# Patient Record
Sex: FEMALE | Race: WHITE | NOT HISPANIC OR LATINO | Employment: UNEMPLOYED | URBAN - METROPOLITAN AREA
[De-identification: names, ages, dates, MRNs, and addresses within clinical notes are randomized per-mention and may not be internally consistent; named-entity substitution may affect disease eponyms.]

---

## 2017-01-13 ENCOUNTER — TELEPHONE (OUTPATIENT)
Dept: SLEEP MEDICINE | Facility: CLINIC | Age: 52
End: 2017-01-13

## 2017-01-13 NOTE — TELEPHONE ENCOUNTER
FRANKY for mani to call back and reschedule her 2/8/17 appointment as Dr. Ferrer will not be in the office that afternoon

## 2017-01-17 DIAGNOSIS — Z94.0 KIDNEY TRANSPLANTED: Primary | ICD-10-CM

## 2017-01-17 RX ORDER — AZATHIOPRINE 50 MG/1
TABLET ORAL
Qty: 90 TABLET | Refills: 0 | Status: CANCELLED | OUTPATIENT
Start: 2017-01-17

## 2017-01-17 NOTE — TELEPHONE ENCOUNTER
Drug Name: Azathioprine 50mg tabs  Last Fill Date: 12/13/2016  Quantity: 90    Brigida Hernandez Cpht  [facility]  [phone]

## 2017-01-18 DIAGNOSIS — I10 HYPERTENSION: Primary | ICD-10-CM

## 2017-01-18 DIAGNOSIS — Z94.0 KIDNEY TRANSPLANTED: Primary | ICD-10-CM

## 2017-01-18 RX ORDER — LOSARTAN POTASSIUM 100 MG/1
50 TABLET ORAL DAILY
Qty: 90 TABLET | Refills: 0 | Status: SHIPPED | OUTPATIENT
Start: 2017-01-18 | End: 2017-08-23

## 2017-01-18 NOTE — TELEPHONE ENCOUNTER
Last Office Visit with Nephrologist:  9/29/15. Follow up scheduled 6/19/17.  Medication refilled per Nephrology Clinic protocol.    Catrina Ruelas RN

## 2017-01-19 RX ORDER — AZATHIOPRINE 50 MG/1
50 TABLET ORAL DAILY
Qty: 90 TABLET | Refills: 0 | Status: SHIPPED | OUTPATIENT
Start: 2017-01-19 | End: 2017-02-24

## 2017-02-22 ENCOUNTER — TELEPHONE (OUTPATIENT)
Dept: TRANSPLANT | Facility: CLINIC | Age: 52
End: 2017-02-22

## 2017-02-24 DIAGNOSIS — Z94.83 PANCREAS REPLACED BY TRANSPLANT (H): ICD-10-CM

## 2017-02-24 DIAGNOSIS — Z94.0 KIDNEY TRANSPLANTED: Primary | ICD-10-CM

## 2017-02-24 RX ORDER — AZATHIOPRINE 50 MG/1
150 TABLET ORAL DAILY
Qty: 270 TABLET | Refills: 0 | Status: SHIPPED | OUTPATIENT
Start: 2017-02-24 | End: 2017-05-31

## 2017-02-24 NOTE — TELEPHONE ENCOUNTER
Left message back for patient to call and let transplant office know what the mistake was so we can correct it.

## 2017-05-31 DIAGNOSIS — Z94.83 PANCREAS REPLACED BY TRANSPLANT (H): ICD-10-CM

## 2017-05-31 DIAGNOSIS — Z94.0 KIDNEY TRANSPLANTED: ICD-10-CM

## 2017-06-01 RX ORDER — AZATHIOPRINE 50 MG/1
150 TABLET ORAL DAILY
Qty: 90 TABLET | Refills: 0 | Status: SHIPPED | OUTPATIENT
Start: 2017-06-01 | End: 2017-07-06

## 2017-06-03 ENCOUNTER — HEALTH MAINTENANCE LETTER (OUTPATIENT)
Age: 52
End: 2017-06-03

## 2017-06-06 DIAGNOSIS — Z94.0 KIDNEY REPLACED BY TRANSPLANT: ICD-10-CM

## 2017-06-06 NOTE — TELEPHONE ENCOUNTER
Patient has not been seen since Sept 2015, but has a follow up appointment in 2 weeks. Temporary supply sent to patient's pharmacy until she's seen for follow up.    Hunter Sepulveda RN

## 2017-06-15 ENCOUNTER — TELEPHONE (OUTPATIENT)
Dept: NEPHROLOGY | Facility: CLINIC | Age: 52
End: 2017-06-15

## 2017-06-19 ENCOUNTER — OFFICE VISIT (OUTPATIENT)
Dept: NEPHROLOGY | Facility: CLINIC | Age: 52
End: 2017-06-19
Attending: INTERNAL MEDICINE
Payer: COMMERCIAL

## 2017-06-19 VITALS
TEMPERATURE: 98.8 F | WEIGHT: 286.4 LBS | HEIGHT: 68 IN | SYSTOLIC BLOOD PRESSURE: 117 MMHG | DIASTOLIC BLOOD PRESSURE: 85 MMHG | OXYGEN SATURATION: 96 % | HEART RATE: 108 BPM | BODY MASS INDEX: 43.41 KG/M2

## 2017-06-19 DIAGNOSIS — R53.83 FATIGUE, UNSPECIFIED TYPE: Primary | ICD-10-CM

## 2017-06-19 PROCEDURE — 99212 OFFICE O/P EST SF 10 MIN: CPT | Mod: ZF

## 2017-06-19 RX ORDER — VENLAFAXINE 75 MG/1
150 TABLET ORAL EVERY MORNING
Qty: 1 TABLET | Refills: 0 | Status: ON HOLD | COMMUNITY
Start: 2017-06-19 | End: 2022-05-14

## 2017-06-19 ASSESSMENT — PAIN SCALES - GENERAL: PAINLEVEL: NO PAIN (0)

## 2017-06-19 NOTE — MR AVS SNAPSHOT
After Visit Summary   6/19/2017    Delilah Fountain    MRN: [id number]           Patient Information     Date Of Birth          1965        Visit Information        Provider Department      6/19/2017 4:05 PM Will Crespo MD [facility] Nephrology        Today's Diagnoses     Fatigue, unspecified type    -  1       Follow-ups after your visit        Your next 10 appointments already scheduled     Dec 12, 2017  4:05 PM CST   (Arrive by 3:35 PM)   Return Kidney Transplant with Will Crespo MD   [facility] Nephrology ([facility])    [address] [place] [id number] [phone]              Future tests that were ordered for you today     Open Future Orders        Priority Expected Expires Ordered    CMV DNA quantification Routine  7/19/2017 6/19/2017    EBV DNA PCR Quantitative Whole Blood Routine  7/19/2017 6/19/2017    Lactate Dehydrogenase Routine  7/19/2017 6/19/2017            Who to contact     If you have questions or need follow up information about today's clinic visit or your schedule please contact [facility] NEPHROLOGY directly at [phone].  Normal or non-critical lab and imaging results will be communicated to you by [employer], letter or phone within 4 business days after the clinic has received the results. If you do not hear from us within 7 days, please contact the clinic through [employer] or phone. If you have a critical or abnormal lab result, we will notify you by phone as soon as possible.  Submit refill requests through [employer] or call your pharmacy and they will forward the refill request to us. Please allow 3 business days for your refill to be completed.          Additional Information About Your Visit        MyChart Information     [employer] gives you secure access to your electronic health record. If you see a primary care provider, you can also send messages to your care team and make appointments. If you have  "questions, please call your primary care clinic.  If you do not have a primary care provider, please call [phone] and they will assist you.        Care EveryWhere ID     This is your Care EveryWhere ID. This could be used by other organizations to access your [place] medical records  [id number]        Your Vitals Were     Pulse Temperature Height Pulse Oximetry BMI (Body Mass Index)       108 98.8  F (37.1  C) (Oral) 1.727 m (5' 8\") 96% 43.55 kg/m2        Blood Pressure from Last 3 Encounters:   06/19/17 117/85   12/01/16 129/80   09/30/16 130/78    Weight from Last 3 Encounters:   06/19/17 129.9 kg (286 lb 6.4 oz)   12/01/16 134 kg (295 lb 8 oz)   09/30/16 134 kg (295 lb 6.4 oz)                 Today's Medication Changes          These changes are accurate as of: 6/19/17  5:38 PM.  If you have any questions, ask your nurse or doctor.               Stop taking these medicines if you haven't already. Please contact your care team if you have questions.     citalopram 10 MG tablet   Commonly known as:  celeXA   Stopped by:  Will Crespo MD                    Primary Care Provider Office Phone # Fax #    Alban Clark [phone]       [facility] [address]  [place] [address]        Thank you!     Thank you for choosing [facility] NEPHROLOGY  for your care. Our goal is always to provide you with excellent care. Hearing back from our patients is one way we can continue to improve our services. Please take a few minutes to complete the written survey that you may receive in the mail after your visit with us. Thank you!             Your Updated Medication List - Protect others around you: Learn how to safely use, store and throw away your medicines at www.disposemymeds.org.          This list is accurate as of: 6/19/17  5:38 PM.  Always use your most recent med list.                   Brand Name Dispense Instructions for use    ASPIRIN PO      Take 81 mg by mouth " daily.       azaTHIOprine 50 MG tablet    IMURAN    90 tablet    Take 3 tablets (150 mg) by mouth daily       calcium carbonate 1250 MG tablet    OS-RENATO 500 mg [place]. [place]     Take 500 mg by mouth 2 times daily       cholecalciferol 1000 UNITS capsule    vitamin  -D    30 capsule    Take 1 capsule by mouth daily.       losartan 100 MG tablet    COZAAR    90 tablet    Take 0.5 tablets (50 mg) by mouth daily       order for DME      AIRSENSE 10 10-15CM/H20 PILLOWS CHRISTIANSON FX FOR HER       sodium bicarbonate 650 MG tablet     360 tablet    Take 2 tablets (1,300 mg) by mouth 2 times daily       sulfamethoxazole-trimethoprim 400-80 MG per tablet    BACTRIM    40 tablet    Take 1 tablet by mouth Every Mon, Wed, Fri Morning       tacrolimus capsule     360 capsule    TAKE TWO CAPSULES BY MOUTH TWICE A DAY (DAW1)       venlafaxine 75 MG tablet    EFFEXOR    1 tablet    Unsure of dose       ZYRTEC ALLERGY PO      Take 5 mg by mouth 2 times daily

## 2017-06-19 NOTE — LETTER
6/19/2017      RE: Delilah Fountain  [address]  [address]  [facility] [address]       Assessment and Plan:  1. SPK - with early complications but had a very stable course thereafter with stable allograft functions   2. Immunosuppression management currently on dual regimen consisting of Imuran and prograf with good tolerance   3. Anxiety/Depression with controlled symptoms follows with psychiatrist and psychologist with no specific current concerns   4. Obesity we discussed diet/exercise   5. Chronic fatigue unclear if related to #3 vs others. Owing to her immunosuppression, will check EBV, CMV and LDH. Off note her lymphatic exam was normal   6. Renal Osteodystrophy will need to update her vitamin D and PTH at some point. Her renal allograft function is stable and brisk   7. Health maintenance we discussed the need to follow up with dermatology in addition to the usual health maintenance exams.    Assessment and plan was discussed with patient and she voiced her understanding and agreement.    Reason for Visit:  Ms. Fountain is here for routine follow up.    HPI:   Delilah Fountain is a 51 year old female with ESKD from Type 1 Diabetes and is status post SPK in 2000.         Transplant Hx:       Tx: SPK  Date: 7/12/2000       Present Maintenance IS: Tacrolimus and Azathioprine       Baseline Creatinine: 0.7 mg/dL        Recent DSA: No 6/2016       Biopsy: No    Here for routine follow up. Doing well for the most part. Dealing with anxiety/depression which impacted her ability to work. She reports good control and no thoughts of hurting self or others. She is taking her medications regularly and has good social support. She noted fatigue but no major concerning symptoms such as unexplained weight loss, fevers or night sweats.    Home BP: Not checked.      ROS:   A comprehensive review of systems was obtained and negative, except as noted in the HPI or PMH.    Active Medical Problems:  Patient Active  Problem List   Diagnosis     Hernia of abdominal cavity, with obstruction     Hernia of abdominal wall     Knee pain, bilateral     Hip pain     HTN (hypertension)       Personal Hx:  Social History     Social History     Marital status: Single     Spouse name: N/A     Number of children: N/A     Years of education: N/A     Occupational History     Not on file.     Social History Main Topics     Smoking status: Never Smoker     Smokeless tobacco: Not on file     Alcohol use Yes      Comment: rarely     Drug use: No     Sexual activity: Not on file     Other Topics Concern     Not on file     Social History Narrative       Allergies:  No Known Allergies    Medications:  Prior to Admission medications    Medication Sig Start Date End Date Taking? Authorizing Provider   venlafaxine (EFFEXOR) 75 MG tablet Unsure of dose 6/19/17  Yes Will Crespo MD   azaTHIOprine (IMURAN) 50 MG tablet Take 3 tablets (150 mg) by mouth daily 6/1/17  Yes Will Crespo MD   losartan (COZAAR) 100 MG tablet Take 0.5 tablets (50 mg) by mouth daily 1/18/17  Yes Rafa Mijares MD   sulfamethoxazole-trimethoprim (BACTRIM) 400-80 MG per tablet Take 1 tablet by mouth Every Mon, Wed, Fri Morning 12/30/16  Yes Rafa Mijares MD   order for DME AIRSENSE 10  10-15CM/H20  PILLOWS CHRISTIANSON FX FOR HER   Yes Reported, Patient   PROGRAF 1 MG PO CAPSULE TAKE TWO CAPSULES BY MOUTH TWICE A DAY (DAW1) 12/8/16  Yes Rafa Mijares MD   Cetirizine HCl (ZYRTEC ALLERGY PO) Take 5 mg by mouth 2 times daily   Yes Reported, Patient   calcium carbonate (OS-RENATO 500 MG [place]. [place]) 500 MG tablet Take 500 mg by mouth 2 times daily   Yes Reported, Patient   cholecalciferol (VITAMIN  -D) 1000 UNITS capsule Take 1 capsule by mouth daily. 6/27/17   Will Crespo MD   sodium bicarbonate 650 MG tablet Take 2 tablets (1,300 mg) by mouth 2 times daily 6/26/17   Will Crespo MD   ASPIRIN PO Take 81 mg by mouth daily.    Reported, Patient       Vitals:  /85   "Pulse 108  Temp 98.8  F (37.1  C) (Oral)  Ht 1.727 m (5' 8\")  Wt 129.9 kg (286 lb 6.4 oz)  SpO2 96%  BMI 43.55 kg/m2    Exam:   GENERAL APPEARANCE: alert and no distress  HENT: mouth without ulcers or lesions  LYMPHATICS: no cervical or supraclavicular nodes  RESP: lungs clear to auscultation - no rales, rhonchi or wheezes  CV: regular rhythm, normal rate, no rub, no murmur  EDEMA: no LE edema bilaterally  ABDOMEN: soft, nondistended, nontender, bowel sounds normal  MS: extremities normal - no gross deformities noted, no evidence of inflammation in joints, no muscle tenderness  SKIN: no rash    Results:   Reviewed       Will Crespo MD      "

## 2017-06-19 NOTE — NURSING NOTE
"Chief Complaint   Patient presents with     RECHECK     Follow up Kidney and pancreas transplant.       Initial /85  Pulse 108  Temp 98.8  F (37.1  C) (Oral)  Ht 1.727 m (5' 8\")  Wt 129.9 kg (286 lb 6.4 oz)  SpO2 96%  BMI 43.55 kg/m2 Estimated body mass index is 43.55 kg/(m^2) as calculated from the following:    Height as of this encounter: 1.727 m (5' 8\").    Weight as of this encounter: 129.9 kg (286 lb 6.4 oz).  Medication Reconciliation: complete   Renee Frye., CMA    "

## 2017-06-26 DIAGNOSIS — Z94.0 KIDNEY REPLACED BY TRANSPLANT: ICD-10-CM

## 2017-06-26 RX ORDER — SODIUM BICARBONATE 650 MG/1
1300 TABLET ORAL 2 TIMES DAILY
Qty: 360 TABLET | Refills: 3 | Status: ON HOLD | OUTPATIENT
Start: 2017-06-26 | End: 2022-05-16

## 2017-06-26 NOTE — TELEPHONE ENCOUNTER
Last Office Visit with Nephrologist:  6/19/17.  Medication refilled per Nephrology Clinic protocol.     Catrina Ruelas RN

## 2017-06-27 ENCOUNTER — TELEPHONE (OUTPATIENT)
Dept: NEPHROLOGY | Facility: CLINIC | Age: 52
End: 2017-06-27

## 2017-06-27 DIAGNOSIS — Z94.0 KIDNEY REPLACED BY TRANSPLANT: ICD-10-CM

## 2017-06-27 NOTE — TELEPHONE ENCOUNTER
Medication refill per Clinic 2A protocol.  cholecalciferol  Associated DX: vitamin D def  Last OV: 6/2017  Next appointment: 12/2017  Lucita Chung LPN  Nephrology  [facility]  [phone]

## 2017-07-04 NOTE — PROGRESS NOTES
Assessment and Plan:  1. SPK - with early complications but had a very stable course thereafter with stable allograft functions   2. Immunosuppression management currently on dual regimen consisting of Imuran and prograf with good tolerance   3. Anxiety/Depression with controlled symptoms follows with psychiatrist and psychologist with no specific current concerns   4. Obesity we discussed diet/exercise   5. Chronic fatigue unclear if related to #3 vs others. Owing to her immunosuppression, will check EBV, CMV and LDH. Off note her lymphatic exam was normal   6. Renal Osteodystrophy will need to update her vitamin D and PTH at some point. Her renal allograft function is stable and brisk   7. Health maintenance we discussed the need to follow up with dermatology in addition to the usual health maintenance exams.    Assessment and plan was discussed with patient and she voiced her understanding and agreement.    Reason for Visit:  Ms. Fountain is here for routine follow up.    HPI:   Delilah Fountain is a 51 year old female with ESKD from Type 1 Diabetes and is status post SPK in 2000.         Transplant Hx:       Tx: SPK  Date: 7/12/2000       Present Maintenance IS: Tacrolimus and Azathioprine       Baseline Creatinine: 0.7 mg/dL        Recent DSA: No 6/2016       Biopsy: No    Here for routine follow up. Doing well for the most part. Dealing with anxiety/depression which impacted her ability to work. She reports good control and no thoughts of hurting self or others. She is taking her medications regularly and has good social support. She noted fatigue but no major concerning symptoms such as unexplained weight loss, fevers or night sweats.    Home BP: Not checked.      ROS:   A comprehensive review of systems was obtained and negative, except as noted in the HPI or PMH.    Active Medical Problems:  Patient Active Problem List   Diagnosis     Hernia of abdominal cavity, with obstruction     Hernia of abdominal  "wall     Knee pain, bilateral     Hip pain     HTN (hypertension)       Personal Hx:  Social History     Social History     Marital status: Single     Spouse name: N/A     Number of children: N/A     Years of education: N/A     Occupational History     Not on file.     Social History Main Topics     Smoking status: Never Smoker     Smokeless tobacco: Not on file     Alcohol use Yes      Comment: rarely     Drug use: No     Sexual activity: Not on file     Other Topics Concern     Not on file     Social History Narrative       Allergies:  No Known Allergies    Medications:  Prior to Admission medications    Medication Sig Start Date End Date Taking? Authorizing Provider   venlafaxine (EFFEXOR) 75 MG tablet Unsure of dose 6/19/17  Yes Will Crespo MD   azaTHIOprine (IMURAN) 50 MG tablet Take 3 tablets (150 mg) by mouth daily 6/1/17  Yes Will Crespo MD   losartan (COZAAR) 100 MG tablet Take 0.5 tablets (50 mg) by mouth daily 1/18/17  Yes Rafa Mijares MD   sulfamethoxazole-trimethoprim (BACTRIM) 400-80 MG per tablet Take 1 tablet by mouth Every Mon, Wed, Fri Morning 12/30/16  Yes Rafa Mijares MD   order for DME AIRSENSE 10  10-15CM/H20  PILLOWS CHRISTIANSON FX FOR HER   Yes Reported, Patient   PROGRAF 1 MG PO CAPSULE TAKE TWO CAPSULES BY MOUTH TWICE A DAY (DAW1) 12/8/16  Yes Rafa Mijares MD   Cetirizine HCl (ZYRTEC ALLERGY PO) Take 5 mg by mouth 2 times daily   Yes Reported, Patient   calcium carbonate (OS-RENATO 500 MG [place]. [place]) 500 MG tablet Take 500 mg by mouth 2 times daily   Yes Reported, Patient   cholecalciferol (VITAMIN  -D) 1000 UNITS capsule Take 1 capsule by mouth daily. 6/27/17   Will Crespo MD   sodium bicarbonate 650 MG tablet Take 2 tablets (1,300 mg) by mouth 2 times daily 6/26/17   Will Crespo MD   ASPIRIN PO Take 81 mg by mouth daily.    Reported, Patient       Vitals:  /85  Pulse 108  Temp 98.8  F (37.1  C) (Oral)  Ht 1.727 m (5' 8\")  Wt 129.9 kg (286 lb 6.4 oz)  " SpO2 96%  BMI 43.55 kg/m2    Exam:   GENERAL APPEARANCE: alert and no distress  HENT: mouth without ulcers or lesions  LYMPHATICS: no cervical or supraclavicular nodes  RESP: lungs clear to auscultation - no rales, rhonchi or wheezes  CV: regular rhythm, normal rate, no rub, no murmur  EDEMA: no LE edema bilaterally  ABDOMEN: soft, nondistended, nontender, bowel sounds normal  MS: extremities normal - no gross deformities noted, no evidence of inflammation in joints, no muscle tenderness  SKIN: no rash    Results:   Reviewed

## 2017-07-05 ENCOUNTER — TELEPHONE (OUTPATIENT)
Dept: NEPHROLOGY | Facility: CLINIC | Age: 52
End: 2017-07-05

## 2017-07-05 DIAGNOSIS — Z94.83 PANCREAS REPLACED BY TRANSPLANT (H): Primary | ICD-10-CM

## 2017-07-05 DIAGNOSIS — E55.9 VITAMIN D DEFICIENCY: ICD-10-CM

## 2017-07-05 DIAGNOSIS — Z94.0 KIDNEY REPLACED BY TRANSPLANT: ICD-10-CM

## 2017-07-05 NOTE — TELEPHONE ENCOUNTER
Left voicemail for patient requesting call back. Will advise of add on labs and educate on the importance of scheduling labs every three months, more frequently if new concerns arise.

## 2017-07-06 DIAGNOSIS — Z94.0 KIDNEY TRANSPLANTED: ICD-10-CM

## 2017-07-06 DIAGNOSIS — Z94.83 PANCREAS REPLACED BY TRANSPLANT (H): ICD-10-CM

## 2017-07-06 RX ORDER — AZATHIOPRINE 50 MG/1
150 TABLET ORAL DAILY
Qty: 90 TABLET | Refills: 0 | Status: SHIPPED | OUTPATIENT
Start: 2017-07-06 | End: 2017-08-23

## 2017-08-23 DIAGNOSIS — I10 HYPERTENSION: ICD-10-CM

## 2017-08-23 DIAGNOSIS — Z94.83 PANCREAS REPLACED BY TRANSPLANT (H): ICD-10-CM

## 2017-08-23 DIAGNOSIS — Z94.0 KIDNEY TRANSPLANTED: ICD-10-CM

## 2017-08-23 RX ORDER — AZATHIOPRINE 50 MG/1
150 TABLET ORAL DAILY
Qty: 90 TABLET | Refills: 11 | Status: SHIPPED | OUTPATIENT
Start: 2017-08-23 | End: 2018-08-24

## 2017-08-23 RX ORDER — LOSARTAN POTASSIUM 100 MG/1
TABLET ORAL
Qty: 45 TABLET | Refills: 1 | Status: SHIPPED | OUTPATIENT
Start: 2017-08-23 | End: 2018-03-27

## 2017-08-25 ENCOUNTER — RESULTS ONLY (OUTPATIENT)
Dept: OTHER | Facility: CLINIC | Age: 52
End: 2017-08-25

## 2017-08-25 DIAGNOSIS — E55.9 VITAMIN D DEFICIENCY: ICD-10-CM

## 2017-08-25 DIAGNOSIS — R53.83 FATIGUE, UNSPECIFIED TYPE: ICD-10-CM

## 2017-08-25 DIAGNOSIS — Z94.0 KIDNEY REPLACED BY TRANSPLANT: ICD-10-CM

## 2017-08-25 DIAGNOSIS — Z94.83 PANCREAS REPLACED BY TRANSPLANT (H): ICD-10-CM

## 2017-08-25 LAB
ERYTHROCYTE [DISTWIDTH] IN BLOOD BY AUTOMATED COUNT: 14 % (ref 10–15)
HBA1C MFR BLD: 5.4 % (ref 4.3–6)
HCT VFR BLD AUTO: 42.1 % (ref 35–47)
HGB BLD-MCNC: 14 G/DL (ref 11.7–15.7)
LDH SERPL L TO P-CCNC: 114 U/L (ref 81–234)
LIPASE SERPL-CCNC: 98 U/L (ref 73–393)
MCH RBC QN AUTO: 32 PG (ref 26.5–33)
MCHC RBC AUTO-ENTMCNC: 33.3 G/DL (ref 31.5–36.5)
MCV RBC AUTO: 96 FL (ref 78–100)
PLATELET # BLD AUTO: 266 10E9/L (ref 150–450)
PTH-INTACT SERPL-MCNC: 62 PG/ML (ref 12–72)
RBC # BLD AUTO: 4.37 10E12/L (ref 3.8–5.2)
WBC # BLD AUTO: 8.4 10E9/L (ref 4–11)

## 2017-08-25 PROCEDURE — 82150 ASSAY OF AMYLASE: CPT | Performed by: INTERNAL MEDICINE

## 2017-08-25 PROCEDURE — 83970 ASSAY OF PARATHORMONE: CPT | Performed by: INTERNAL MEDICINE

## 2017-08-25 PROCEDURE — 86833 HLA CLASS II HIGH DEFIN QUAL: CPT | Performed by: SURGERY

## 2017-08-25 PROCEDURE — 86832 HLA CLASS I HIGH DEFIN QUAL: CPT | Performed by: SURGERY

## 2017-08-25 PROCEDURE — 80048 BASIC METABOLIC PNL TOTAL CA: CPT | Performed by: INTERNAL MEDICINE

## 2017-08-25 PROCEDURE — 87799 DETECT AGENT NOS DNA QUANT: CPT | Mod: 59 | Performed by: INTERNAL MEDICINE

## 2017-08-25 PROCEDURE — 83036 HEMOGLOBIN GLYCOSYLATED A1C: CPT | Performed by: INTERNAL MEDICINE

## 2017-08-25 PROCEDURE — 36415 COLL VENOUS BLD VENIPUNCTURE: CPT | Performed by: INTERNAL MEDICINE

## 2017-08-25 PROCEDURE — 83690 ASSAY OF LIPASE: CPT | Performed by: INTERNAL MEDICINE

## 2017-08-25 PROCEDURE — 80061 LIPID PANEL: CPT | Performed by: INTERNAL MEDICINE

## 2017-08-25 PROCEDURE — 80197 ASSAY OF TACROLIMUS: CPT | Performed by: INTERNAL MEDICINE

## 2017-08-25 PROCEDURE — 83615 LACTATE (LD) (LDH) ENZYME: CPT | Performed by: INTERNAL MEDICINE

## 2017-08-25 PROCEDURE — 82306 VITAMIN D 25 HYDROXY: CPT | Performed by: INTERNAL MEDICINE

## 2017-08-25 PROCEDURE — 87799 DETECT AGENT NOS DNA QUANT: CPT | Performed by: INTERNAL MEDICINE

## 2017-08-25 PROCEDURE — 85027 COMPLETE CBC AUTOMATED: CPT | Performed by: INTERNAL MEDICINE

## 2017-08-26 LAB
AMYLASE SERPL-CCNC: 40 U/L (ref 30–110)
ANION GAP SERPL CALCULATED.3IONS-SCNC: 6 MMOL/L (ref 3–14)
BUN SERPL-MCNC: 11 MG/DL (ref 7–30)
CALCIUM SERPL-MCNC: 8.7 MG/DL (ref 8.5–10.1)
CHLORIDE SERPL-SCNC: 109 MMOL/L (ref 94–109)
CHOLEST SERPL-MCNC: 159 MG/DL
CMV DNA SPEC NAA+PROBE-ACNC: NORMAL [IU]/ML
CMV DNA SPEC NAA+PROBE-LOG#: NORMAL {LOG_IU}/ML
CO2 SERPL-SCNC: 26 MMOL/L (ref 20–32)
CREAT SERPL-MCNC: 0.67 MG/DL (ref 0.52–1.04)
GFR SERPL CREATININE-BSD FRML MDRD: >90 ML/MIN/1.7M2
GLUCOSE SERPL-MCNC: 106 MG/DL (ref 70–99)
HDLC SERPL-MCNC: 39 MG/DL
LDLC SERPL CALC-MCNC: 105 MG/DL
NONHDLC SERPL-MCNC: 120 MG/DL
POTASSIUM SERPL-SCNC: 4.1 MMOL/L (ref 3.4–5.3)
SODIUM SERPL-SCNC: 141 MMOL/L (ref 133–144)
SPECIMEN SOURCE: NORMAL
TACROLIMUS BLD-MCNC: 5.3 UG/L (ref 5–15)
TME LAST DOSE: 2230 H
TRIGL SERPL-MCNC: 74 MG/DL

## 2017-08-27 LAB — DEPRECATED CALCIDIOL+CALCIFEROL SERPL-MC: 23 UG/L (ref 20–75)

## 2017-08-28 LAB
BKV DNA # SPEC NAA+PROBE: NORMAL COPIES/ML
BKV DNA SPEC NAA+PROBE-LOG#: NORMAL LOG COPIES/ML
EBV DNA # SPEC NAA+PROBE: NORMAL {COPIES}/ML
EBV DNA SPEC NAA+PROBE-LOG#: NORMAL {LOG_COPIES}/ML
PRA DONOR SPECIFIC ABY: NORMAL
SPECIMEN SOURCE: NORMAL

## 2017-09-18 LAB
DONOR IDENTIFICATION: NORMAL
DSA COMMENTS: NORMAL
DSA PRESENT: NO
DSA TEST METHOD: NORMAL
ORGAN: NORMAL
SA1 CELL: NORMAL
SA1 COMMENTS: NORMAL
SA1 HI RISK ABY: NORMAL
SA1 MOD RISK ABY: NORMAL
SA1 TEST METHOD: NORMAL
SA2 CELL: NORMAL
SA2 COMMENTS: NORMAL
SA2 HI RISK ABY UA: NORMAL
SA2 MOD RISK ABY: NORMAL
SA2 TEST METHOD: NORMAL

## 2017-12-15 DIAGNOSIS — Z94.0 KIDNEY TRANSPLANTED: ICD-10-CM

## 2017-12-18 RX ORDER — TACROLIMUS 1 MG/1
2 CAPSULE, GELATIN COATED ORAL 2 TIMES DAILY
Qty: 120 CAPSULE | Refills: 3 | Status: SHIPPED | OUTPATIENT
Start: 2017-12-18 | End: 2018-05-25

## 2018-01-02 DIAGNOSIS — Z94.83 PANCREAS REPLACED BY TRANSPLANT (H): Primary | ICD-10-CM

## 2018-01-03 RX ORDER — SULFAMETHOXAZOLE AND TRIMETHOPRIM 400; 80 MG/1; MG/1
1 TABLET ORAL
Qty: 40 TABLET | Refills: 0 | Status: SHIPPED | OUTPATIENT
Start: 2018-01-03

## 2018-03-27 DIAGNOSIS — I10 HYPERTENSION: ICD-10-CM

## 2018-03-27 RX ORDER — LOSARTAN POTASSIUM 100 MG/1
TABLET ORAL
Qty: 45 TABLET | Refills: 3 | Status: SHIPPED | OUTPATIENT
Start: 2018-03-27 | End: 2019-04-30

## 2018-05-25 ENCOUNTER — TELEPHONE (OUTPATIENT)
Dept: TRANSPLANT | Facility: CLINIC | Age: 53
End: 2018-05-25

## 2018-05-25 DIAGNOSIS — Z94.0 KIDNEY TRANSPLANTED: Primary | ICD-10-CM

## 2018-05-25 RX ORDER — TACROLIMUS 1 MG/1
2 CAPSULE, GELATIN COATED ORAL 2 TIMES DAILY
Qty: 120 CAPSULE | Refills: 0 | Status: SHIPPED | OUTPATIENT
Start: 2018-05-25 | End: 2018-06-21

## 2018-05-25 NOTE — TELEPHONE ENCOUNTER
Last seen 6/9/17.  No showed for 12/12/17 clinic.    LPN task:  Call and invite back for Annual face-to-face appointment with Transplant Nephrology.  Send a message to Carolin Mesa to set-up visit.  Please send a letter if unable to discuss on the phone.    Prograf Refill x 3 months sent.

## 2018-05-29 NOTE — TELEPHONE ENCOUNTER
Spoke to patient who states that she will return to the lab at [facility] within 1-2 weeks.  Lab orders current in epic.  Patient verbalizes OK to have  call her for appt.  Sent message to schedulers to call for f/u Nephrology appt.

## 2018-06-21 ENCOUNTER — TELEPHONE (OUTPATIENT)
Dept: TRANSPLANT | Facility: CLINIC | Age: 53
End: 2018-06-21

## 2018-06-21 DIAGNOSIS — Z94.0 KIDNEY TRANSPLANTED: ICD-10-CM

## 2018-06-21 DIAGNOSIS — Z94.0 KIDNEY REPLACED BY TRANSPLANT: ICD-10-CM

## 2018-06-21 NOTE — TELEPHONE ENCOUNTER
Prograf 1mg  Last FIlled Date 5/30/18  Qty dispensed 120  Thank you very kindly!  Gali Wise [facility]

## 2018-06-22 RX ORDER — TACROLIMUS 1 MG/1
2 CAPSULE, GELATIN COATED ORAL 2 TIMES DAILY
Qty: 120 CAPSULE | Refills: 0 | Status: SHIPPED | OUTPATIENT
Start: 2018-06-22 | End: 2018-07-23

## 2018-06-25 DIAGNOSIS — Z94.0 KIDNEY REPLACED BY TRANSPLANT: ICD-10-CM

## 2018-06-25 DIAGNOSIS — Z94.83 PANCREAS REPLACED BY TRANSPLANT (H): ICD-10-CM

## 2018-06-25 LAB
AMYLASE SERPL-CCNC: 33 U/L (ref 30–110)
ANION GAP SERPL CALCULATED.3IONS-SCNC: 12 MMOL/L (ref 3–14)
BUN SERPL-MCNC: 13 MG/DL (ref 7–30)
CALCIUM SERPL-MCNC: 9 MG/DL (ref 8.5–10.1)
CHLORIDE SERPL-SCNC: 109 MMOL/L (ref 94–109)
CHOLEST SERPL-MCNC: 176 MG/DL
CO2 SERPL-SCNC: 22 MMOL/L (ref 20–32)
CREAT SERPL-MCNC: 0.68 MG/DL (ref 0.52–1.04)
ERYTHROCYTE [DISTWIDTH] IN BLOOD BY AUTOMATED COUNT: 14.1 % (ref 10–15)
GFR SERPL CREATININE-BSD FRML MDRD: >90 ML/MIN/1.7M2
GLUCOSE SERPL-MCNC: 119 MG/DL (ref 70–99)
HBA1C MFR BLD: 5.6 % (ref 0–5.6)
HCT VFR BLD AUTO: 43.5 % (ref 35–47)
HDLC SERPL-MCNC: 40 MG/DL
HGB BLD-MCNC: 14.5 G/DL (ref 11.7–15.7)
LDLC SERPL CALC-MCNC: 114 MG/DL
LIPASE SERPL-CCNC: 80 U/L (ref 73–393)
MCH RBC QN AUTO: 32 PG (ref 26.5–33)
MCHC RBC AUTO-ENTMCNC: 33.3 G/DL (ref 31.5–36.5)
MCV RBC AUTO: 96 FL (ref 78–100)
NONHDLC SERPL-MCNC: 136 MG/DL
PLATELET # BLD AUTO: 285 10E9/L (ref 150–450)
POTASSIUM SERPL-SCNC: 3.8 MMOL/L (ref 3.4–5.3)
RBC # BLD AUTO: 4.53 10E12/L (ref 3.8–5.2)
SODIUM SERPL-SCNC: 143 MMOL/L (ref 133–144)
TACROLIMUS BLD-MCNC: 6.6 UG/L (ref 5–15)
TME LAST DOSE: 2300 H
TRIGL SERPL-MCNC: 112 MG/DL
WBC # BLD AUTO: 8.4 10E9/L (ref 4–11)

## 2018-06-25 PROCEDURE — 85027 COMPLETE CBC AUTOMATED: CPT | Performed by: INTERNAL MEDICINE

## 2018-06-25 PROCEDURE — 83036 HEMOGLOBIN GLYCOSYLATED A1C: CPT | Performed by: INTERNAL MEDICINE

## 2018-06-25 PROCEDURE — 36415 COLL VENOUS BLD VENIPUNCTURE: CPT | Performed by: INTERNAL MEDICINE

## 2018-06-25 PROCEDURE — 80061 LIPID PANEL: CPT | Performed by: INTERNAL MEDICINE

## 2018-06-25 PROCEDURE — 80048 BASIC METABOLIC PNL TOTAL CA: CPT | Performed by: INTERNAL MEDICINE

## 2018-06-25 PROCEDURE — 83690 ASSAY OF LIPASE: CPT | Performed by: INTERNAL MEDICINE

## 2018-06-25 PROCEDURE — 87799 DETECT AGENT NOS DNA QUANT: CPT | Performed by: INTERNAL MEDICINE

## 2018-06-25 PROCEDURE — 82150 ASSAY OF AMYLASE: CPT | Performed by: INTERNAL MEDICINE

## 2018-06-25 PROCEDURE — 80197 ASSAY OF TACROLIMUS: CPT | Performed by: INTERNAL MEDICINE

## 2018-06-27 LAB
BKV DNA # SPEC NAA+PROBE: NORMAL COPIES/ML
BKV DNA SPEC NAA+PROBE-LOG#: NORMAL LOG COPIES/ML
SPECIMEN SOURCE: NORMAL

## 2018-07-05 ENCOUNTER — TELEPHONE (OUTPATIENT)
Dept: NEPHROLOGY | Facility: CLINIC | Age: 53
End: 2018-07-05

## 2018-07-05 ENCOUNTER — OFFICE VISIT (OUTPATIENT)
Dept: NEPHROLOGY | Facility: CLINIC | Age: 53
End: 2018-07-05
Attending: INTERNAL MEDICINE
Payer: COMMERCIAL

## 2018-07-05 VITALS
BODY MASS INDEX: 44.41 KG/M2 | HEART RATE: 98 BPM | OXYGEN SATURATION: 98 % | SYSTOLIC BLOOD PRESSURE: 160 MMHG | WEIGHT: 293 LBS | HEIGHT: 68 IN | DIASTOLIC BLOOD PRESSURE: 79 MMHG

## 2018-07-05 DIAGNOSIS — Z94.0 STATUS POST KIDNEY TRANSPLANT: Primary | ICD-10-CM

## 2018-07-05 DIAGNOSIS — Z29.89 NEED FOR PNEUMOCYSTIS PROPHYLAXIS: ICD-10-CM

## 2018-07-05 DIAGNOSIS — E87.20 ACIDOSIS: ICD-10-CM

## 2018-07-05 DIAGNOSIS — D84.9 IMMUNOSUPPRESSION (H): ICD-10-CM

## 2018-07-05 DIAGNOSIS — I10 BENIGN ESSENTIAL HYPERTENSION: ICD-10-CM

## 2018-07-05 DIAGNOSIS — Z94.83 STATUS POST PANCREAS TRANSPLANTATION (H): ICD-10-CM

## 2018-07-05 DIAGNOSIS — E78.5 HYPERLIPIDEMIA, UNSPECIFIED HYPERLIPIDEMIA TYPE: ICD-10-CM

## 2018-07-05 PROCEDURE — G0463 HOSPITAL OUTPT CLINIC VISIT: HCPCS | Mod: ZF

## 2018-07-05 RX ORDER — ATORVASTATIN CALCIUM 10 MG/1
10 TABLET, FILM COATED ORAL DAILY
Qty: 90 TABLET | Refills: 3 | Status: SHIPPED | OUTPATIENT
Start: 2018-07-05 | End: 2019-10-08

## 2018-07-05 ASSESSMENT — PAIN SCALES - GENERAL: PAINLEVEL: NO PAIN (0)

## 2018-07-05 NOTE — PROGRESS NOTES
Assessment and Plan:  1. 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 Currently on immunosuppression with:    Tacrolimus 2 mg po bid - 6.6 ng / ml. Goal 5-8 ng / ml  Azathioprine 150 mg daily    UPC: 0.06 g / g  DSA:  Negative last in September.  BK: BK last negative on 6/25/18.     Amylase/lipase: 33/80, respectively and stable.     Glucose: fasting sugars.     A1c: last a1c was 5.6 g %    Creatinine baseline is 0.7 mg / dl.    2. Immunosuppression: see above.     3. Met acidosis: sodium bicarbonate supplementation. Goal c02 > 22 meq / l    4. HLD: start atorvastatin 10 mg po qhs.    5. Need for pneumocystis prophylaxis: bactrim ss tab daily.     6. HTN: BP elevated today, but at goal at home. White coat HTN> continue losartan.     Assessment and plan was discussed with patient and she voiced her understanding and agreement.    Reason for Visit:  Ms. Fountain is here for routine follow up of SPK.    HPI:   Delilah Fountain is a 52 year old female with ESKD from Type 1 Diabetes and is status post SPK on 7/12/2000.         Transplant Hx:       Tx: SPK  Date: 7/12/2000       Present Maintenance IS: Tacrolimus and Azathioprine       Baseline Creatinine: 0.7 mg / dl       Recent DSA: No         Biopsy: No    The patient is a 52-year-old female with history of type 1 diabetes status post simultaneous pancreas and kidney transplant on 7/12/2000.  Patient is here for follow-up of her transplants.    The patient is doing well.  She specifically denies any chest pain or shortness breath.  She has no nausea or vomiting.  She has no fever shakes chills.  She is moving her bowels appropriately.    She is having problems in terms of weight gain.  We discussed caloric restriction as well as increasing physical activity to be able to burn off calories.  Next    She is on chronic amino suppression with tacrolimus and azathioprine.    Her allograft functions are excellent.  She last had an A1c of 5.6 g percent.  Her  creatinine at baseline is 0.7 mg/dL and stable.    Blood pressure today is at goal.  Her only change that are we are making is adding atorvastatin 10 mg p.o. nightly for her hyperlipidemia with goal LDL less than 100 ideally less than 70 mg/dL.    Home BP: at goal.      ROS:   A comprehensive review of systems was obtained and negative, except as noted in the HPI or PMH.    Active Medical Problems:  Patient Active Problem List   Diagnosis     Hernia of abdominal cavity, with obstruction     Hernia of abdominal wall     Knee pain, bilateral     Hip pain     HTN (hypertension)     Personal Hx:  Social History     Social History     Marital status: Single     Spouse name: N/A     Number of children: N/A     Years of education: N/A     Occupational History     Not on file.     Social History Main Topics     Smoking status: Never Smoker     Smokeless tobacco: Never Used     Alcohol use Yes      Comment: rarely     Drug use: No     Sexual activity: Not on file     Other Topics Concern     Not on file     Social History Narrative     Allergies:  No Known Allergies    Medications:  Prior to Admission medications    Medication Sig Start Date End Date Taking? Authorizing Provider   azaTHIOprine (IMURAN) 50 MG tablet Take 3 tablets (150 mg) by mouth daily 8/23/17  Yes Will Crespo MD   calcium carbonate (OS-RENATO 500 MG [place]. [place]) 500 MG tablet Take 500 mg by mouth 2 times daily   Yes Reported, Patient   Cetirizine HCl (ZYRTEC ALLERGY PO) Take 5 mg by mouth 2 times daily   Yes Reported, Patient   cholecalciferol (VITAMIN  -D) 1000 UNITS capsule Take 1 capsule by mouth daily. 6/27/17  Yes Will Crespo MD   losartan (COZAAR) 100 MG tablet TAKE 1/2 TABLET EVERY DAY 3/27/18  Yes Will Crespo MD   order for DME AIRSENSE 10  10-15CM/H20  PILLOWS CHRISTIANSON FX FOR HER   Yes Reported, Patient   PROGRAF (BRAND) 1 MG CAPSULE Take 2 capsules (2 mg) by mouth 2 times daily 6/22/18  Yes Will Crespo MD   sodium bicarbonate 650  "MG tablet Take 2 tablets (1,300 mg) by mouth 2 times daily 6/26/17  Yes Will Crespo MD   sulfamethoxazole-trimethoprim (BACTRIM/SEPTRA) 400-80 MG per tablet Take 1 tablet by mouth Every Mon, Wed, Fri Morning 1/3/18  Yes aMx Haas MD   venlafaxine (EFFEXOR) 75 MG tablet Unsure of dose 6/19/17  Yes Will Crespo MD   ASPIRIN PO Take 81 mg by mouth daily.    Reported, Patient     Vitals:  /79  Pulse 98  Ht 1.727 m (5' 8\")  Wt 137.4 kg (303 lb)  SpO2 98%  BMI 46.07 kg/m2    Exam:   GENERAL APPEARANCE: alert and no distress  HENT: mouth without ulcers or lesions  LYMPHATICS: no cervical or supraclavicular nodes  RESP: lungs clear to auscultation - no rales, rhonchi or wheezes  CV: regular rhythm, normal rate, no rub, no murmur  EDEMA: no LE edema bilaterally  ABDOMEN: soft, nondistended, nontender, bowel sounds normal  MS: extremities normal - no gross deformities noted, no evidence of inflammation in joints, no muscle tenderness  SKIN: no rash    Results:   Labs     "

## 2018-07-05 NOTE — PATIENT INSTRUCTIONS
Start atorvastatin 10 mg AT NIGHT for cholesterol.    Continue labs as you are doing.    Follow up in 1 year.    Work on calorie restriction and burning calories with activity.

## 2018-07-05 NOTE — LETTER
7/5/2018      RE: Delilah Fountain  [address]  [address]  [facility] [address]       Assessment and Plan:  1. 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 Currently on immunosuppression with:    Tacrolimus 2 mg po bid - 6.6 ng / ml. Goal 5-8 ng / ml  Azathioprine 150 mg daily    UPC: 0.06 g / g  DSA:  Negative last in September.  BK: BK last negative on 6/25/18.     Amylase/lipase: 33/80, respectively and stable.     Glucose: fasting sugars.     A1c: last a1c was 5.6 g %    Creatinine baseline is 0.7 mg / dl.    2. Immunosuppression: see above.     3. Met acidosis: sodium bicarbonate supplementation. Goal c02 > 22 meq / l    4. HLD: start atorvastatin 10 mg po qhs.    5. Need for pneumocystis prophylaxis: bactrim ss tab daily.     6. HTN: BP elevated today, but at goal at home. White coat HTN> continue losartan.     Assessment and plan was discussed with patient and she voiced her understanding and agreement.    Reason for Visit:  Ms. Fountain is here for routine follow up of SPK.    HPI:   Delilah Fountain is a 52 year old female with ESKD from Type 1 Diabetes and is status post SPK on 7/12/2000.         Transplant Hx:       Tx: SPK  Date: 7/12/2000       Present Maintenance IS: Tacrolimus and Azathioprine       Baseline Creatinine: 0.7 mg / dl       Recent DSA: No         Biopsy: No    The patient is a 52-year-old female with history of type 1 diabetes status post simultaneous pancreas and kidney transplant on 7/12/2000.  Patient is here for follow-up of her transplants.    The patient is doing well.  She specifically denies any chest pain or shortness breath.  She has no nausea or vomiting.  She has no fever shakes chills.  She is moving her bowels appropriately.    She is having problems in terms of weight gain.  We discussed caloric restriction as well as increasing physical activity to be able to burn off calories.  Next    She is on chronic amino suppression with tacrolimus and  azathioprine.    Her allograft functions are excellent.  She last had an A1c of 5.6 g percent.  Her creatinine at baseline is 0.7 mg/dL and stable.    Blood pressure today is at goal.  Her only change that are we are making is adding atorvastatin 10 mg p.o. nightly for her hyperlipidemia with goal LDL less than 100 ideally less than 70 mg/dL.    Home BP: at goal.      ROS:   A comprehensive review of systems was obtained and negative, except as noted in the HPI or PMH.    Active Medical Problems:  Patient Active Problem List   Diagnosis     Hernia of abdominal cavity, with obstruction     Hernia of abdominal wall     Knee pain, bilateral     Hip pain     HTN (hypertension)     Personal Hx:  Social History     Social History     Marital status: Single     Spouse name: N/A     Number of children: N/A     Years of education: N/A     Occupational History     Not on file.     Social History Main Topics     Smoking status: Never Smoker     Smokeless tobacco: Never Used     Alcohol use Yes      Comment: rarely     Drug use: No     Sexual activity: Not on file     Other Topics Concern     Not on file     Social History Narrative     Allergies:  No Known Allergies    Medications:  Prior to Admission medications    Medication Sig Start Date End Date Taking? Authorizing Provider   azaTHIOprine (IMURAN) 50 MG tablet Take 3 tablets (150 mg) by mouth daily 8/23/17  Yes Will Crespo MD   calcium carbonate (OS-RENATO 500 MG [place]. [place]) 500 MG tablet Take 500 mg by mouth 2 times daily   Yes Reported, Patient   Cetirizine HCl (ZYRTEC ALLERGY PO) Take 5 mg by mouth 2 times daily   Yes Reported, Patient   cholecalciferol (VITAMIN  -D) 1000 UNITS capsule Take 1 capsule by mouth daily. 6/27/17  Yes Will Crespo MD   losartan (COZAAR) 100 MG tablet TAKE 1/2 TABLET EVERY DAY 3/27/18  Yes Will Crespo MD   order for DME AIRSENSE 10  10-15CM/H20  PILLOWS CHRISTIANSON FX FOR HER   Yes Reported, Patient   PROGRAF (BRAND) 1 MG CAPSULE Take 2  "capsules (2 mg) by mouth 2 times daily 6/22/18  Yes Will Crespo MD   sodium bicarbonate 650 MG tablet Take 2 tablets (1,300 mg) by mouth 2 times daily 6/26/17  Yes Will Crespo MD   sulfamethoxazole-trimethoprim (BACTRIM/SEPTRA) 400-80 MG per tablet Take 1 tablet by mouth Every Mon, Wed, Fri Morning 1/3/18  Yes Max Haas MD   venlafaxine (EFFEXOR) 75 MG tablet Unsure of dose 6/19/17  Yes Will Crespo MD   ASPIRIN PO Take 81 mg by mouth daily.    Reported, Patient     Vitals:  /79  Pulse 98  Ht 1.727 m (5' 8\")  Wt 137.4 kg (303 lb)  SpO2 98%  BMI 46.07 kg/m2    Exam:   GENERAL APPEARANCE: alert and no distress  HENT: mouth without ulcers or lesions  LYMPHATICS: no cervical or supraclavicular nodes  RESP: lungs clear to auscultation - no rales, rhonchi or wheezes  CV: regular rhythm, normal rate, no rub, no murmur  EDEMA: no LE edema bilaterally  ABDOMEN: soft, nondistended, nontender, bowel sounds normal  MS: extremities normal - no gross deformities noted, no evidence of inflammation in joints, no muscle tenderness  SKIN: no rash    Results:   Labs       Kidney/Pancreas Recipient      "

## 2018-07-05 NOTE — MR AVS SNAPSHOT
After Visit Summary   7/5/2018    Delilah Fountain    MRN: [id number]           Patient Information     Date Of Birth          1965        Visit Information        Provider Department      7/5/2018 4:00 PM Recipient, [facility] Kidney/Pancreas [facility] Nephrology        Today's Diagnoses     Hyperlipidemia, unspecified hyperlipidemia type    -  1      Care Instructions    Start atorvastatin 10 mg AT NIGHT for cholesterol.    Continue labs as you are doing.    Follow up in 1 year.    Work on calorie restriction and burning calories with activity.          Follow-ups after your visit        Who to contact     If you have questions or need follow up information about today's clinic visit or your schedule please contact [facility] directly at [phone].  Normal or non-critical lab and imaging results will be communicated to you by [facility], letter or phone within 4 business days after the clinic has received the results. If you do not hear from us within 7 days, please contact the clinic through [facility] or phone. If you have a critical or abnormal lab result, we will notify you by phone as soon as possible.  Submit refill requests through [employer] or call your pharmacy and they will forward the refill request to us. Please allow 3 business days for your refill to be completed.          Additional Information About Your Visit        MyChart Information     [employer] gives you secure access to your electronic health record. If you see a primary care provider, you can also send messages to your care team and make appointments. If you have questions, please call your primary care clinic.  If you do not have a primary care provider, please call [phone] and they will assist you.        Care EveryWhere ID     This is your Care EveryWhere ID. This could be used by other organizations to access your [facility] medical records  [id number]        Your Vitals Were     Pulse Height Pulse Oximetry BMI  "(Body Mass Index)          98 1.727 m (5' 8\") 98% 46.07 kg/m2         Blood Pressure from Last 3 Encounters:   07/05/18 160/79   06/19/17 117/85   12/01/16 129/80    Weight from Last 3 Encounters:   07/05/18 137.4 kg (303 lb)   06/19/17 129.9 kg (286 lb 6.4 oz)   12/01/16 134 kg (295 lb 8 oz)              Today, you had the following     No orders found for display         Today's Medication Changes          These changes are accurate as of 7/5/18  4:42 PM.  If you have any questions, ask your nurse or doctor.               Start taking these medicines.        Dose/Directions    atorvastatin 10 MG tablet   Commonly known as:  LIPITOR   Used for:  Hyperlipidemia, unspecified hyperlipidemia type   Started by:  Recipient,  Kidney/Pancreas        Dose:  10 mg   Take 1 tablet (10 mg) by mouth daily   Quantity:  90 tablet   Refills:  3            Where to get your medicines      These medications were sent to [facility] [address] [place] [address]     Phone:  [phone]     atorvastatin 10 MG tablet                Primary Care Provider Office Phone # Fax #    Alban Clark [phone] [address] [address]        Equal Access to Services     OMID MCFARLANE AH: Hadii jimbo patrick hadasho Sonevaehali, waaxda luqadaha, qaybta kaalmada adeegden, danika reno. So [facility] [phone].    ATENCIÓN: Si habla español, tiene a ford disposición servicios gratuitos de asistencia lingüística. Llame al [phone].    We comply with applicable federal civil rights laws and [place] laws. We do not discriminate on the basis of race, color, national origin, age, disability, sex, sexual orientation, or gender identity.            Thank you!     Thank you for choosing [facility]  for your care. Our goal is always to provide you with excellent care. Hearing back from our patients is one way we " can continue to improve our services. Please take a few minutes to complete the written survey that you may receive in the mail after your visit with us. Thank you!             Your Updated Medication List - Protect others around you: Learn how to safely use, store and throw away your medicines at www.disposemymeds.org.          This list is accurate as of 7/5/18  4:42 PM.  Always use your most recent med list.                   Brand Name Dispense Instructions for use Diagnosis    ASPIRIN PO      Take 81 mg by mouth daily.        atorvastatin 10 MG tablet    LIPITOR    90 tablet    Take 1 tablet (10 mg) by mouth daily    Hyperlipidemia, unspecified hyperlipidemia type       azaTHIOprine 50 MG tablet    IMURAN    90 tablet    Take 3 tablets (150 mg) by mouth daily    Kidney transplanted, Pancreas replaced by transplant (H)       calcium carbonate 500 MG tablet    OS-RENATO 500 mg [place]. Ca     Take 500 mg by mouth 2 times daily        cholecalciferol 1000 units capsule    vitamin  -D    30 capsule    Take 1 capsule by mouth daily.    Kidney replaced by transplant       losartan 100 MG tablet    COZAAR    45 tablet    TAKE 1/2 TABLET EVERY DAY    Hypertension       order for DME      AIRSENSE 10 10-15CM/H20 PILLOWS CHRISTIANSON FX FOR HER        sodium bicarbonate 650 MG tablet     360 tablet    Take 2 tablets (1,300 mg) by mouth 2 times daily    Kidney replaced by transplant       sulfamethoxazole-trimethoprim 400-80 MG per tablet    BACTRIM/SEPTRA    40 tablet    Take 1 tablet by mouth Every Mon, Wed, Fri Morning    Pancreas replaced by transplant (H)       tacrolimus 1 MG capsule     120 capsule    Take 2 capsules (2 mg) by mouth 2 times daily    Kidney transplanted       venlafaxine 75 MG tablet    EFFEXOR    1 tablet    Unsure of dose        ZYRTEC ALLERGY PO      Take 5 mg by mouth 2 times daily

## 2018-07-05 NOTE — NURSING NOTE
"Chief Complaint   Patient presents with     RECHECK     Kidney tx follow up     /79  Pulse 98  Ht 1.727 m (5' 8\")  Wt 137.4 kg (303 lb)  SpO2 98%  BMI 46.07 kg/m2  DESIREE POMPA CMA    "

## 2018-07-23 DIAGNOSIS — Z94.0 KIDNEY TRANSPLANTED: Primary | ICD-10-CM

## 2018-07-23 RX ORDER — TACROLIMUS 1 MG/1
2 CAPSULE, GELATIN COATED ORAL 2 TIMES DAILY
Qty: 120 CAPSULE | Refills: 11 | Status: SHIPPED | OUTPATIENT
Start: 2018-07-23 | End: 2019-07-25

## 2018-08-24 DIAGNOSIS — Z94.83 PANCREAS REPLACED BY TRANSPLANT (H): ICD-10-CM

## 2018-08-24 DIAGNOSIS — Z94.0 KIDNEY TRANSPLANTED: ICD-10-CM

## 2018-08-24 RX ORDER — AZATHIOPRINE 50 MG/1
150 TABLET ORAL DAILY
Qty: 90 TABLET | Refills: 11 | Status: SHIPPED | OUTPATIENT
Start: 2018-08-24 | End: 2019-10-03

## 2018-10-08 ENCOUNTER — TELEPHONE (OUTPATIENT)
Dept: TRANSPLANT | Facility: CLINIC | Age: 53
End: 2018-10-08

## 2018-10-08 ENCOUNTER — TELEPHONE (OUTPATIENT)
Dept: PHARMACY | Facility: CLINIC | Age: 53
End: 2018-10-08

## 2018-10-08 NOTE — TELEPHONE ENCOUNTER
Patient has switched to generic tacrolimus due to loss of insurance. The discharge pharmacy filled a couple days supply over the weekend as the patient was out. [facility] will be filling generic for her and our  will reach out to social work to see if they can assist her in any way. Patient should follow up with coordinator to get tacro labs if needed.  will be calling her after she speaks with social work to discuss her options and tell her she needs to follow up with her coordinator.

## 2018-10-08 NOTE — TELEPHONE ENCOUNTER
Patient Call: General  Route to LPN    Reason for call: Pt returned VM at 4:42pm. Please return pts call when available.     Call back needed? Yes    Return Call Needed  Same as documented in contacts section  When to return call?: Same day: Route High Priority

## 2018-10-08 NOTE — TELEPHONE ENCOUNTER
Patient Call: General  Route to LPN    Reason for call: Patient returning call.Connie call patient back    Call back needed? Yes    Return Call Needed  Same as documented in contacts section  Urgent

## 2018-10-08 NOTE — TELEPHONE ENCOUNTER
ISSUE:  Pt's tacrolimus switched from brand to generic.      PLAN:   Pt will need weekly drug level x 4.     OUTCOME:   Call placed to pt, no answer.  Left detailed message letting pt know we will need weekly drug levels to ensure levels remain stable.  Pt encouraged to return call letting us know she received our VM and where she would like to go for labs.

## 2018-10-09 ENCOUNTER — TELEPHONE (OUTPATIENT)
Dept: PHARMACY | Facility: CLINIC | Age: 53
End: 2018-10-09

## 2018-10-09 NOTE — TELEPHONE ENCOUNTER
Left message for patient regarding Pt's tacrolimus switched from brand to generic.       PLAN:   Pt will need weekly drug level x 4.      OUTCOME:   Call placed to pt, no answer.  Left detailed message letting pt know we will need weekly drug levels to ensure levels remain stable.  Pt encouraged to return call letting us know she received our VM and where she would like to go for labs

## 2018-10-09 NOTE — TELEPHONE ENCOUNTER
Hunter Raines, [facility]  Agata Dang, RN Cc: [facility]                   Hi Margret,     After all the hoopla about switching from brand Prograf to generic tacro, the patient decided she didn't want to switch. She is back on brand name as of this afternoon I'm guessing, we just shipped a month of brand to her today. We have a call into social work to see if they will help pay.     Thanks,   Hunter Raines PharmD   [facility]   [phone]       Call placed to pt informing her because she is staying on brand prograf, she no longer needs to have weekly drug levels checked.  Pt educated we would recommend she has monthly transplant labs d/t prior frequency between labs have been up to 1 year.  Pt v/u and verbalized she will get labs in November when ins starts back up.

## 2018-10-09 NOTE — TELEPHONE ENCOUNTER
Patient changed her mind about wanting generic tacrolimus. We shipped out brand Prograf to her today. I will let Margret know.

## 2018-10-10 DIAGNOSIS — Z94.0 KIDNEY REPLACED BY TRANSPLANT: Primary | ICD-10-CM

## 2018-10-10 DIAGNOSIS — Z94.83 PANCREAS REPLACED BY TRANSPLANT (H): ICD-10-CM

## 2019-04-01 ENCOUNTER — TELEPHONE (OUTPATIENT)
Dept: NEPHROLOGY | Facility: CLINIC | Age: 54
End: 2019-04-01

## 2019-04-01 NOTE — TELEPHONE ENCOUNTER
PA Initiation    Medication: prograf  Insurance Company: [place] Medicaid ([facility]) - Phone [phone] Fax [phone]  Pharmacy Filling the Rx:    Filling Pharmacy Phone:    Filling Pharmacy Fax:    Start Date: 4/1/2019    [facility] Authorization Team   Phone: [phone]  Fax: [phone]

## 2019-04-03 NOTE — TELEPHONE ENCOUNTER
Prior Authorization Approval    Authorization Effective Date: 4/1/2019  Authorization Expiration Date: 4/30/2019  Medication: prograf  Approved Dose/Quantity: 120  Reference #: KEY: [id number]   Insurance Company: [place] Medicaid ([facility]) - Phone [phone] Fax [phone]  Expected CoPay: $25.00     CoPay Card Available:      Foundation Assistance Needed:    Which Pharmacy is filling the prescription (Not needed for infusion/clinic administered):    Pharmacy Notified: No  Patient Notified: [facility] Prior Authorization Team   Phone: [phone]  Fax: [phone]

## 2019-04-30 DIAGNOSIS — I10 HYPERTENSION: ICD-10-CM

## 2019-04-30 RX ORDER — LOSARTAN POTASSIUM 100 MG/1
TABLET ORAL
Qty: 90 TABLET | Refills: 0 | Status: SHIPPED | OUTPATIENT
Start: 2019-04-30 | End: 2020-02-28

## 2019-04-30 NOTE — TELEPHONE ENCOUNTER
Medication: Nephrology RN Care Coordinator received prescription refill fax from [facility] pharmacy in [place] fax# [phone] for Cozaar 100mg sig: take 1/2 tablet by mouth daily    Last office visit: 7/5/2018  Upcoming office visit: no nephrology appts scheduled  BP Readings from Last 5 Encounters:   07/05/18 160/79   06/19/17 117/85   12/01/16 129/80   09/30/16 130/78   11/20/15 138/86     Last Comprehensive Metabolic Panel:  Sodium   Date Value Ref Range Status   06/25/2018 143 133 - 144 mmol/L Final     Potassium   Date Value Ref Range Status   06/25/2018 3.8 3.4 - 5.3 mmol/L Final     Chloride   Date Value Ref Range Status   06/25/2018 109 94 - 109 mmol/L Final     Carbon Dioxide   Date Value Ref Range Status   06/25/2018 22 20 - 32 mmol/L Final     Anion Gap   Date Value Ref Range Status   06/25/2018 12 3 - 14 mmol/L Final     Glucose   Date Value Ref Range Status   06/25/2018 119 (H) 70 - 99 mg/dL Final     Urea Nitrogen   Date Value Ref Range Status   06/25/2018 13 7 - 30 mg/dL Final     Creatinine   Date Value Ref Range Status   06/25/2018 0.68 0.52 - 1.04 mg/dL Final     GFR Estimate   Date Value Ref Range Status   06/25/2018 >90 >60 mL/min/1.7m2 Final     Comment:     Non  GFR Calc     Calcium   Date Value Ref Range Status   06/25/2018 9.0 8.5 - 10.1 mg/dL Final       Medication refilled per Nephrology medication protocol    Rissa Michelle, RN, BSN  Nephrology Care Coordinator  [facility]  [email or web address]  [phone]

## 2019-05-03 ENCOUNTER — TELEPHONE (OUTPATIENT)
Dept: NEPHROLOGY | Facility: CLINIC | Age: 54
End: 2019-05-03

## 2019-05-03 NOTE — TELEPHONE ENCOUNTER
[facility] Prior Authorization Team   Phone: [phone]  Fax: [phone]    Prior Authorization Approval    Authorization Effective Date: 5/1/2019  Authorization Expiration Date: 5/1/2020  Medication: PROGRAF DAW1 - [place] APPR[place]  Approved Dose/Quantity:   Reference #: [id number]   Insurance Company: [employer] - Phone [phone] Fax [phone]  Expected CoPay:       CoPay Card Available:      Foundation Assistance Needed:    Which Pharmacy is filling the prescription (Not needed for infusion/clinic administered): [facility] - [place], [place] - [address]  Pharmacy Notified: Yes  Patient Notified: Yes

## 2019-07-25 DIAGNOSIS — Z94.0 KIDNEY TRANSPLANTED: ICD-10-CM

## 2019-07-25 RX ORDER — TACROLIMUS 1 MG/1
2 CAPSULE, GELATIN COATED ORAL 2 TIMES DAILY
Qty: 120 CAPSULE | Refills: 11 | Status: SHIPPED | OUTPATIENT
Start: 2019-07-25 | End: 2020-08-17

## 2019-07-29 ENCOUNTER — TELEPHONE (OUTPATIENT)
Dept: TRANSPLANT | Facility: CLINIC | Age: 54
End: 2019-07-29

## 2019-07-29 NOTE — TELEPHONE ENCOUNTER
Called reports that her mother    In thet past year -   Reports that she has not had labs  Encourage for her to obtain labs and return for follow up appointments

## 2019-09-29 ENCOUNTER — HEALTH MAINTENANCE LETTER (OUTPATIENT)
Age: 54
End: 2019-09-29

## 2019-10-02 DIAGNOSIS — Z94.0 KIDNEY TRANSPLANTED: ICD-10-CM

## 2019-10-02 DIAGNOSIS — Z94.83 PANCREAS REPLACED BY TRANSPLANT (H): ICD-10-CM

## 2019-10-02 NOTE — TELEPHONE ENCOUNTER
Patient Call: Medication Refill  Route to LPN  Instruct the patient to first contact their pharmacy. If they have called their pharmacy and require further assistance, route to LPN.    Pharmacy Name: [facility]   Pharmacy Location: [facility] [address]  Name of Medication: azaTHIOprine (IMURAN) 50 MG tablet  When will the patient be out of this medication?: Less than 24 hours [facility] then [facility] if no answer)     Patient was out 2 days ago. Pharmacy did not send refill request.

## 2019-10-03 RX ORDER — AZATHIOPRINE 50 MG/1
150 TABLET ORAL DAILY
Qty: 90 TABLET | Refills: 0 | Status: SHIPPED | OUTPATIENT
Start: 2019-10-03 | End: 2020-03-02

## 2019-10-03 NOTE — TELEPHONE ENCOUNTER
Call placed to patient. No answer. Voice message left with instructions for patient to complete transplant appointment and labs for future refills.

## 2019-10-08 DIAGNOSIS — Z94.0 KIDNEY REPLACED BY TRANSPLANT: ICD-10-CM

## 2019-10-08 DIAGNOSIS — E78.5 HYPERLIPIDEMIA, UNSPECIFIED HYPERLIPIDEMIA TYPE: ICD-10-CM

## 2019-10-08 RX ORDER — ATORVASTATIN CALCIUM 10 MG/1
10 TABLET, FILM COATED ORAL DAILY
Qty: 90 TABLET | Refills: 3 | Status: SHIPPED | OUTPATIENT
Start: 2019-10-08 | End: 2022-06-27

## 2020-02-28 ENCOUNTER — APPOINTMENT (OUTPATIENT)
Dept: GENERAL RADIOLOGY | Facility: CLINIC | Age: 55
End: 2020-02-28
Attending: EMERGENCY MEDICINE
Payer: COMMERCIAL

## 2020-02-28 ENCOUNTER — HOSPITAL ENCOUNTER (OUTPATIENT)
Facility: CLINIC | Age: 55
Setting detail: OBSERVATION
Discharge: HOME | End: 2020-02-29
Attending: EMERGENCY MEDICINE | Admitting: EMERGENCY MEDICINE
Payer: COMMERCIAL

## 2020-02-28 DIAGNOSIS — I26.99 OTHER ACUTE PULMONARY EMBOLISM WITHOUT ACUTE COR PULMONALE (H): Primary | ICD-10-CM

## 2020-02-28 DIAGNOSIS — R06.00 DYSPNEA, UNSPECIFIED TYPE: ICD-10-CM

## 2020-02-28 LAB
ALBUMIN SERPL-MCNC: 3 G/DL (ref 3.4–5)
ALP SERPL-CCNC: 83 U/L (ref 40–150)
ALT SERPL W P-5'-P-CCNC: 17 U/L (ref 0–50)
ANION GAP SERPL CALCULATED.3IONS-SCNC: 4 MMOL/L (ref 3–14)
AST SERPL W P-5'-P-CCNC: 21 U/L (ref 0–45)
BASOPHILS # BLD AUTO: 0 10E9/L (ref 0–0.2)
BASOPHILS NFR BLD AUTO: 0.2 %
BILIRUB SERPL-MCNC: 0.4 MG/DL (ref 0.2–1.3)
BUN SERPL-MCNC: 13 MG/DL (ref 7–30)
CALCIUM SERPL-MCNC: 8.9 MG/DL (ref 8.5–10.1)
CHLORIDE SERPL-SCNC: 110 MMOL/L (ref 94–109)
CO2 SERPL-SCNC: 26 MMOL/L (ref 20–32)
CREAT SERPL-MCNC: 0.7 MG/DL (ref 0.52–1.04)
DIFFERENTIAL METHOD BLD: NORMAL
EOSINOPHIL # BLD AUTO: 0.2 10E9/L (ref 0–0.7)
EOSINOPHIL NFR BLD AUTO: 1.6 %
ERYTHROCYTE [DISTWIDTH] IN BLOOD BY AUTOMATED COUNT: 13.8 % (ref 10–15)
GFR SERPL CREATININE-BSD FRML MDRD: >90 ML/MIN/{1.73_M2}
GLUCOSE SERPL-MCNC: 109 MG/DL (ref 70–99)
HCT VFR BLD AUTO: 44.2 % (ref 35–47)
HGB BLD-MCNC: 14.5 G/DL (ref 11.7–15.7)
IMM GRANULOCYTES # BLD: 0.1 10E9/L (ref 0–0.4)
IMM GRANULOCYTES NFR BLD: 0.6 %
LIPASE SERPL-CCNC: 57 U/L (ref 73–393)
LYMPHOCYTES # BLD AUTO: 1 10E9/L (ref 0.8–5.3)
LYMPHOCYTES NFR BLD AUTO: 10.4 %
MCH RBC QN AUTO: 31 PG (ref 26.5–33)
MCHC RBC AUTO-ENTMCNC: 32.8 G/DL (ref 31.5–36.5)
MCV RBC AUTO: 94 FL (ref 78–100)
MONOCYTES # BLD AUTO: 0.8 10E9/L (ref 0–1.3)
MONOCYTES NFR BLD AUTO: 8.1 %
NEUTROPHILS # BLD AUTO: 7.5 10E9/L (ref 1.6–8.3)
NEUTROPHILS NFR BLD AUTO: 79.1 %
NRBC # BLD AUTO: 0 10*3/UL
NRBC BLD AUTO-RTO: 0 /100
NT-PROBNP SERPL-MCNC: 113 PG/ML (ref 0–900)
PLATELET # BLD AUTO: 240 10E9/L (ref 150–450)
POTASSIUM SERPL-SCNC: 4.1 MMOL/L (ref 3.4–5.3)
PROT SERPL-MCNC: 7.6 G/DL (ref 6.8–8.8)
RBC # BLD AUTO: 4.68 10E12/L (ref 3.8–5.2)
SODIUM SERPL-SCNC: 140 MMOL/L (ref 133–144)
TROPONIN I SERPL-MCNC: <0.015 UG/L (ref 0–0.04)
WBC # BLD AUTO: 9.5 10E9/L (ref 4–11)

## 2020-02-28 PROCEDURE — 83880 ASSAY OF NATRIURETIC PEPTIDE: CPT | Performed by: EMERGENCY MEDICINE

## 2020-02-28 PROCEDURE — 83690 ASSAY OF LIPASE: CPT | Performed by: EMERGENCY MEDICINE

## 2020-02-28 PROCEDURE — 80053 COMPREHEN METABOLIC PANEL: CPT | Performed by: EMERGENCY MEDICINE

## 2020-02-28 PROCEDURE — 85379 FIBRIN DEGRADATION QUANT: CPT | Performed by: EMERGENCY MEDICINE

## 2020-02-28 PROCEDURE — 93010 ELECTROCARDIOGRAM REPORT: CPT | Mod: Z6 | Performed by: EMERGENCY MEDICINE

## 2020-02-28 PROCEDURE — 85025 COMPLETE CBC W/AUTO DIFF WBC: CPT | Performed by: EMERGENCY MEDICINE

## 2020-02-28 PROCEDURE — 93005 ELECTROCARDIOGRAM TRACING: CPT | Performed by: EMERGENCY MEDICINE

## 2020-02-28 PROCEDURE — 99285 EMERGENCY DEPT VISIT HI MDM: CPT | Mod: 25 | Performed by: EMERGENCY MEDICINE

## 2020-02-28 PROCEDURE — 84484 ASSAY OF TROPONIN QUANT: CPT | Performed by: EMERGENCY MEDICINE

## 2020-02-28 PROCEDURE — 81001 URINALYSIS AUTO W/SCOPE: CPT | Performed by: EMERGENCY MEDICINE

## 2020-02-28 PROCEDURE — 71046 X-RAY EXAM CHEST 2 VIEWS: CPT

## 2020-02-28 ASSESSMENT — MIFFLIN-ST. JEOR: SCORE: 2057.59

## 2020-02-29 ENCOUNTER — APPOINTMENT (OUTPATIENT)
Dept: CARDIOLOGY | Facility: CLINIC | Age: 55
End: 2020-02-29
Attending: NURSE PRACTITIONER
Payer: COMMERCIAL

## 2020-02-29 ENCOUNTER — APPOINTMENT (OUTPATIENT)
Dept: CT IMAGING | Facility: CLINIC | Age: 55
End: 2020-02-29
Attending: NURSE PRACTITIONER
Payer: COMMERCIAL

## 2020-02-29 VITALS
BODY MASS INDEX: 43.4 KG/M2 | HEIGHT: 69 IN | SYSTOLIC BLOOD PRESSURE: 119 MMHG | RESPIRATION RATE: 16 BRPM | HEART RATE: 89 BPM | WEIGHT: 293 LBS | OXYGEN SATURATION: 93 % | TEMPERATURE: 98.3 F | DIASTOLIC BLOOD PRESSURE: 83 MMHG

## 2020-02-29 LAB
ALBUMIN UR-MCNC: NEGATIVE MG/DL
AMORPH CRY #/AREA URNS HPF: ABNORMAL /HPF
AMYLASE SERPL-CCNC: 43 U/L (ref 30–110)
APPEARANCE UR: CLEAR
BILIRUB UR QL STRIP: NEGATIVE
COLOR UR AUTO: YELLOW
D DIMER PPP FEU-MCNC: 6 UG/ML FEU (ref 0–0.5)
GLUCOSE UR STRIP-MCNC: NEGATIVE MG/DL
HGB UR QL STRIP: ABNORMAL
KETONES UR STRIP-MCNC: NEGATIVE MG/DL
LEUKOCYTE ESTERASE UR QL STRIP: NEGATIVE
NITRATE UR QL: NEGATIVE
PH UR STRIP: 6.5 PH (ref 5–7)
RADIOLOGIST FLAGS: ABNORMAL
RBC #/AREA URNS AUTO: 5 /HPF (ref 0–2)
SOURCE: ABNORMAL
SP GR UR STRIP: 1.02 (ref 1–1.03)
SQUAMOUS #/AREA URNS AUTO: 5 /HPF (ref 0–1)
TROPONIN I SERPL-MCNC: <0.015 UG/L (ref 0–0.04)
UROBILINOGEN UR STRIP-MCNC: NORMAL MG/DL (ref 0–2)
WBC #/AREA URNS AUTO: 1 /HPF (ref 0–5)

## 2020-02-29 PROCEDURE — 99205 OFFICE O/P NEW HI 60 MIN: CPT | Mod: GC | Performed by: INTERNAL MEDICINE

## 2020-02-29 PROCEDURE — 25000131 ZZH RX MED GY IP 250 OP 636 PS 637: Performed by: PHYSICIAN ASSISTANT

## 2020-02-29 PROCEDURE — 99234 HOSP IP/OBS SM DT SF/LOW 45: CPT | Mod: Z6 | Performed by: EMERGENCY MEDICINE

## 2020-02-29 PROCEDURE — 86146 BETA-2 GLYCOPROTEIN ANTIBODY: CPT | Performed by: INTERNAL MEDICINE

## 2020-02-29 PROCEDURE — 85730 THROMBOPLASTIN TIME PARTIAL: CPT | Performed by: INTERNAL MEDICINE

## 2020-02-29 PROCEDURE — 82150 ASSAY OF AMYLASE: CPT | Performed by: PHYSICIAN ASSISTANT

## 2020-02-29 PROCEDURE — 93325 DOPPLER ECHO COLOR FLOW MAPG: CPT | Mod: 26 | Performed by: INTERNAL MEDICINE

## 2020-02-29 PROCEDURE — 86147 CARDIOLIPIN ANTIBODY EA IG: CPT | Performed by: INTERNAL MEDICINE

## 2020-02-29 PROCEDURE — G0378 HOSPITAL OBSERVATION PER HR: HCPCS

## 2020-02-29 PROCEDURE — 25000132 ZZH RX MED GY IP 250 OP 250 PS 637: Performed by: PHYSICIAN ASSISTANT

## 2020-02-29 PROCEDURE — 71275 CT ANGIOGRAPHY CHEST: CPT

## 2020-02-29 PROCEDURE — 84484 ASSAY OF TROPONIN QUANT: CPT | Performed by: PHYSICIAN ASSISTANT

## 2020-02-29 PROCEDURE — 25000128 H RX IP 250 OP 636: Performed by: STUDENT IN AN ORGANIZED HEALTH CARE EDUCATION/TRAINING PROGRAM

## 2020-02-29 PROCEDURE — 25800030 ZZH RX IP 258 OP 636: Performed by: NURSE PRACTITIONER

## 2020-02-29 PROCEDURE — 93018 CV STRESS TEST I&R ONLY: CPT | Performed by: INTERNAL MEDICINE

## 2020-02-29 PROCEDURE — 25000128 H RX IP 250 OP 636: Performed by: INTERNAL MEDICINE

## 2020-02-29 PROCEDURE — 25000132 ZZH RX MED GY IP 250 OP 250 PS 637: Performed by: EMERGENCY MEDICINE

## 2020-02-29 PROCEDURE — 85613 RUSSELL VIPER VENOM DILUTED: CPT | Performed by: INTERNAL MEDICINE

## 2020-02-29 PROCEDURE — 93321 DOPPLER ECHO F-UP/LMTD STD: CPT | Mod: 26 | Performed by: INTERNAL MEDICINE

## 2020-02-29 PROCEDURE — 93321 DOPPLER ECHO F-UP/LMTD STD: CPT | Mod: TC

## 2020-02-29 PROCEDURE — 25000125 ZZHC RX 250: Performed by: INTERNAL MEDICINE

## 2020-02-29 PROCEDURE — 00000167 ZZHCL STATISTIC INR NC: Performed by: INTERNAL MEDICINE

## 2020-02-29 PROCEDURE — 25000128 H RX IP 250 OP 636: Performed by: NURSE PRACTITIONER

## 2020-02-29 PROCEDURE — 36415 COLL VENOUS BLD VENIPUNCTURE: CPT | Performed by: PHYSICIAN ASSISTANT

## 2020-02-29 PROCEDURE — 36415 COLL VENOUS BLD VENIPUNCTURE: CPT | Performed by: INTERNAL MEDICINE

## 2020-02-29 PROCEDURE — 00000401 ZZHCL STATISTIC THROMBIN TIME NC: Performed by: INTERNAL MEDICINE

## 2020-02-29 PROCEDURE — 25500064 ZZH RX 255 OP 636: Performed by: INTERNAL MEDICINE

## 2020-02-29 PROCEDURE — 93016 CV STRESS TEST SUPVJ ONLY: CPT | Performed by: INTERNAL MEDICINE

## 2020-02-29 PROCEDURE — 93350 STRESS TTE ONLY: CPT | Mod: 26 | Performed by: INTERNAL MEDICINE

## 2020-02-29 PROCEDURE — 96372 THER/PROPH/DIAG INJ SC/IM: CPT | Mod: XS

## 2020-02-29 RX ORDER — WARFARIN SODIUM 5 MG/1
5 TABLET ORAL
Status: COMPLETED | OUTPATIENT
Start: 2020-02-29 | End: 2020-02-29

## 2020-02-29 RX ORDER — NITROGLYCERIN 0.4 MG/1
0.4 TABLET SUBLINGUAL EVERY 5 MIN PRN
Status: DISCONTINUED | OUTPATIENT
Start: 2020-02-29 | End: 2020-02-29 | Stop reason: HOSPADM

## 2020-02-29 RX ORDER — LIDOCAINE 40 MG/G
CREAM TOPICAL
Status: DISCONTINUED | OUTPATIENT
Start: 2020-02-29 | End: 2020-02-29 | Stop reason: HOSPADM

## 2020-02-29 RX ORDER — SODIUM CHLORIDE 9 MG/ML
INJECTION, SOLUTION INTRAVENOUS CONTINUOUS
Status: DISCONTINUED | OUTPATIENT
Start: 2020-02-29 | End: 2020-02-29 | Stop reason: HOSPADM

## 2020-02-29 RX ORDER — VENLAFAXINE 75 MG/1
75 TABLET ORAL DAILY
Status: DISCONTINUED | OUTPATIENT
Start: 2020-02-29 | End: 2020-02-29

## 2020-02-29 RX ORDER — SODIUM BICARBONATE 650 MG/1
1300 TABLET ORAL 2 TIMES DAILY
Status: DISCONTINUED | OUTPATIENT
Start: 2020-02-29 | End: 2020-02-29

## 2020-02-29 RX ORDER — WARFARIN SODIUM 5 MG/1
5 TABLET ORAL DAILY
Qty: 20 TABLET | Refills: 0 | Status: SHIPPED | OUTPATIENT
Start: 2020-02-29 | End: 2020-06-12 | Stop reason: ALTCHOICE

## 2020-02-29 RX ORDER — ACETAMINOPHEN 650 MG/1
650 SUPPOSITORY RECTAL EVERY 4 HOURS PRN
Status: DISCONTINUED | OUTPATIENT
Start: 2020-02-29 | End: 2020-02-29 | Stop reason: HOSPADM

## 2020-02-29 RX ORDER — AZATHIOPRINE 50 MG/1
150 TABLET ORAL DAILY
Status: DISCONTINUED | OUTPATIENT
Start: 2020-02-29 | End: 2020-02-29

## 2020-02-29 RX ORDER — SODIUM CHLORIDE 9 MG/ML
INJECTION, SOLUTION INTRAVENOUS CONTINUOUS
Status: DISCONTINUED | OUTPATIENT
Start: 2020-02-29 | End: 2020-02-29

## 2020-02-29 RX ORDER — DOBUTAMINE HYDROCHLORIDE 200 MG/100ML
10-50 INJECTION INTRAVENOUS CONTINUOUS
Status: DISCONTINUED | OUTPATIENT
Start: 2020-02-29 | End: 2020-02-29

## 2020-02-29 RX ORDER — AZATHIOPRINE 50 MG/1
150 TABLET ORAL DAILY
Status: DISCONTINUED | OUTPATIENT
Start: 2020-02-29 | End: 2020-02-29 | Stop reason: HOSPADM

## 2020-02-29 RX ORDER — ALUMINA, MAGNESIA, AND SIMETHICONE 2400; 2400; 240 MG/30ML; MG/30ML; MG/30ML
30 SUSPENSION ORAL EVERY 4 HOURS PRN
Status: DISCONTINUED | OUTPATIENT
Start: 2020-02-29 | End: 2020-02-29 | Stop reason: HOSPADM

## 2020-02-29 RX ORDER — IOPAMIDOL 755 MG/ML
77 INJECTION, SOLUTION INTRAVASCULAR ONCE
Status: COMPLETED | OUTPATIENT
Start: 2020-02-29 | End: 2020-02-29

## 2020-02-29 RX ORDER — TACROLIMUS 1 MG/1
2 CAPSULE, GELATIN COATED ORAL 2 TIMES DAILY
Status: DISCONTINUED | OUTPATIENT
Start: 2020-02-29 | End: 2020-02-29 | Stop reason: HOSPADM

## 2020-02-29 RX ORDER — NITROGLYCERIN 0.4 MG/1
0.4 TABLET SUBLINGUAL EVERY 5 MIN PRN
Status: DISCONTINUED | OUTPATIENT
Start: 2020-02-29 | End: 2020-02-29

## 2020-02-29 RX ORDER — SULFAMETHOXAZOLE AND TRIMETHOPRIM 400; 80 MG/1; MG/1
1 TABLET ORAL
Status: DISCONTINUED | OUTPATIENT
Start: 2020-03-02 | End: 2020-02-29 | Stop reason: HOSPADM

## 2020-02-29 RX ORDER — ATORVASTATIN CALCIUM 10 MG/1
10 TABLET, FILM COATED ORAL DAILY
Status: DISCONTINUED | OUTPATIENT
Start: 2020-02-29 | End: 2020-02-29 | Stop reason: HOSPADM

## 2020-02-29 RX ORDER — ASPIRIN 81 MG/1
81 TABLET, CHEWABLE ORAL DAILY
Status: DISCONTINUED | OUTPATIENT
Start: 2020-02-29 | End: 2020-02-29 | Stop reason: HOSPADM

## 2020-02-29 RX ORDER — ATORVASTATIN CALCIUM 10 MG/1
10 TABLET, FILM COATED ORAL DAILY
Status: DISCONTINUED | OUTPATIENT
Start: 2020-02-29 | End: 2020-02-29

## 2020-02-29 RX ORDER — ASPIRIN 81 MG/1
162 TABLET, CHEWABLE ORAL ONCE
Status: DISCONTINUED | OUTPATIENT
Start: 2020-02-29 | End: 2020-02-29

## 2020-02-29 RX ORDER — ACETAMINOPHEN 325 MG/1
650 TABLET ORAL EVERY 4 HOURS PRN
Status: DISCONTINUED | OUTPATIENT
Start: 2020-02-29 | End: 2020-02-29 | Stop reason: HOSPADM

## 2020-02-29 RX ORDER — ASPIRIN 81 MG/1
81 TABLET ORAL DAILY
Status: DISCONTINUED | OUTPATIENT
Start: 2020-03-01 | End: 2020-02-29

## 2020-02-29 RX ORDER — METOPROLOL TARTRATE 1 MG/ML
1-20 INJECTION, SOLUTION INTRAVENOUS
Status: DISCONTINUED | OUTPATIENT
Start: 2020-02-29 | End: 2020-02-29

## 2020-02-29 RX ORDER — NALOXONE HYDROCHLORIDE 0.4 MG/ML
.1-.4 INJECTION, SOLUTION INTRAMUSCULAR; INTRAVENOUS; SUBCUTANEOUS
Status: DISCONTINUED | OUTPATIENT
Start: 2020-02-29 | End: 2020-02-29 | Stop reason: HOSPADM

## 2020-02-29 RX ORDER — ATROPINE SULFATE 0.4 MG/ML
.2-2 AMPUL (ML) INJECTION
Status: COMPLETED | OUTPATIENT
Start: 2020-02-29 | End: 2020-02-29

## 2020-02-29 RX ORDER — SODIUM BICARBONATE 650 MG/1
1300 TABLET ORAL 2 TIMES DAILY
Status: DISCONTINUED | OUTPATIENT
Start: 2020-02-29 | End: 2020-02-29 | Stop reason: HOSPADM

## 2020-02-29 RX ORDER — VENLAFAXINE HYDROCHLORIDE 75 MG/1
225 CAPSULE, EXTENDED RELEASE ORAL EVERY MORNING
Status: DISCONTINUED | OUTPATIENT
Start: 2020-02-29 | End: 2020-02-29 | Stop reason: HOSPADM

## 2020-02-29 RX ADMIN — IOPAMIDOL 77 ML: 755 INJECTION, SOLUTION INTRAVENOUS at 10:40

## 2020-02-29 RX ADMIN — TACROLIMUS 2 MG: 1 CAPSULE, GELATIN COATED ORAL at 19:54

## 2020-02-29 RX ADMIN — VENLAFAXINE HYDROCHLORIDE 225 MG: 75 CAPSULE, EXTENDED RELEASE ORAL at 09:15

## 2020-02-29 RX ADMIN — SODIUM BICARBONATE 650 MG TABLET 1300 MG: at 19:54

## 2020-02-29 RX ADMIN — PERFLUTREN 10 ML: 6.52 INJECTION, SUSPENSION INTRAVENOUS at 10:37

## 2020-02-29 RX ADMIN — SODIUM BICARBONATE 650 MG TABLET 1300 MG: at 08:37

## 2020-02-29 RX ADMIN — ATORVASTATIN CALCIUM 10 MG: 10 TABLET, FILM COATED ORAL at 19:54

## 2020-02-29 RX ADMIN — TACROLIMUS 2 MG: 1 CAPSULE, GELATIN COATED ORAL at 08:38

## 2020-02-29 RX ADMIN — ATORVASTATIN CALCIUM 10 MG: 10 TABLET, FILM COATED ORAL at 02:54

## 2020-02-29 RX ADMIN — Medication 0.2 MG: at 10:20

## 2020-02-29 RX ADMIN — SODIUM BICARBONATE 650 MG TABLET 1300 MG: at 02:54

## 2020-02-29 RX ADMIN — ENOXAPARIN SODIUM 135 MG: 150 INJECTION SUBCUTANEOUS at 12:46

## 2020-02-29 RX ADMIN — AZATHIOPRINE 150 MG: 50 TABLET ORAL at 19:54

## 2020-02-29 RX ADMIN — DOBUTAMINE HYDROCHLORIDE 10 MCG/KG/MIN: 200 INJECTION INTRAVENOUS at 10:12

## 2020-02-29 RX ADMIN — METOPROLOL TARTRATE 5 MG: 1 INJECTION, SOLUTION INTRAVENOUS at 10:30

## 2020-02-29 RX ADMIN — WARFARIN SODIUM 5 MG: 5 TABLET ORAL at 18:37

## 2020-02-29 RX ADMIN — SODIUM CHLORIDE: 9 INJECTION, SOLUTION INTRAVENOUS at 11:25

## 2020-02-29 RX ADMIN — ASPIRIN 81 MG CHEWABLE TABLET 81 MG: 81 TABLET CHEWABLE at 08:37

## 2020-02-29 RX ADMIN — NITROGLYCERIN 0.4 MG: 0.4 TABLET SUBLINGUAL at 00:39

## 2020-02-29 ASSESSMENT — ENCOUNTER SYMPTOMS
ABDOMINAL PAIN: 1
DIZZINESS: 1
UNEXPECTED WEIGHT CHANGE: 0
SHORTNESS OF BREATH: 1
DIAPHORESIS: 1
COUGH: 1
NAUSEA: 1
DIFFICULTY URINATING: 0
FEVER: 1
DYSURIA: 0
MYALGIAS: 1

## 2020-02-29 ASSESSMENT — MIFFLIN-ST. JEOR: SCORE: 1998.62

## 2020-02-29 NOTE — PLAN OF CARE
- Serial troponins and stress test complete. No. Stress test and Echo to be completed  - Seen and cleared by consultant if applicable. No  - Adequate pain control on oral analgesia. Yes. Patient denies pain.  - Vital signs normal or at patient baseline. Yes.  - Safe disposition plan has been identified. No.

## 2020-02-29 NOTE — DISCHARGE INSTRUCTIONS
You will be contacted to schedule a follow up appointment with the [facility]. If you have questions or do not hear from them in the next two weeks you can contact them below:  [facility]  [address] [place] [address]  Main: [phone], Fax: [phone]

## 2020-02-29 NOTE — DISCHARGE SUMMARY
Discharge Summary    Delilah Fountain MRN# [id number]   YOB: 1965 Age: 54 year old     Date of Admission:  2/28/2020  Date of Discharge:  2/29/2020  Admitting Physician:  Amisha Rubalcava MD  Discharge Physician:  Dr. Cerda  Discharging Service:  Emergency Medicine     Primary Provider: Alabn Clark          Discharge Diagnosis:   Acute bilateral pulmonary emboli             Discharge Disposition:   Discharged to home           Condition on Discharge:   Discharge condition: Stable   Code status on discharge: Full Code           Procedures:   No procedures performed during this admission          Discharge Medications:     Current Discharge Medication List      START taking these medications    Details   enoxaparin ANTICOAGULANT (LOVENOX) 150 MG/ML syringe Inject 0.9 mLs (135 mg) Subcutaneous every 12 hours for 7 days  Qty: 14 mL, Refills: 0    Associated Diagnoses: Other acute pulmonary embolism without acute cor pulmonale (H)      warfarin ANTICOAGULANT (COUMADIN) 5 MG tablet Take 1 tablet (5 mg) by mouth daily  Qty: 20 tablet, Refills: 0    Associated Diagnoses: Other acute pulmonary embolism without acute cor pulmonale (H)         CONTINUE these medications which have NOT CHANGED    Details   azaTHIOprine (IMURAN) 50 MG tablet Take 3 tablets (150 mg) by mouth daily  Qty: 90 tablet, Refills: 0    Comments: Transplant Date: 7/12/2000 (Kidney / Pancreas) Discharge Date: 9/21/2000 ICD10: Kidney replaced by transplant - Z94.0 Location of Transplant: [facility] ([place], [place])  Associated Diagnoses: Kidney transplanted; Pancreas replaced by transplant (H)      PROGRAF (BRAND) 1 MG capsule Take 2 capsules (2 mg) by mouth 2 times daily  Qty: 120 capsule, Refills: 11    Associated Diagnoses: Kidney transplanted      sulfamethoxazole-trimethoprim (BACTRIM/SEPTRA) 400-80 MG per tablet Take 1 tablet by mouth Every Mon, Wed, Fri Morning  Qty: 40 tablet, Refills: 0     Comments: Labs needed  Associated Diagnoses: Pancreas replaced by transplant (H)      venlafaxine (EFFEXOR) 75 MG tablet Take 150 mg by mouth every morning   Qty: 1 tablet, Refills: 0      atorvastatin (LIPITOR) 10 MG tablet Take 1 tablet (10 mg) by mouth daily  Qty: 90 tablet, Refills: 3    Associated Diagnoses: Hyperlipidemia, unspecified hyperlipidemia type      calcium carbonate (OS-RENATO 500 MG [place]. [place]) 500 MG tablet Take 500 mg by mouth 2 times daily      Cetirizine HCl (ZYRTEC ALLERGY PO) Take 5 mg by mouth 2 times daily      cholecalciferol (VITAMIN D3) 1000 units (25 mcg) capsule Take 1 capsule by mouth daily.  Qty: 30 capsule, Refills: 11    Associated Diagnoses: Kidney replaced by transplant      order for DME AIRSENSE 10  10-15CM/H20  PILLOWS CHRISTIANSON FX FOR HER      sodium bicarbonate 650 MG tablet Take 2 tablets (1,300 mg) by mouth 2 times daily  Qty: 360 tablet, Refills: 3    Associated Diagnoses: Kidney replaced by transplant         STOP taking these medications       ASPIRIN PO Comments:   Reason for Stopping:                     Consultations:   Consultation during this admission received from hematology             Brief History of Illness:   Please see detailed H&P from 2/29/2020, in brief: Delilah Fountain is a 54 year old female with a history of type I diabetes mellitus s/p kidney and pancreas transplant (2000), HTN, and sleep apnea who presents to the ED for evaluation of dyspnea with exertion             Hospital Course:   1. acute extensive bilateral pulmonary emboli: worsening dyspnea on exertion since 2/26/2020, she has been quite sedentary in her apartment due to the cold weather and some depression.  Has history of provoked DVT in the past and has an IVC filter in place. Discussed obtaining chest CT with contrast for PE with renal transplant and they are ok with the contrast dye since her creatinine and GFR are WNL, recommended hydrating with fluids.  She underwent a chest CT and  "this showed:   Exam is positive for acute pulmonary embolism. There is thrombus in the distal right and left  pulmonary arteries, with extension into the right upper, right middle, right lower, left lower lobar arteries. There is also segmental and subsegmental pulmonary emboli, including in the left lower lobe.  Hematology consulted and recommended initiation of lovenox and coumadin with INR goal 2-3.  She felt improved with her breathing and had O2 saturations 94-95% on room air in the evening.  Eating well and ambulating.  She was discharged to home.  Recommended she follow up with her PCP on Monday for INR.  Continue Lovenox and coumadin until INR>2.0.     While in the observation unit the patient's vital signs remained stable, afebrile.  Serial troponins negative X 3, no ectopy on telemetry.  The patient underwent a stress test and this was normal.  Her dyspnea is likely secondary to pulmonary emboli.  Hematology would like to see the patient in their clinic in 3 months.  Patient given their number for questions.          2. Hx of renal/pancreatic transplant in 2000 due to ESRD from DMI  - cont immunosuppression with imuran and tacrolimus  - cont PCP prophy with bactrim     3. CV  - cont lipitor  -stop aspirin            Final Day of Progress before Discharge:       Physical Exam:  Blood pressure 119/83, pulse 89, temperature 98.3  F (36.8  C), temperature source Oral, resp. rate 16, height 1.74 m (5' 8.5\"), weight 134.7 kg (297 lb), last menstrual period 11/06/2015, SpO2 93 %, not currently breastfeeding.    EXAM:  Exam:  Constitutional: healthy, alert and no distress  Cardiovascular: No lifts, heaves, or thrills. RRR. No murmurs, clicks gallops or rub  Respiratory: Good diaphragmatic excursion. Lungs clear  Gastrointestinal: Abdomen soft, non-tender. BS normal. No masses, organomegaly  Musculoskeletal: extremities normal- no gross deformities noted, gait normal and normal muscle tone  Skin: no suspicious " "lesions or rashes, +1 bilateral LE edema.  Neurologic: Gait normal. Alert and oriented.   Psychiatric: mentation appears normal and affect normal/bright    /83 (BP Location: Right arm)   Pulse 89   Temp 98.3  F (36.8  C) (Oral)   Resp 16   Ht 1.74 m (5' 8.5\")   Wt 134.7 kg (297 lb)   LMP 11/06/2015   SpO2 93%   Breastfeeding No   BMI 44.50 kg/m               Data:  All laboratory data reviewed             Significant Results:     Results for orders placed or performed during the hospital encounter of 02/28/20   XR Chest 2 Views     Status: None    Narrative    Exam: XR CHEST 2 VW, 2/28/2020 8:24 PM    Indication: SOB    Comparison: 3/18/2013.    Findings:   PA and lateral views of the chest. Lung volumes are normal. Cardiac  silhouette is mildly enlarged. Prominent interstitial lung markings  without focal airspace disease. No pleural effusion or pneumothorax.  Visualized upper abdomen is unremarkable. Partially visualized IVC  filter. Degenerative changes of thoracic spine.      Impression    Impression: Mild cardiomegaly with prominent interstitial lung  markings which may represent mild interstitial pulmonary edema. No  focal pneumonia.    I have personally reviewed the examination and initial interpretation  and I agree with the findings.    YAMILKA OLIVA MD   CT Chest Pulmonary Embolism w Contrast     Status: Abnormal   Result Value Ref Range    Radiologist flags Extensive pulmonary embolism (Urgent)     Narrative    EXAMINATION: CTA pulmonary angiogram, 2/29/2020 10:49 AM     COMPARISON: None.  Chest radiograph 2/28/2020 was available for  correlation    HISTORY: P suspected, intermediate probability, positive d-dimer.    Additional history per chart:  54 year old female?with a history of  type I diabetes mellitus s/p kidney and pancreas transplant (2000),  HTN, and sleep apnea?who?presents to the ED for evaluation of  increased?shortness of breath.     TECHNIQUE: Volumetric helical " acquisition of CT images of the chest  from the lung apices to the kidneys were acquired after the  administration of 77 mL of Isovue-370 IV contrast. Flash technique  with free breathing acquisition.  Post-processed multiplanar and/or  MIP reformations were obtained, archived to PACS and used in  interpretation of this study.     FINDINGS:  Contrast bolus is: adequate.  Exam is positive for acute  pulmonary embolism. There is thrombus in the distal right and left  pulmonary arteries, with extension into the right upper, right middle,  right lower, left lower lobar arteries. There is also segmental and  subsegmental pulmonary emboli, including in the left lower lobe.    The largest right ventricle transaxial diameter is (measured from  endocardium to endocardium): 3.7 cm   The largest left ventricle transaxial diameter is (measured from  endocardium to endocardium): 4.2 cm  RV/LV ratio is: 0.88 (if ratio greater than 1.1 then sign is  suspicious for right heart strain)  Reflux of contrast into the IVC? no  Paradoxical bowing of the interventricular septum to the left? no  Pericardial effusion?:not present    The heart size is within normal limits. Mild scattered coronary artery  calcifications. No enlarged thoracic lymph nodes.    The central tracheobronchial tree is patent. No pleural effusion,  pneumothorax, or significant pulmonary consolidation. Small fissural  lymph node along the minor fissure. No suspicious pulmonary nodule or  mass.    Evaluation of the upper abdomen is limited. Atrophic native kidneys  are partially visualized. Partially visualized IVC filter.    Bones: Degenerative changes of the spine.      Impression    IMPRESSION:   1.  Exam is positive for acute pulmonary embolism. Extensive pulmonary  embolism involving both the right and left pulmonary artery distally,  extending into the majority of the lobar pulmonary arteries with the  exception of the left lower lobe pulmonary artery. Extensive  segmental  subsegmental pulmonary embolism throughout both lungs.      2.  Evidence for right heart strain or increased right heart  pressures?  is not present.     3.  Atrophic native kidneys are partially visualized. Partially  visualized IVC filter.    [Urgent Result: Extensive pulmonary embolism]    Finding was identified on 2/29/2020 10:50 AM.     Herminia ARMAS was contacted by Dr. Gallagher at 2/29/2020 11:02 AM and  verbalized understanding of the urgent finding.     In the event of a positive result for acute pulmonary embolism  resulting in right heart strain, consider calling the   [facility] hospital  for PERT (Pulmonary Embolism Response Team)  Activation?    PERT -- Pulmonary Embolism Response Team (Multidisciplinary team  including cardiology, interventional radiology, critical care,  hematology)    I have personally reviewed the examination and initial interpretation  and I agree with the findings.    CHANDA GARRISON MD   CBC with platelets differential     Status: None   Result Value Ref Range    WBC 9.5 4.0 - 11.0 10e9/L    RBC Count 4.68 3.8 - 5.2 10e12/L    Hemoglobin 14.5 11.7 - 15.7 g/dL    Hematocrit 44.2 35.0 - 47.0 %    MCV 94 78 - 100 fl    MCH 31.0 26.5 - 33.0 pg    MCHC 32.8 31.5 - 36.5 g/dL    RDW 13.8 10.0 - 15.0 %    Platelet Count 240 150 - 450 10e9/L    Diff Method Automated Method     % Neutrophils 79.1 %    % Lymphocytes 10.4 %    % Monocytes 8.1 %    % Eosinophils 1.6 %    % Basophils 0.2 %    % Immature Granulocytes 0.6 %    Nucleated RBCs 0 0 /100    Absolute Neutrophil 7.5 1.6 - 8.3 10e9/L    Absolute Lymphocytes 1.0 0.8 - 5.3 10e9/L    Absolute Monocytes 0.8 0.0 - 1.3 10e9/L    Absolute Eosinophils 0.2 0.0 - 0.7 10e9/L    Absolute Basophils 0.0 0.0 - 0.2 10e9/L    Abs Immature Granulocytes 0.1 0 - 0.4 10e9/L    Absolute Nucleated RBC 0.0    Comprehensive metabolic panel     Status: Abnormal   Result Value Ref Range    Sodium 140 133 - 144 mmol/L    Potassium 4.1 3.4 - 5.3  mmol/L    Chloride 110 (H) 94 - 109 mmol/L    Carbon Dioxide 26 20 - 32 mmol/L    Anion Gap 4 3 - 14 mmol/L    Glucose 109 (H) 70 - 99 mg/dL    Urea Nitrogen 13 7 - 30 mg/dL    Creatinine 0.70 0.52 - 1.04 mg/dL    GFR Estimate >90 >60 mL/min/[1.73_m2]    GFR Estimate If Black >90 >60 mL/min/[1.73_m2]    Calcium 8.9 8.5 - 10.1 mg/dL    Bilirubin Total 0.4 0.2 - 1.3 mg/dL    Albumin 3.0 (L) 3.4 - 5.0 g/dL    Protein Total 7.6 6.8 - 8.8 g/dL    Alkaline Phosphatase 83 40 - 150 U/L    ALT 17 0 - 50 U/L    AST 21 0 - 45 U/L   Troponin I     Status: None   Result Value Ref Range    Troponin I ES <0.015 0.000 - 0.045 ug/L   Nt probnp inpatient     Status: None   Result Value Ref Range    N-Terminal Pro BNP Inpatient 113 0 - 900 pg/mL   UA with Microscopic     Status: Abnormal   Result Value Ref Range    Color Urine Yellow     Appearance Urine Clear     Glucose Urine Negative NEG^Negative mg/dL    Bilirubin Urine Negative NEG^Negative    Ketones Urine Negative NEG^Negative mg/dL    Specific Gravity Urine 1.016 1.003 - 1.035    Blood Urine Small (A) NEG^Negative    pH Urine 6.5 5.0 - 7.0 pH    Protein Albumin Urine Negative NEG^Negative mg/dL    Urobilinogen mg/dL Normal 0.0 - 2.0 mg/dL    Nitrite Urine Negative NEG^Negative    Leukocyte Esterase Urine Negative NEG^Negative    Source Urine     WBC Urine 1 0 - 5 /HPF    RBC Urine 5 (H) 0 - 2 /HPF    Squamous Epithelial /HPF Urine 5 (H) 0 - 1 /HPF    Amorphous Crystals Few (A) NEG^Negative /HPF   D dimer quantitative     Status: Abnormal   Result Value Ref Range    D Dimer 6.0 (H) 0.0 - 0.50 ug/ml FEU   Lipase     Status: Abnormal   Result Value Ref Range    Lipase 57 (L) 73 - 393 U/L   Troponin I     Status: None   Result Value Ref Range    Troponin I ES <0.015 0.000 - 0.045 ug/L   Amylase     Status: None   Result Value Ref Range    Amylase 43 30 - 110 U/L   EKG 12-lead, tracing only     Status: None (Preliminary result)   Result Value Ref Range    Interpretation ECG Click  View Image link to view waveform and result    Hematology Adult IP Consult: Patient to be seen: Routine within 24 hrs; Call back #: [id number]; multiple PEs with IVC filter in place, not on anti coagulation; Consultant may enter orders: Yes     Status: None ()    Narrative    Adela Roberts MD     2/29/2020  5:26 PM                                   HEMATOLOGY CONSULT NOTE    Subjective     REFERRAL SOURCE  ED Obs    CONSULT INDICATION:  Multiple PE's with IVC filter in place, not on anticoagulation    HISTORY OF PRESENT ILLNESS:  Ms. Delilah Fountain is a 54 year old female with history of   T1DM s/p kidney and pancreas transplant in 2000, provoked DVT   following transplant s/p IVC filter placement in 2000, HTN, HERLINDA,   and morbid obesity who was admitted with increasing shortness of   breath and was found to have bilateral acute pulmonary emboli.     Patient has history of a provoked RLE DVT following her   kidney/pancreas transplant surgery in 2000. She had an IVC filter   placed but was eventually able to complete a short course of   Lovenox. The IVC filter appears to have tip embedded in the   vessel wall since at least 2013 and thus has not been removed.   She denies any recurrent clots until now.    Over the past month, she had noticed worsening dyspnea on   exertion but initially thought it was just deconditioning. Also   has occasional lightheadedness with standing. Has lower extremity   swelling on and off but no pain. Otherwise no fever/chills, chest   pain, N/V, abdominal pain, or bleeding. She denies any recent   surgeries, hospitalizations, or travel. She does endorse being   less active lately. Denies smoking. Is going through   megan-menopause but not on any estrogen products.       Past Medical History:   Diagnosis Date     Allergic rhinitis      Anxiety      Gastro-oesophageal reflux disease      Sleep apnea     uses cpap machine        History reviewed. No pertinent family history.   Father also has  history of blood clots.     Social History     Socioeconomic History     Marital status: Single     Spouse name: Not on file     Number of children: Not on file     Years of education: Not on file     Highest education level: Not on file   Occupational History     Not on file   Social Needs     Financial resource strain: Not on file     Food insecurity:     Worry: Not on file     Inability: Not on file     Transportation needs:     Medical: Not on file     Non-medical: Not on file   Tobacco Use     Smoking status: Never Smoker     Smokeless tobacco: Never Used   Substance and Sexual Activity     Alcohol use: Yes     Comment: rarely     Drug use: No     Sexual activity: Not on file   Lifestyle     Physical activity:     Days per week: Not on file     Minutes per session: Not on file     Stress: Not on file   Relationships     Social connections:     Talks on phone: Not on file     Gets together: Not on file     Attends Taoist service: Not on file     Active member of club or organization: Not on file     Attends meetings of clubs or organizations: Not on file     Relationship status: Not on file     Intimate partner violence:     Fear of current or ex partner: Not on file     Emotionally abused: Not on file     Physically abused: Not on file     Forced sexual activity: Not on file   Other Topics Concern     Parent/sibling w/ CABG, MI or angioplasty before 65F 55M? Not   Asked   Social History Narrative     Not on file           Allergies   Allergen Reactions     Wellbutrin [Bupropion] Other (See Comments)     Pt. Reports that it made her more weird     Medications Prior to Admission   Medication Sig Dispense Refill Last Dose     ASPIRIN PO Take 81 mg by mouth daily.   Past Month     azaTHIOprine (IMURAN) 50 MG tablet Take 3 tablets (150 mg) by   mouth daily 90 tablet 0 2/28/2020     PROGRAF (BRAND) 1 MG capsule Take 2 capsules (2 mg) by mouth 2   times daily 120 capsule 11 2/29/2020      "sulfamethoxazole-trimethoprim (BACTRIM/SEPTRA) 400-80 MG per   tablet Take 1 tablet by mouth Every Mon, Wed, Fri Morning 40   tablet 0 2/29/2020     venlafaxine (EFFEXOR) 75 MG tablet Take 150 mg by mouth every   morning  1 tablet 0 2/29/2020     atorvastatin (LIPITOR) 10 MG tablet Take 1 tablet (10 mg) by   mouth daily 90 tablet 3      calcium carbonate (OS-RENATO 500 MG [place]. [place]) 500 MG tablet Take   500 mg by mouth 2 times daily   Taking     Cetirizine HCl (ZYRTEC ALLERGY PO) Take 5 mg by mouth 2 times   daily   Taking     cholecalciferol (VITAMIN D3) 1000 units (25 mcg) capsule Take 1   capsule by mouth daily. 30 capsule 11      order for DME AIRSENSE 10  10-15CM/H20  PILLOWS CHRISTIANSON FX FOR HER   Taking     sodium bicarbonate 650 MG tablet Take 2 tablets (1,300 mg) by   mouth 2 times daily 360 tablet 3 Taking       REVIEW OF SYSTEMS:  Reviewed and negative other than that mentioned in HPI.     Objective     VITAL SIGNS:  BP (!) 138/91 (BP Location: Right arm)   Pulse 85   Temp 98.5    F (36.9  C) (Oral)   Resp 16   Ht 1.74 m (5' 8.5\")   Wt 134.7   kg (297 lb)   LMP 11/06/2015   SpO2 93%   Breastfeeding No     BMI 44.50 kg/m       PHYSICAL EXAM:  Vitals reviewed.  General: Awake, alert, in no acute distress.   ENT: Normocephalic, atraumatic. Mucous membranes moist.   CV: Regular rate and rhythm. No murmurs, rubs, or gallops   appreciated.  Resp: Good inspiratory effort, lungs clear to auscultation   bilaterally.  Abd: Obese. Nondistended.  Ext: 1+ pitting edema bilaterally.  Neuro: CN II-XII grossly intact. Moves all extremities   spontaneously.   Skin: No rash, unusual bruising or prominent lesions.    LABS:  Lab Results   Component Value Date    WBC 9.5 02/28/2020    HGB 14.5 02/28/2020    HCT 44.2 02/28/2020    MCV 94 02/28/2020     02/28/2020     Lab Results   Component Value Date     02/28/2020    BUN 13 02/28/2020    ANIONGAP 4 02/28/2020     Lab Results   Component Value Date    ALT 17 " 2020    AST 21 2020    ALKPHOS 83 2020     Lab Results   Component Value Date    INR 1.08 2013       IMAGIN20 CTA Chest:  1.  Exam is positive for acute pulmonary embolism. Extensive   pulmonary embolism involving both the right and left pulmonary   artery distally, extending into the majority of the lobar   pulmonary arteries with the exception of the left lower lobe   pulmonary artery. Extensive segmental subsegmental pulmonary   embolism throughout both lungs.    2.  Evidence for right heart strain or increased right heart  pressures?  is not present.   3.  Atrophic native kidneys are partially visualized. Partially  visualized IVC filter.    Assessment & Plan   #1 Acute extensive bilateral pulmonary emboli  #2 History of provoked RLE DVT s/p IVC filter placement in     Pleasant 54-yo female who presented with worsening dyspnea over   the last month and was found to have acute extensive PE   bilaterally despite having an IVC filter placed in . However,   the filter appears to have embedded in the vessel wall and may   actually be increasing her thrombogenic risk. Would consider this   event unprovoked vs mildly provoked by non-modifiable risk   factors (decreased mobility, morbid obesity, IVC filter).   Therefore would recommend indefinite anticoagulation either way.   Will also check antiphospholipid antibodies to rule out APS.     In terms of anticoagulation, warfarin would be preferred over   DOACs due to her obesity and possible drug interactions. We   discussed the logistics of warfarin with her, although she should   still get formal warfarin education.     Recommendations:  1. Start Lovenox 1 mg/kg BID and bridge to warfarin until INR   >2.0. If discharging, will need to f/u with her PCP to determine   when she can stop Lovenox.  2. Added on lupus anticoagulant, anticardiolipin Ab, and beta-2   glycoprotein Ab.   3. Would ensure that patient is up-to-date on age  appropriate   cancer screening as outpatient.   4. Will plan to see patient back at [facility] in ~3 months to review long-term   anticoagulation plan and recheck APS labs if needed. If any   questions come up in the meantime, she can contact the [facility]   (contact info placed in discharge AVS).     Thank you for this consult. This patient was seen and discussed   with Dr. Bustamante.    Adela Roberts MD   Hematology-Oncology Fellow, PGY-4   Echo Dobutamine Stress     Status: None    Narrative    [id number]  [facility]  [id number]  [id number]^[email or web address]^VIVIANA^MAYCOL           [facility],[place]  Echocardiography Laboratory  [address] [place] [address]     Name: HARI BROWNLEE  MRN: [id number]  : 1965  Study Date: 2020 09:50 AM  Age: 54 yrs  Gender: Female  Patient Location: [facility]  Reason For Study: [facility]  Ordering Physician: VIVIANA HARDY  Referring Physician: VIVIANA HARDY  Performed By: Anne-Marie Gutiérrez     BSA: 2.4 m2  Height: 68 in  Weight: 297 lb  _____________________________________________________________________________  __     _____________________________________________________________________________  __        Interpretation Summary  Normal, low-risk dobutamine echocardiogram without evidence of ischemia.  The target heart rate was achieved.  Normal biventricular size, thickness, and hyperdynamic global systolic  function at baseline, LVEF=65-70%.  With low-dose dobutamine, LVEF augmented and LV cavity size decreased  appropriately.  With peak dobutamine, LVEF increased further to >70% and LV cavity size  decreased appropriately.  No regional wall motion abnormalities at rest or with dobutamine.  No angina was elicited.  ECG was equivocal for evidence of ischemia with 1cm, upsloping ST depressions  in the inferolateral leads. Normal heart rate and blood pressure response to  dobutamine.  No significant valvular abnormalities are noted  on screening Doppler exam.  The aortic root and visualized ascending aorta are normal.  _____________________________________________________________________________  __     Stress  The drug infusion was stopped due to target heart rate achieved.  The maximum dose of dobutamine was 30mcg/kg/min.  The maximum dose of atropine was 0.2mg.  The maximum dose of metoprolol was 5.0mg.  Definity (NDC #83070-013-01) given intravenously.  Patient was given 10ml mixture of 1.5ml Definity and 8.5ml saline.  Definity Expiration 11/01/20 .  Definity Lot # [id number] .     Stress Results                                       Maximum Predicted HR:   165 bpm             Target HR: 140 bpm        % Maximum Predicted HR: 88 %                             Stage  DurationHeart Rate  BP                                 (mm:ss)   (bpm)                         Baseline            80    149/84                           Peak    9:38     146    152/54                             Stress Duration:   9:38 mm:ss                       Maximum Stress HR: 146 bpm     Right Ventricle  The right ventricle is normal in size and function.     Tricuspid Valve  The tricuspid valve is not well visualized.        Aortic Valve  The aortic valve is normal in structure and function.     Vessels  Normal size aorta.     Procedure  Dobutamine stress echo with two dimensional color and spectral Doppler  performed.  _____________________________________________________________________________  __     MMode/2D Measurements & Calculations  asc Aorta Diam: 3.1 cm        Doppler Measurements & Calculations  MV E max marjorie: 74.6 cm/sec  MV A max marjorie: 84.5 cm/sec  MV E/A: 0.88  Lateral E/e': 9.8              _____________________________________________________________________________  __        Report approved by: Michael Morrison 02/29/2020 11:27 AM         Recent Results (from the past 48 hour(s))   XR Chest 2 Views    Narrative    Exam: XR CHEST 2 VW, 2/28/2020 8:24  PM    Indication: SOB    Comparison: 3/18/2013.    Findings:   PA and lateral views of the chest. Lung volumes are normal. Cardiac  silhouette is mildly enlarged. Prominent interstitial lung markings  without focal airspace disease. No pleural effusion or pneumothorax.  Visualized upper abdomen is unremarkable. Partially visualized IVC  filter. Degenerative changes of thoracic spine.      Impression    Impression: Mild cardiomegaly with prominent interstitial lung  markings which may represent mild interstitial pulmonary edema. No  focal pneumonia.    I have personally reviewed the examination and initial interpretation  and I agree with the findings.    YAMILKA OLIVA MD   Echo Dobutamine Stress    Narrative    [id number]  [facility]  [id number]  [id number]^HAYLEY^VIVIANA^MAYCOL           [facility],[place]  Echocardiography Laboratory  [address] [place] [address]     Name: HARI BROWNLEE  MRN: [id number]  : 1965  Study Date: 2020 09:50 AM  Age: 54 yrs  Gender: Female  Patient Location: [facility]  Reason For Study: MIRELES  Ordering Physician: VIVIANA HARDY  Referring Physician: VIVIANA HARDY  Performed By: Anne-Marie Gutiérrez     BSA: 2.4 m2  Height: 68 in  Weight: 297 lb  _____________________________________________________________________________  __     _____________________________________________________________________________  __        Interpretation Summary  Normal, low-risk dobutamine echocardiogram without evidence of ischemia.  The target heart rate was achieved.  Normal biventricular size, thickness, and hyperdynamic global systolic  function at baseline, LVEF=65-70%.  With low-dose dobutamine, LVEF augmented and LV cavity size decreased  appropriately.  With peak dobutamine, LVEF increased further to >70% and LV cavity size  decreased appropriately.  No regional wall motion abnormalities at rest or with dobutamine.  No angina was elicited.  ECG was equivocal  for evidence of ischemia with 1cm, upsloping ST depressions  in the inferolateral leads. Normal heart rate and blood pressure response to  dobutamine.  No significant valvular abnormalities are noted on screening Doppler exam.  The aortic root and visualized ascending aorta are normal.  _____________________________________________________________________________  __     Stress  The drug infusion was stopped due to target heart rate achieved.  The maximum dose of dobutamine was 30mcg/kg/min.  The maximum dose of atropine was 0.2mg.  The maximum dose of metoprolol was 5.0mg.  Definity (NDC #32773-139-14) given intravenously.  Patient was given 10ml mixture of 1.5ml Definity and 8.5ml saline.  Definity Expiration 11/01/20 .  Definity Lot # [id number] .     Stress Results                                       Maximum Predicted HR:   165 bpm             Target HR: 140 bpm        % Maximum Predicted HR: 88 %                             Stage  DurationHeart Rate  BP                                 (mm:ss)   (bpm)                         Baseline            80    149/84                           Peak    9:38     146    152/54                             Stress Duration:   9:38 mm:ss                       Maximum Stress HR: 146 bpm     Right Ventricle  The right ventricle is normal in size and function.     Tricuspid Valve  The tricuspid valve is not well visualized.        Aortic Valve  The aortic valve is normal in structure and function.     Vessels  Normal size aorta.     Procedure  Dobutamine stress echo with two dimensional color and spectral Doppler  performed.  _____________________________________________________________________________  __     MMode/2D Measurements & Calculations  asc Aorta Diam: 3.1 cm        Doppler Measurements & Calculations  MV E max marjorie: 74.6 cm/sec  MV A max marjorie: 84.5 cm/sec  MV E/A: 0.88  Lateral E/e': 9.8               _____________________________________________________________________________  __        Report approved by: Michael Morrison 02/29/2020 11:27 AM      CT Chest Pulmonary Embolism w Contrast   Result Value    Radiologist flags Extensive pulmonary embolism (Urgent)    Narrative    EXAMINATION: CTA pulmonary angiogram, 2/29/2020 10:49 AM     COMPARISON: None.  Chest radiograph 2/28/2020 was available for  correlation    HISTORY: P suspected, intermediate probability, positive d-dimer.    Additional history per chart:  54 year old female?with a history of  type I diabetes mellitus s/p kidney and pancreas transplant (2000),  HTN, and sleep apnea?who?presents to the ED for evaluation of  increased?shortness of breath.     TECHNIQUE: Volumetric helical acquisition of CT images of the chest  from the lung apices to the kidneys were acquired after the  administration of 77 mL of Isovue-370 IV contrast. Flash technique  with free breathing acquisition.  Post-processed multiplanar and/or  MIP reformations were obtained, archived to PACS and used in  interpretation of this study.     FINDINGS:  Contrast bolus is: adequate.  Exam is positive for acute  pulmonary embolism. There is thrombus in the distal right and left  pulmonary arteries, with extension into the right upper, right middle,  right lower, left lower lobar arteries. There is also segmental and  subsegmental pulmonary emboli, including in the left lower lobe.    The largest right ventricle transaxial diameter is (measured from  endocardium to endocardium): 3.7 cm   The largest left ventricle transaxial diameter is (measured from  endocardium to endocardium): 4.2 cm  RV/LV ratio is: 0.88 (if ratio greater than 1.1 then sign is  suspicious for right heart strain)  Reflux of contrast into the IVC? no  Paradoxical bowing of the interventricular septum to the left? no  Pericardial effusion?:not present    The heart size is within normal limits. Mild scattered  coronary artery  calcifications. No enlarged thoracic lymph nodes.    The central tracheobronchial tree is patent. No pleural effusion,  pneumothorax, or significant pulmonary consolidation. Small fissural  lymph node along the minor fissure. No suspicious pulmonary nodule or  mass.    Evaluation of the upper abdomen is limited. Atrophic native kidneys  are partially visualized. Partially visualized IVC filter.    Bones: Degenerative changes of the spine.      Impression    IMPRESSION:   1.  Exam is positive for acute pulmonary embolism. Extensive pulmonary  embolism involving both the right and left pulmonary artery distally,  extending into the majority of the lobar pulmonary arteries with the  exception of the left lower lobe pulmonary artery. Extensive segmental  subsegmental pulmonary embolism throughout both lungs.      2.  Evidence for right heart strain or increased right heart  pressures?  is not present.     3.  Atrophic native kidneys are partially visualized. Partially  visualized IVC filter.    [Urgent Result: Extensive pulmonary embolism]    Finding was identified on 2/29/2020 10:50 AM.     Herminia Rangel PAWEL was contacted by Dr. Gallagher at 2/29/2020 11:02 AM and  verbalized understanding of the urgent finding.     In the event of a positive result for acute pulmonary embolism  resulting in right heart strain, consider calling the   [facility] hospital  for PERT (Pulmonary Embolism Response Team)  Activation?    PERT -- Pulmonary Embolism Response Team (Multidisciplinary team  including cardiology, interventional radiology, critical care,  hematology)    I have personally reviewed the examination and initial interpretation  and I agree with the findings.    CHANDA GARRISON MD                Pending Results:   Unresulted Labs Ordered in the Past 30 Days of this Admission     Date and Time Order Name Status Description    2/29/2020 1347 Beta 2 Glycoprotein Antibodies IGG IGM In process     2/29/2020 1343  Cardiolipin Savi IgG and IgM In process     2/29/2020 1348 Lupus Anticoagulant Panel In process                   Discharge Instructions and Follow-Up:     Discharge Procedure Orders   Reason for your hospital stay   Order Comments: You were admitted to the observation unit for evaluation of your shortness of breath with exertion.  You had no EKG changes, labs checking for heart damage were negative.  You underwent a stress test and this was normal.  CT chest showed blood clots in both lungs.  Hematology was consulted and recommended starting Lovenox and coumadin.  Please take your lovenox as scheduled (every 12 hours) and take 5 mg coumadin each night at 6pm.  Have your INR (lab draw) checked on Monday through your PCP.  If it is greater than 2.0, you can stop your lovenox and continue coumadin, but coumadin dose will need to be adjusted by your PCP and your INR monitored frequently.     Adult [facility] Follow-up and recommended labs and tests   Order Comments: Follow up with primary care provider, Alban Clark, within 3 days for hospital follow- up.  The following labs/tests are recommended: INR.      Appointments on [place] and/or [facility] (with [facility] or [facility] provider or service). Call [phone] if you haven't heard regarding these appointments within 7 days of discharge.     Activity   Order Comments: Your activity upon discharge: activity as tolerated     Order Specific Question Answer Comments   Is discharge order? Yes      When to contact your care team   Order Comments: Return to the ER if you have new or worsening chest pain, shortness of breath, jaw or arm pain, or fainting.     Diet   Order Comments: Follow this diet upon discharge: Orders Placed This Encounter      Regular Diet Adult     Order Specific Question Answer Comments   Is discharge order? Yes           Attestation:  PAWEL Hartley CNP.

## 2020-02-29 NOTE — PLAN OF CARE
- Serial troponins and stress test complete. Yes. Echo and CT completed  - Seen and cleared by consultant if applicable. No, hematology to consult  - Adequate pain control on oral analgesia. Yes. Patient denies pain.  - Vital signs normal or at patient baseline. Yes.  - Safe disposition plan has been identified. No.

## 2020-02-29 NOTE — PLAN OF CARE
- Serial troponins and stress test complete. No. Troponins negative. Patient will have stress test in am  - Seen and cleared by consultant if applicable. No  - Adequate pain control on oral analgesia. Yes. Patient denies pain.  - Vital signs normal or at patient baseline. Yes.  - Safe disposition plan has been identified. No.

## 2020-02-29 NOTE — ED PROVIDER NOTES
[place] EMERGENCY DEPARTMENT ([facility])  2020    History     Chief Complaint   Patient presents with     Shortness of Breath     Dizziness     Palpitations     HPI  Delilah Fountain is a 54 year old female with a history of type I diabetes mellitus s/p kidney and pancreas transplant (), HTN, and sleep apnea who presents to the ED for evaluation of increased shortness of breath. Patient reports she has felt short of breath while walking short distances as well as walking up stairs since 19. She states her shortness of breath is not worse while lying down. She reports she feels chest discomfort when she is short of breath. She specifically denies chest pain. Patient states she has a little swelling in her legs, but notes this is normal for her since her transplant surgery. Patient denies previous history of heart failure or heart disease. She reports she had a DVT in her right leg after her transplant surgery. She denies previous history of PE. Patient denies decreased urine output, difficulty urinating, or dysuria. She states she had a stress test done 5-6 years ago, and it was normal. She notes her maternal grandfather  of heart disease in his 80's. Patient denies anticoagulants. She reports she is compliant with her anti-rejection medications. She states she has sleep apnea and uses a CPAP machine at night. She reports her blood pressure has not been higher than normal. She is a non-smoker. Patient denies estrogen supplements.    Of note, patient reports her Effexor was recently increased to 150 mg BID. She states she has been off and on Effexor over the past month, because she was having difficulty getting her prescription refilled. She is concerned that her shortness of breath is associated with withdrawal symptoms she has been experiencing from the Effexor. She reports her withdrawal symptoms consist of dizziness, sweats, fevers, and body aches. She also states she  "has intermittent nausea and abdominal pain. She reports she has a dry cough, but states this is a side effect from one of her medications.     PAST MEDICAL HISTORY  Past Medical History:   Diagnosis Date     Allergic rhinitis      Anxiety      Gastro-oesophageal reflux disease      Sleep apnea     uses cpap machine     PAST SURGICAL HISTORY  Past Surgical History:   Procedure Laterality Date     EYE SURGERY      left and right cataract surgeries     HERNIA REPAIR      ventral     HERNIORRHAPHY INCISIONAL (LOCATION)  3/21/2013    Procedure: HERNIORRHAPHY INCISIONAL (LOCATION);  Open Incisional Hernia  Anesthesia General with block;  Surgeon: Rhonda Pandey MD;  Location: [facility] OR     TRANSPLANT  2000    kidney/pancreas     FAMILY HISTORY  History reviewed. No pertinent family history.  SOCIAL HISTORY  Social History     Tobacco Use     Smoking status: Never Smoker     Smokeless tobacco: Never Used   Substance Use Topics     Alcohol use: Yes     Comment: rarely     MEDICATIONS  Current Facility-Administered Medications   Medication     nitroGLYcerin (NITROSTAT) sublingual tablet 0.4 mg     ALLERGIES  Allergies   Allergen Reactions     Wellbutrin [Bupropion] Other (See Comments)     Pt. Reports that it made her more weird       I have reviewed the Medications, Allergies, Past Medical and Surgical History, and Social History in the Epic system.    Review of Systems   Constitutional: Positive for diaphoresis and fever (subjective). Negative for unexpected weight change (gained 8 lbs in past year, but nothng recent).   Respiratory: Positive for cough (\"side effect of medication\") and shortness of breath.    Cardiovascular: Positive for leg swelling (baseline). Negative for chest pain.        Positive for chest \"discomfort\" when she is short of breath.   Gastrointestinal: Positive for abdominal pain (intermittent) and nausea (intermittent).   Genitourinary: Negative for decreased urine volume, difficulty urinating and " "dysuria.   Musculoskeletal: Positive for myalgias.   Neurological: Positive for dizziness.   All other systems reviewed and are negative.      Physical Exam   BP: (!) 195/102  Pulse: 94  Heart Rate: 95  Temp: 98.9  F (37.2  C)  Resp: 20  Height: 174 cm (5' 8.5\")  Weight: 140.6 kg (310 lb)  SpO2: 93 %      Physical Exam  Vitals signs and nursing note reviewed.   Constitutional:       General: She is not in acute distress.     Appearance: She is well-developed. She is obese. She is not ill-appearing or diaphoretic.   HENT:      Head: Normocephalic and atraumatic.      Nose: Nose normal.      Mouth/Throat:      Mouth: Mucous membranes are moist.   Eyes:      General: No scleral icterus.     Conjunctiva/sclera: Conjunctivae normal.   Neck:      Musculoskeletal: Normal range of motion and neck supple. No neck rigidity.   Cardiovascular:      Rate and Rhythm: Normal rate and regular rhythm.      Heart sounds: Normal heart sounds. No murmur.   Pulmonary:      Effort: Pulmonary effort is normal. No respiratory distress.      Breath sounds: No stridor. Decreased breath sounds present. No wheezing, rhonchi or rales.   Abdominal:      General: There is no distension.      Palpations: Abdomen is soft.      Tenderness: There is no abdominal tenderness.   Musculoskeletal: Normal range of motion.         General: No tenderness, deformity or signs of injury.      Right lower leg: No edema.      Left lower leg: No edema.   Skin:     General: Skin is warm and dry.      Coloration: Skin is not jaundiced or pale.      Findings: No rash.   Neurological:      General: No focal deficit present.      Mental Status: She is alert and oriented to person, place, and time.      Sensory: No sensory deficit.      Motor: No weakness.   Psychiatric:         Mood and Affect: Mood and affect normal. Mood is not anxious or depressed. Affect is not labile.         Speech: Speech normal. Speech is not rapid and pressured or slurred.         Behavior: " Behavior normal. Behavior is not agitated, slowed or withdrawn. Behavior is cooperative.         Thought Content: Thought content normal.         ED Course        Procedures             EKG Interpretation:      Interpreted by Amisha Rubalcava MD  Time reviewed: 2001  Symptoms at time of EKG: sob   Rhythm: normal sinus   Rate: normal  Axis: normal  Ectopy: none  Conduction: normal  ST Segments/ T Waves: No ST-T wave changes  Q Waves: none  Comparison to prior: No old EKG available    Clinical Impression: normal EKG                        Labs Ordered and Resulted from Time of ED Arrival Up to the Time of Departure from the ED   COMPREHENSIVE METABOLIC PANEL - Abnormal; Notable for the following components:       Result Value    Chloride 110 (*)     Glucose 109 (*)     Albumin 3.0 (*)     All other components within normal limits   ROUTINE UA WITH MICROSCOPIC - Abnormal; Notable for the following components:    Blood Urine Small (*)     RBC Urine 5 (*)     Squamous Epithelial /HPF Urine 5 (*)     Amorphous Crystals Few (*)     All other components within normal limits   D DIMER QUANTITATIVE - Abnormal; Notable for the following components:    D Dimer 6.0 (*)     All other components within normal limits   LIPASE - Abnormal; Notable for the following components:    Lipase 57 (*)     All other components within normal limits   CBC WITH PLATELETS DIFFERENTIAL   TROPONIN I   NT PROBNP INPATIENT   TROPONIN I            Assessments & Plan (with Medical Decision Making)   Delilah Fountain is a 54 year old female with a history of type I diabetes mellitus s/p kidney and pancreas transplant (2000), HTN, and sleep apnea who presents to the ED for evaluation of increased shortness of breath.    Ddx: CHF, ACUTE CORONARY SYNDROME, PE, HTN emergency, pulmonary edema, medication side effect, kidney failure    Patient NAD on exam. HTN with HR in 90s. Sats variable in low to high 90s on RA. Lungs clear but diminished. Xray with  prominent interstitial lung markings--possibly pulmonary edema. No PNA. BNP wnl. Trop neg. EKG nl. Dimer elevated. H/o previous Type I DM prior to kidney/panc tx in 2000. Also family hx of CAD and personal h/o HTN, obesity. Will obtain V/Q scan to rule out PE and admit to ED obs for ACUTE CORONARY SYNDROME rule out.     I have reviewed the nursing notes.    I have reviewed the findings, diagnosis, plan and need for follow up with the patient.    Current Discharge Medication List          Final diagnoses:   Dyspnea, unspecified type     I, Sarah Fitzgerald, am serving as a trained medical scribe to document services personally performed by Amisha Rubalcava MD, based on the provider's statements to me.      I, Amisha Rubalcava MD, was physically present and have reviewed and verified the accuracy of this note documented by Sarah Fitzgerald.    2/28/2020   [facility], EMERGENCY DEPARTMENT     Amisha Rubalcava MD  02/29/20 0124

## 2020-02-29 NOTE — CONSULTS
HEMATOLOGY CONSULT NOTE    Subjective     REFERRAL SOURCE  ED Obs    CONSULT INDICATION:  Multiple PE's with IVC filter in place, not on anticoagulation    HISTORY OF PRESENT ILLNESS:  Ms. Delilah Fountain is a 54 year old female with history of T1DM s/p kidney and pancreas transplant in 2000, provoked DVT following transplant s/p IVC filter placement in 2000, HTN, HERLINDA, and morbid obesity who was admitted with increasing shortness of breath and was found to have bilateral acute pulmonary emboli.     Patient has history of a provoked RLE DVT following her kidney/pancreas transplant surgery in 2000. She had an IVC filter placed but was eventually able to complete a short course of Lovenox. The IVC filter appears to have tip embedded in the vessel wall since at least 2013 and thus has not been removed. She denies any recurrent clots until now.    Over the past month, she had noticed worsening dyspnea on exertion but initially thought it was just deconditioning. Also has occasional lightheadedness with standing. Has lower extremity swelling on and off but no pain. Otherwise no fever/chills, chest pain, N/V, abdominal pain, or bleeding. She denies any recent surgeries, hospitalizations, or travel. She does endorse being less active lately. Denies smoking. Is going through megan-menopause but not on any estrogen products.       Past Medical History:   Diagnosis Date     Allergic rhinitis      Anxiety      Gastro-oesophageal reflux disease      Sleep apnea     uses cpap machine        History reviewed. No pertinent family history.   Father also has history of blood clots.     Social History     Socioeconomic History     Marital status: Single     Spouse name: Not on file     Number of children: Not on file     Years of education: Not on file     Highest education level: Not on file   Occupational History     Not on file   Social Needs     Financial resource strain: Not on file     Food  insecurity:     Worry: Not on file     Inability: Not on file     Transportation needs:     Medical: Not on file     Non-medical: Not on file   Tobacco Use     Smoking status: Never Smoker     Smokeless tobacco: Never Used   Substance and Sexual Activity     Alcohol use: Yes     Comment: rarely     Drug use: No     Sexual activity: Not on file   Lifestyle     Physical activity:     Days per week: Not on file     Minutes per session: Not on file     Stress: Not on file   Relationships     Social connections:     Talks on phone: Not on file     Gets together: Not on file     Attends Zoroastrian service: Not on file     Active member of club or organization: Not on file     Attends meetings of clubs or organizations: Not on file     Relationship status: Not on file     Intimate partner violence:     Fear of current or ex partner: Not on file     Emotionally abused: Not on file     Physically abused: Not on file     Forced sexual activity: Not on file   Other Topics Concern     Parent/sibling w/ CABG, MI or angioplasty before 65F 55M? Not Asked   Social History Narrative     Not on file           Allergies   Allergen Reactions     Wellbutrin [Bupropion] Other (See Comments)     Pt. Reports that it made her more weird     Medications Prior to Admission   Medication Sig Dispense Refill Last Dose     ASPIRIN PO Take 81 mg by mouth daily.   Past Month     azaTHIOprine (IMURAN) 50 MG tablet Take 3 tablets (150 mg) by mouth daily 90 tablet 0 2/28/2020     PROGRAF (BRAND) 1 MG capsule Take 2 capsules (2 mg) by mouth 2 times daily 120 capsule 11 2/29/2020     sulfamethoxazole-trimethoprim (BACTRIM/SEPTRA) 400-80 MG per tablet Take 1 tablet by mouth Every Mon, Wed, Fri Morning 40 tablet 0 2/29/2020     venlafaxine (EFFEXOR) 75 MG tablet Take 150 mg by mouth every morning  1 tablet 0 2/29/2020     atorvastatin (LIPITOR) 10 MG tablet Take 1 tablet (10 mg) by mouth daily 90 tablet 3      calcium carbonate (OS-RENATO 500 MG [place]. [place]) 500  "MG tablet Take 500 mg by mouth 2 times daily   Taking     Cetirizine HCl (ZYRTEC ALLERGY PO) Take 5 mg by mouth 2 times daily   Taking     cholecalciferol (VITAMIN D3) 1000 units (25 mcg) capsule Take 1 capsule by mouth daily. 30 capsule 11      order for DME AIRSENSE 10  10-15CM/H20  PILLOWS CHRISTIANSON FX FOR HER   Taking     sodium bicarbonate 650 MG tablet Take 2 tablets (1,300 mg) by mouth 2 times daily 360 tablet 3 Taking       REVIEW OF SYSTEMS:  Reviewed and negative other than that mentioned in HPI.     Objective     VITAL SIGNS:  BP (!) 138/91 (BP Location: Right arm)   Pulse 85   Temp 98.5  F (36.9  C) (Oral)   Resp 16   Ht 1.74 m (5' 8.5\")   Wt 134.7 kg (297 lb)   LMP 2015   SpO2 93%   Breastfeeding No   BMI 44.50 kg/m       PHYSICAL EXAM:  Vitals reviewed.  General: Awake, alert, in no acute distress.   ENT: Normocephalic, atraumatic. Mucous membranes moist.   CV: Regular rate and rhythm. No murmurs, rubs, or gallops appreciated.  Resp: Good inspiratory effort, lungs clear to auscultation bilaterally.  Abd: Obese. Nondistended.  Ext: 1+ pitting edema bilaterally.  Neuro: CN II-XII grossly intact. Moves all extremities spontaneously.   Skin: No rash, unusual bruising or prominent lesions.    LABS:  Lab Results   Component Value Date    WBC 9.5 2020    HGB 14.5 2020    HCT 44.2 2020    MCV 94 2020     2020     Lab Results   Component Value Date     2020    BUN 13 2020    ANIONGAP 4 2020     Lab Results   Component Value Date    ALT 17 2020    AST 21 2020    ALKPHOS 83 2020     Lab Results   Component Value Date    INR 1.08 2013       IMAGIN20 CTA Chest:  1.  Exam is positive for acute pulmonary embolism. Extensive pulmonary embolism involving both the right and left pulmonary artery distally, extending into the majority of the lobar pulmonary arteries with the exception of the left lower lobe pulmonary " artery. Extensive segmental subsegmental pulmonary embolism throughout both lungs.    2.  Evidence for right heart strain or increased right heart  pressures?  is not present.   3.  Atrophic native kidneys are partially visualized. Partially  visualized IVC filter.    Assessment & Plan   #1 Acute extensive bilateral pulmonary emboli  #2 History of provoked RLE DVT s/p IVC filter placement in 2000    Pleasant 54-yo female who presented with worsening dyspnea over the last month and was found to have acute extensive PE bilaterally despite having an IVC filter placed in 2000. However, the filter appears to have embedded in the vessel wall and may actually be increasing her thrombogenic risk. Would consider this event unprovoked vs mildly provoked by non-modifiable risk factors (decreased mobility, morbid obesity, IVC filter). Therefore would recommend indefinite anticoagulation either way. Will also check antiphospholipid antibodies to rule out APS.     In terms of anticoagulation, warfarin would be preferred over DOACs due to her obesity and possible drug interactions. We discussed the logistics of warfarin with her, although she should still get formal warfarin education.     Recommendations:  1. Start Lovenox 1 mg/kg BID and bridge to warfarin until INR >2.0. If discharging, will need to f/u with her PCP to determine when she can stop Lovenox.  2. Added on lupus anticoagulant, anticardiolipin Ab, and beta-2 glycoprotein Ab.   3. Would ensure that patient is up-to-date on age appropriate cancer screening as outpatient.   4. Will plan to see patient back at the [facility] in ~3 months to review long-term anticoagulation plan and recheck APS labs if needed. If any questions come up in the meantime, she can contact the [facility] (contact info placed in discharge AVS).     Thank you for this consult. This patient was seen and discussed with Dr. Bustmaante.    Adela Roberts MD   Hematology-Oncology  Fellow, PGY-4

## 2020-02-29 NOTE — H&P
ED OBSERVATION HISTORY & PHYSICAL    Admission Date: 20  Attending Physician: Dr. Cerda  NP/PA: Krissy Godwin PA-C    REASON FOR ADMISSION:   Chief Complaint   Patient presents with     Shortness of Breath     Dizziness     Palpitations       HPI:  Delilah Fountain is a 54 year old female with a history of type I diabetes mellitus s/p kidney and pancreas transplant (), HTN, and sleep apnea who presents to the ED for evaluation of increased shortness of breath. Patient reports she has felt short of breath while walking short distances as well as walking up stairs since 20. She states her shortness of breath is not worse while lying down. She reports she feels chest discomfort when she is short of breath. She specifically denies chest pain. Patient states she has a little swelling in her legs, but notes this is normal for her since her transplant surgery. Patient denies previous history of heart failure or heart disease. She reports she had a DVT in her right leg after her transplant surgery. She denies previous history of PE. She states she has gained 8 pounds within the past year, but denies any recent weight gain. Patient denies decreased urine output, difficulty urinating, or dysuria. She states she had a stress test done 5-6 years ago, and it was normal. She notes her maternal grandfather  of heart disease in his 80's. Patient denies anticoagulants. She reports she is compliant with her anti-rejection medications. She states she has sleep apnea and uses a CPAP machine at night. She reports her blood pressure has not been higher than normal. She is a non-smoker. Patient denies estrogen supplements.     Of note, patient reports her Effexor was recently increased to 150 mg BID. She states she has been off and on Effexor over the past month, because she was having difficulty getting her prescription refilled. She is concerned that her shortness of breath is associated with withdrawal symptoms she  "has been experiencing from the Effexor. She reports her withdrawal symptoms consist of dizziness, sweats, fevers, and body aches. She also states she has intermittent nausea and abdominal pain. She reports she has a cough, but states this is a side effect from one of her medications. Patient notes she sees both a neuropsychiatrist and a psychologist.    In the ED she had a troponin that was negative and EKG that was negative for signs of ACS. She had an elevated D dimer and was unable to have contrast due to history of kidney and pancreas transplant, so a VQ scan was ordered.    On admission to the observation unit the patient was stable. She has no dyspnea at rest, only with exertion. Recently had mild cold symptoms and dry cough. Ears bother her intermittently x 1 year. No n/v/d/c. No chest pain. LE with 1+ pitting edema bilaterally and she notes having a \"amber horse\" in the right leg a couple days ago, pain went away but did come back slightly since she arrived in the ED.    ROS per ED note  Constitutional: Positive for diaphoresis and fever (subjective). Negative for unexpected weight change (gained 8 lbs in past year, but nothng recent).   Respiratory: Positive for cough (\"side effect of medication\") and shortness of breath.    Cardiovascular: Positive for leg swelling (baseline). Negative for chest pain.        Positive for chest \"discomfort\" when she is short of breath.   Gastrointestinal: Positive for abdominal pain (intermittent) and nausea (intermittent).   Genitourinary: Negative for decreased urine volume, difficulty urinating and dysuria.   Musculoskeletal: Positive for myalgias.   Neurological: Positive for dizziness.   All other systems reviewed and are negative.    ROS negative other than the symptoms noted above.    History:    Past Medical History:   Diagnosis Date     Allergic rhinitis      Anxiety      Gastro-oesophageal reflux disease      Sleep apnea     uses cpap machine       Past Surgical " History:   Procedure Laterality Date     EYE SURGERY      left and right cataract surgeries     HERNIA REPAIR      ventral     HERNIORRHAPHY INCISIONAL (LOCATION)  3/21/2013    Procedure: HERNIORRHAPHY INCISIONAL (LOCATION);  Open Incisional Hernia  Anesthesia General with block;  Surgeon: Rhonda Pandey MD;  Location: [facility] OR     TRANSPLANT  2000    kidney/pancreas       History reviewed. No pertinent family history.    Social History     Socioeconomic History     Marital status: Single     Spouse name: Not on file     Number of children: Not on file     Years of education: Not on file     Highest education level: Not on file   Occupational History     Not on file   Social Needs     Financial resource strain: Not on file     Food insecurity:     Worry: Not on file     Inability: Not on file     Transportation needs:     Medical: Not on file     Non-medical: Not on file   Tobacco Use     Smoking status: Never Smoker     Smokeless tobacco: Never Used   Substance and Sexual Activity     Alcohol use: Yes     Comment: rarely     Drug use: No     Sexual activity: Not on file   Lifestyle     Physical activity:     Days per week: Not on file     Minutes per session: Not on file     Stress: Not on file   Relationships     Social connections:     Talks on phone: Not on file     Gets together: Not on file     Attends Pentecostalism service: Not on file     Active member of club or organization: Not on file     Attends meetings of clubs or organizations: Not on file     Relationship status: Not on file     Intimate partner violence:     Fear of current or ex partner: Not on file     Emotionally abused: Not on file     Physically abused: Not on file     Forced sexual activity: Not on file   Other Topics Concern     Parent/sibling w/ CABG, MI or angioplasty before 65F 55M? Not Asked   Social History Narrative     Not on file       No current facility-administered medications on file prior to encounter.   ASPIRIN PO, Take 81 mg by  "mouth daily.  atorvastatin (LIPITOR) 10 MG tablet, Take 1 tablet (10 mg) by mouth daily  azaTHIOprine (IMURAN) 50 MG tablet, Take 3 tablets (150 mg) by mouth daily  calcium carbonate (OS-RENATO 500 MG [place]. [place]) 500 MG tablet, Take 500 mg by mouth 2 times daily  Cetirizine HCl (ZYRTEC ALLERGY PO), Take 5 mg by mouth 2 times daily  cholecalciferol (VITAMIN D3) 1000 units (25 mcg) capsule, Take 1 capsule by mouth daily.  order for DME, AIRSENSE 10  10-15CM/H20  PILLOWS CHRISTIANSON FX FOR HER  PROGRAF (BRAND) 1 MG capsule, Take 2 capsules (2 mg) by mouth 2 times daily  sodium bicarbonate 650 MG tablet, Take 2 tablets (1,300 mg) by mouth 2 times daily  sulfamethoxazole-trimethoprim (BACTRIM/SEPTRA) 400-80 MG per tablet, Take 1 tablet by mouth Every Mon, Wed, Fri Morning  venlafaxine (EFFEXOR) 75 MG tablet, Take 150 mg by mouth every morning       Exam:  BP (!) 159/84   Pulse 98   Temp 98.7  F (37.1  C) (Oral)   Resp 16   Ht 1.74 m (5' 8.5\")   Wt 140.6 kg (310 lb)   LMP 2015   SpO2 91%   Breastfeeding No   BMI 46.45 kg/m      Vitals signs and nursing note reviewed.   Constitutional:       General: She is not in acute distress.     Appearance: She is well-developed. She is obese. She is not ill-appearing or diaphoretic.   HENT:      Head: Normocephalic and atraumatic.   Eyes:      General: No scleral icterus.     Conjunctiva/sclera: Conjunctivae normal.   Neck:      Musculoskeletal: Normal range of motion and neck supple. No neck rigidity.   Cardiovascular:   RRR without murmur  Pulmonary:   Breathing comfortably on room air. CTAB, but decreased breath sounds in the bases  Abdominal:     normoactive BS, soft, nontender  Musculoskeletal:      Right lower le+ pitting edema     Left lower le+ pitting edema  Skin:     General: Skin is warm and dry.      Findings: No rash.   Neurological:      General: No focal deficit present.   Psychiatric:        Mood, affect and behavior all normal       Data:    Results for " orders placed or performed during the hospital encounter of 02/28/20   XR Chest 2 Views     Status: None    Narrative    Exam: XR CHEST 2 VW, 2/28/2020 8:24 PM    Indication: SOB    Comparison: 3/18/2013.    Findings:   PA and lateral views of the chest. Lung volumes are normal. Cardiac  silhouette is mildly enlarged. Prominent interstitial lung markings  without focal airspace disease. No pleural effusion or pneumothorax.  Visualized upper abdomen is unremarkable. Partially visualized IVC  filter. Degenerative changes of thoracic spine.      Impression    Impression: Mild cardiomegaly with prominent interstitial lung  markings which may represent mild interstitial pulmonary edema. No  focal pneumonia.    I have personally reviewed the examination and initial interpretation  and I agree with the findings.    YAMILKA OLIVA MD   CBC with platelets differential     Status: None   Result Value Ref Range    WBC 9.5 4.0 - 11.0 10e9/L    RBC Count 4.68 3.8 - 5.2 10e12/L    Hemoglobin 14.5 11.7 - 15.7 g/dL    Hematocrit 44.2 35.0 - 47.0 %    MCV 94 78 - 100 fl    MCH 31.0 26.5 - 33.0 pg    MCHC 32.8 31.5 - 36.5 g/dL    RDW 13.8 10.0 - 15.0 %    Platelet Count 240 150 - 450 10e9/L    Diff Method Automated Method     % Neutrophils 79.1 %    % Lymphocytes 10.4 %    % Monocytes 8.1 %    % Eosinophils 1.6 %    % Basophils 0.2 %    % Immature Granulocytes 0.6 %    Nucleated RBCs 0 0 /100    Absolute Neutrophil 7.5 1.6 - 8.3 10e9/L    Absolute Lymphocytes 1.0 0.8 - 5.3 10e9/L    Absolute Monocytes 0.8 0.0 - 1.3 10e9/L    Absolute Eosinophils 0.2 0.0 - 0.7 10e9/L    Absolute Basophils 0.0 0.0 - 0.2 10e9/L    Abs Immature Granulocytes 0.1 0 - 0.4 10e9/L    Absolute Nucleated RBC 0.0    Comprehensive metabolic panel     Status: Abnormal   Result Value Ref Range    Sodium 140 133 - 144 mmol/L    Potassium 4.1 3.4 - 5.3 mmol/L    Chloride 110 (H) 94 - 109 mmol/L    Carbon Dioxide 26 20 - 32 mmol/L    Anion Gap 4 3 - 14 mmol/L     Glucose 109 (H) 70 - 99 mg/dL    Urea Nitrogen 13 7 - 30 mg/dL    Creatinine 0.70 0.52 - 1.04 mg/dL    GFR Estimate >90 >60 mL/min/[1.73_m2]    GFR Estimate If Black >90 >60 mL/min/[1.73_m2]    Calcium 8.9 8.5 - 10.1 mg/dL    Bilirubin Total 0.4 0.2 - 1.3 mg/dL    Albumin 3.0 (L) 3.4 - 5.0 g/dL    Protein Total 7.6 6.8 - 8.8 g/dL    Alkaline Phosphatase 83 40 - 150 U/L    ALT 17 0 - 50 U/L    AST 21 0 - 45 U/L   Troponin I     Status: None   Result Value Ref Range    Troponin I ES <0.015 0.000 - 0.045 ug/L   Nt probnp inpatient     Status: None   Result Value Ref Range    N-Terminal Pro BNP Inpatient 113 0 - 900 pg/mL   UA with Microscopic     Status: Abnormal   Result Value Ref Range    Color Urine Yellow     Appearance Urine Clear     Glucose Urine Negative NEG^Negative mg/dL    Bilirubin Urine Negative NEG^Negative    Ketones Urine Negative NEG^Negative mg/dL    Specific Gravity Urine 1.016 1.003 - 1.035    Blood Urine Small (A) NEG^Negative    pH Urine 6.5 5.0 - 7.0 pH    Protein Albumin Urine Negative NEG^Negative mg/dL    Urobilinogen mg/dL Normal 0.0 - 2.0 mg/dL    Nitrite Urine Negative NEG^Negative    Leukocyte Esterase Urine Negative NEG^Negative    Source Urine     WBC Urine 1 0 - 5 /HPF    RBC Urine 5 (H) 0 - 2 /HPF    Squamous Epithelial /HPF Urine 5 (H) 0 - 1 /HPF    Amorphous Crystals Few (A) NEG^Negative /HPF   D dimer quantitative     Status: Abnormal   Result Value Ref Range    D Dimer 6.0 (H) 0.0 - 0.50 ug/ml FEU   Lipase     Status: Abnormal   Result Value Ref Range    Lipase 57 (L) 73 - 393 U/L   EKG 12-lead, tracing only     Status: None (Preliminary result)   Result Value Ref Range    Interpretation ECG Click View Image link to view waveform and result              EKG Interpretation:      Interpreted by Amisha Rubalcava MD  Time reviewed: 2001  Symptoms at time of EKG: sob   Rhythm: normal sinus   Rate: normal  Axis: normal  Ectopy: none  Conduction: normal  ST Segments/ T Waves: No ST-T wave  "changes  Q Waves: none  Comparison to prior: No old EKG available     Clinical Impression: normal EKG      Assessment/Plan:  Delilah Fountain is a 54 year old female with a history of type I diabetes mellitus s/p kidney and pancreas transplant (2000), HTN, and sleep apnea who presents to the ED for evaluation of dyspnea with exertion    1. Dyspnea and chest pain on exertion  - EKG and trop negative, repeat q4 hours x 2. Stress test ordered for the morning - she is ok with treadmill or bike. NPO.   - D dimer elevated, Hx of DVT and IVC filter placed in 2000, not on anticoagulation. Unable to obtain CTangio due to contrast with hx of kidney transplant, so VQ scan was ordered. She also has 1+ pitting edema in LE and recent \"amber horse\" sensation in RLE. Ordered doppler us of both LE to evaluate for DVT - will be done in the morning  - CXR showed prominent interstitial markings, could be edema, but no focal pneumonia  - On the differential is symptoms from withdrawal since she hasn't been taking effexor routinely? Ordered her PTA admission dose of 150mg qam and 75mg qhs    2. 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 CV  - cont lipitor    FEN:  - takes Na bicarb repletion, continue PTA dosing  - NPO for stress echo in the morning  - Monitor BMP and replace electrolytes per protocol    Prophy:  -No VTE prophy as patient is up ad dayron and anticipate short observation stay   -Encourage ambulation as tolerated   -for GI prophy  -PRN Senna and Miralax    Consults: cardiology for stress echo      DISPOSITION: Pending further workup of dyspnea on exertion      Krissy Godwin PA-C  Emergency Department Observation Unit            "

## 2020-02-29 NOTE — PHARMACY-ANTICOAGULATION SERVICE
Clinical Pharmacy - Warfarin Dosing Consult     Pharmacy has been consulted to manage this patient s warfarin therapy.  Indication: DVT/ PE Treatment  Therapy Goal: INR 2-3  Provider/Team: OBS  Warfarin Prior to Admission: No  Significant drug interactions: aspirin    INR   Date Value Ref Range Status   03/21/2013 1.08 0.86 - 1.14 Final   02/15/2013 1.12 0.86 - 1.14 Final       Recommend warfarin 5 mg today.  Pharmacy will monitor Delilah Fountain daily and order warfarin doses to achieve specified goal.      Please contact pharmacy as soon as possible if the warfarin needs to be held for a procedure or if the warfarin goals change.

## 2020-02-29 NOTE — PROGRESS NOTES
Pt here from  for dobutamine stress test.  Test, meds and side effects reviewed with patient.  Achieved target HR at 30 mcg Dobutamine and a total of 0.2 mg IV atropine.  Patient did report midsternal chest pain she rated at a 7/10 and nausea at peak heart rate.  Gave a total of 5 mg IV Metoprolol to bring HR back to baseline.  Post monitoring complete and VSS.  Patient reports that her chest pain and nausea has completely resolved as her HR has returned to baseline.  Pt transferred back to  via transport.

## 2020-02-29 NOTE — ED TRIAGE NOTES
Pt. Presents to ED with complaints of SOB especially upon exertion along with dizziness and palpitations that has worsened over the course of the last week. Pt. Reports she has had several medication and dosage changes recently with her effexor and believes it could be causing her symptoms. Pt. Denies chest pain, swelling, or voiding changes. Pt. Hypertensive, OVSS on RA. A & O x 4, independent.

## 2020-03-01 LAB — INTERPRETATION ECG - MUSE: NORMAL

## 2020-03-01 NOTE — PLAN OF CARE
"- Serial troponins and stress test complete. Yes  - Seen and cleared by consultant if applicable. Yes  - Adequate pain control on oral analgesia. Yes.  - Vital signs normal or at patient baseline. Yes.  - Safe disposition plan has been identified. Yes       /83 (BP Location: Right arm)   Pulse 89   Temp 98.3  F (36.8  C) (Oral)   Resp 16   Ht 1.74 m (5' 8.5\")   Wt 134.7 kg (297 lb)   LMP 11/06/2015   SpO2 93%   Breastfeeding No   BMI 44.50 kg/m      "

## 2020-03-01 NOTE — PROGRESS NOTES
Pt ambulated to main lobby for ride home with family. Will  prescription from [facility]. All belonging with pt.

## 2020-03-02 DIAGNOSIS — Z94.83 PANCREAS REPLACED BY TRANSPLANT (H): ICD-10-CM

## 2020-03-02 DIAGNOSIS — Z94.0 KIDNEY TRANSPLANTED: Primary | ICD-10-CM

## 2020-03-02 LAB
B2 GLYCOPROT1 IGG SERPL IA-ACNC: 1.7 U/ML
B2 GLYCOPROT1 IGM SERPL IA-ACNC: <2.9 U/ML

## 2020-03-02 RX ORDER — AZATHIOPRINE 50 MG/1
150 TABLET ORAL DAILY
Qty: 90 TABLET | Refills: 0 | Status: SHIPPED | OUTPATIENT
Start: 2020-03-02 | End: 2020-04-22

## 2020-03-03 LAB
CARDIOLIPIN ANTIBODY IGG: <1.6 GPL-U/ML (ref 0–19.9)
CARDIOLIPIN ANTIBODY IGM: 1.9 MPL-U/ML (ref 0–19.9)
LA PPP-IMP: NEGATIVE

## 2020-04-22 DIAGNOSIS — Z94.83 PANCREAS REPLACED BY TRANSPLANT (H): ICD-10-CM

## 2020-04-22 DIAGNOSIS — Z94.0 KIDNEY TRANSPLANTED: Primary | ICD-10-CM

## 2020-04-22 RX ORDER — AZATHIOPRINE 50 MG/1
150 TABLET ORAL DAILY
Qty: 90 TABLET | Refills: 0 | Status: SHIPPED | OUTPATIENT
Start: 2020-04-22 | End: 2020-06-09

## 2020-06-09 DIAGNOSIS — Z94.83 PANCREAS REPLACED BY TRANSPLANT (H): ICD-10-CM

## 2020-06-09 DIAGNOSIS — Z94.0 KIDNEY TRANSPLANTED: Primary | ICD-10-CM

## 2020-06-09 RX ORDER — AZATHIOPRINE 50 MG/1
150 TABLET ORAL DAILY
Qty: 90 TABLET | Refills: 0 | Status: SHIPPED | OUTPATIENT
Start: 2020-06-09 | End: 2020-08-13

## 2020-06-11 ENCOUNTER — VIRTUAL VISIT (OUTPATIENT)
Dept: HEMATOLOGY | Facility: CLINIC | Age: 55
End: 2020-06-11
Attending: INTERNAL MEDICINE
Payer: COMMERCIAL

## 2020-06-11 DIAGNOSIS — I26.99 OTHER ACUTE PULMONARY EMBOLISM WITHOUT ACUTE COR PULMONALE (H): Primary | ICD-10-CM

## 2020-06-11 PROCEDURE — 99213 OFFICE O/P EST LOW 20 MIN: CPT | Mod: TEL | Performed by: INTERNAL MEDICINE

## 2020-06-11 NOTE — PROGRESS NOTES
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  She reports no use of OCPs, travel, hospitalizations or surgeries leading up to the diagnosis of pulmonary embolism in February.  She notes that she has been more sedentary.      She has been taking warfarin for anticoagulation and done relatively well and maintaining an INR between 2 and 3.  Her biggest problem has been that she is loss of insurance in the interim and so her monitoring has not been as close as she had hoped.  She notes no easy bruising or bleeding.  She did notice that her right leg is having more discomfort and possibly swelling which makes her concerned about a new clot.  She notes no fevers chills or night sweats.  Otherwise, she has been doing well with no acute concerns.    Past Medical History:  Past Medical History:   Diagnosis Date     Allergic rhinitis      Anxiety      Gastro-oesophageal reflux disease      Sleep apnea     uses cpap machine       Medications:  Current Outpatient Medications   Medication Sig     atorvastatin (LIPITOR) 10 MG tablet Take 1 tablet (10 mg) by mouth daily     azaTHIOprine (IMURAN) 50 MG tablet Take 3 tablets (150 mg) by mouth daily     calcium carbonate (OS-RENATO 500 MG [place]. [place]) 500 MG tablet Take 500 mg by mouth 2  times daily     Cetirizine HCl (ZYRTEC ALLERGY PO) Take 5 mg by mouth 2 times daily     cholecalciferol (VITAMIN D3) 1000 units (25 mcg) capsule Take 1 capsule by mouth daily.     order for DME AIRSENSE 10  10-15CM/H20  PILLOWS CHRISTIANSON FX FOR HER     PROGRAF (BRAND) 1 MG capsule Take 2 capsules (2 mg) by mouth 2 times daily     sodium bicarbonate 650 MG tablet Take 2 tablets (1,300 mg) by mouth 2 times daily     sulfamethoxazole-trimethoprim (BACTRIM/SEPTRA) 400-80 MG per tablet Take 1 tablet by mouth Every Mon, Wed, Fri Morning     venlafaxine (EFFEXOR) 75 MG tablet Take 150 mg by mouth every morning      warfarin ANTICOAGULANT (COUMADIN) 5 MG tablet Take 1 tablet (5 mg) by mouth daily (Patient taking differently: Take 10 mg by mouth daily )     No current facility-administered medications for this visit.        Allergies:  Allergies   Allergen Reactions     Wellbutrin [Bupropion] Other (See Comments)     Pt. Reports that it made her more weird       ROS:  A 14 point ROS is negative except as stated in the HPI    Objective:  She is in no distress today.  Her speech is linear.  She has no shortness of breath or cough during today's interview.  Her affect is normal.    Labs:  Results for HARI BROWNLEE (MRN [id number]) as of 2020 17:20   Ref. Range 2020 13:59   Cardiolipin Antibody IgG Latest Ref Range: 0.0 - 19.9 GPL-U/mL <1.6   Cardiolipin Antibody IgM Latest Ref Range: 0.0 - 19.9 MPL-U/mL 1.9   Beta 2 Glycoprotein 1 Antibody IgG Latest Ref Range: <7 U/mL 1.7   Beta 2 Glycoprotein 1 Antibody IgM Latest Ref Range: <7 U/mL <2.9     Results for HARI BROWNLEE (MRN [id number]) as of 2020 17:20   Ref. Range 2020 13:59   Lupus Result Latest Ref Range: NEG^Negative  Negative       Imagin2020 CT angiogram results:    Exam is positive for acute pulmonary embolism. Extensive pulmonary  embolism involving both the right and left pulmonary artery distally,  extending into the majority  of the lobar pulmonary arteries with the  exception of the left lower lobe pulmonary artery. Extensive segmental  subsegmental pulmonary embolism throughout both lungs.         Assessment:  In summary, Delilah Fountain is a 54 year old woman with a history of a surgically provoked DVT about 20 years ago with an indwelling IVC filter, and now recurrent pulmonary embolism that appears to be unprovoked.  Her risk factors for this thrombotic event include BMI greater than 30 and presence of an IVC filter.  I think she needs to be treated for this pulmonary embolism for 6 months and then consider indefinite anticoagulation.     I do not feel strongly that she needs indefinite anticoagulation, though she does have some persistent risk factors including the presence of an IVC filter and BMI greater than 30.  If we were to treat this is an unprovoked pulmonary embolism I suggested we use the HERDOO2 stratification system to do so.  Her only point is for her weight and so I suggested that a d-dimer test may be helpful in determining her risk for recurrence.  On the other hand, she return in 6 months and feels strongly that she is nervous about her risk of recurrence I would be comfortable with her continuing on indefinite anticoagulation.    With regard to use of direct acting oral anticoagulants, I do not currently see a contraindication for use of these agents.  There is no drug interaction with her immunosuppressive's for transplant.  While her BMI is greater than 30 there is less concerned about use of these agents and patients with higher BMI after recent study showed no high risk for recurrence then those with a normal BMI.    Plan:  1. Majority of today's visit was spent counseling the patient regarding natural history, treatment and re-stratification for recurrence of venous thromboembolic disease.  2.  She will continue warfarin until September 2020 when she will have completed 6 months of anticoagulation.  3.   If she struggles with warfarin monitoring then I would suggest we switch her to either apixaban or rivaroxaban.  We will check on the price of these medications for her given her concerns about cost.  4.  She will return in September 2020 discuss secondary prophylaxis.  If she is concerned about the risk of recurrence then I am comfortable with her continuing indefinite anticoagulation.  If it is helpful to her to have further risk ratification I think a d-dimer test with mom month off anticoagulation would be a reasonable approach, using the [employer] risk assessment system.    The patient is given our center's contact information and is instructed to call if she should have any further questions or concerns.     Total Time Spent:  I spent a total of 32 minutes by phone Delilah Fountain during today's visit.    Desmond Blanco MD   of Medicine  [facility]

## 2020-06-12 ENCOUNTER — TELEPHONE (OUTPATIENT)
Dept: NEPHROLOGY | Facility: CLINIC | Age: 55
End: 2020-06-12

## 2020-06-12 DIAGNOSIS — I26.99 OTHER ACUTE PULMONARY EMBOLISM WITHOUT ACUTE COR PULMONALE (H): Primary | ICD-10-CM

## 2020-06-12 NOTE — TELEPHONE ENCOUNTER
Prior Authorization Approval    Authorization Effective Date: 6/1/2020  Authorization Expiration Date: 6/12/2021  Medication: Prograf  Approved Dose/Quantity:120  Reference #: [id number]    Insurance Company: [employer] - Phone [phone] Fax [phone]  Expected CoPay:       CoPay Card Available:      Foundation Assistance Needed:    Which Pharmacy is filling the prescription (Not needed for infusion/clinic administered): [facility] [place] [address]  Pharmacy Notified: Yes  Patient Notified: Yes

## 2020-08-17 DIAGNOSIS — Z94.0 KIDNEY REPLACED BY TRANSPLANT: ICD-10-CM

## 2020-08-17 DIAGNOSIS — Z94.0 KIDNEY TRANSPLANTED: Primary | ICD-10-CM

## 2020-08-17 DIAGNOSIS — Z94.83 PANCREAS REPLACED BY TRANSPLANT (H): ICD-10-CM

## 2020-08-17 RX ORDER — TACROLIMUS 1 MG/1
2 CAPSULE, GELATIN COATED ORAL 2 TIMES DAILY
Qty: 120 CAPSULE | Refills: 0 | Status: SHIPPED | OUTPATIENT
Start: 2020-08-17 | End: 2020-09-21

## 2020-09-09 ENCOUNTER — TRANSFERRED RECORDS (OUTPATIENT)
Dept: HEALTH INFORMATION MANAGEMENT | Facility: CLINIC | Age: 55
End: 2020-09-09

## 2020-09-09 LAB
ALT SERPL-CCNC: 15 IU/L (ref 0–32)
AST SERPL-CCNC: 13 IU/L (ref 0–40)
CHOLEST SERPL-MCNC: 136 MG/DL (ref 100–199)
CREAT SERPL-MCNC: 0.66 MG/DL (ref 0.57–1)
GFR SERPL CREATININE-BSD FRML MDRD: 100 ML/MIN/1.73M2
GLUCOSE SERPL-MCNC: 135 MG/DL (ref 65–99)
HDLC SERPL-MCNC: 38 MG/DL
LDLC SERPL CALC-MCNC: 74 MG/DL (ref 0–99)
POTASSIUM SERPL-SCNC: 3.6 MMOL/L (ref 3.5–5.2)
TRIGL SERPL-MCNC: 138 MG/DL (ref 0–149)

## 2020-09-14 DIAGNOSIS — Z94.83 PANCREAS REPLACED BY TRANSPLANT (H): ICD-10-CM

## 2020-09-14 DIAGNOSIS — Z94.0 KIDNEY TRANSPLANTED: Primary | ICD-10-CM

## 2020-09-14 RX ORDER — AZATHIOPRINE 50 MG/1
150 TABLET ORAL DAILY
Qty: 90 TABLET | Refills: 1 | Status: SHIPPED | OUTPATIENT
Start: 2020-09-14 | End: 2020-12-16

## 2020-09-16 ENCOUNTER — VIRTUAL VISIT (OUTPATIENT)
Dept: HEMATOLOGY | Facility: CLINIC | Age: 55
End: 2020-09-16
Attending: INTERNAL MEDICINE
Payer: COMMERCIAL

## 2020-09-16 VITALS — BODY MASS INDEX: 46.45 KG/M2 | WEIGHT: 293 LBS

## 2020-09-16 DIAGNOSIS — I26.99 OTHER ACUTE PULMONARY EMBOLISM WITHOUT ACUTE COR PULMONALE (H): Primary | ICD-10-CM

## 2020-09-16 PROCEDURE — 99214 OFFICE O/P EST MOD 30 MIN: CPT | Mod: TEL | Performed by: INTERNAL MEDICINE

## 2020-09-16 NOTE — PROGRESS NOTES
Patient was contacted to complete the pre-visit call prior to their telephone visit with the provider.  The following statement was read:       This visit will be billed to your insurance the same as an in-person visit. Because of Coronavirus we are instituting telephone visits when possible to keep everyone safe. The telephone visit will be a call between you and the provider.  This service lets us provide the care you need with a telephone conversation.  If a prescription is necessary, we can send it directly to your pharmacy.If lab work or other testing is needed, we can help arrange a place/time for that to be done at a later date.If during the course of the call the provider feels a telephone visit is not appropriate, then your insurance company will not be billed.       Allergies and medications were reviewed and travel screening complete.     I thanked them for their time to cover this information.     Dannie Jackson [facility]        [facility]  [address] [place] [address]  Phone: [phone], Fax: [phone]      Outpatient Visit Note:    Patient: Delilah Fountain  MRN: [id number]  : 1965  DONALDO: Sep 16, 2020    Reason for Visit:  Follow up for recurrent DVT (telephone visit)    Forward History:  DVT in , post op for combined pancreas/kidney transplant for T1DM, treated with LMWH and IVC filter (permanent)  2020 unprovoked bilateral pulmonary embolism, started on LMWH and warfarin (concerns about drug interactions and BMI)     Interval History: Delilah Fountain is a 54 year old female with a history of recurrent VTE who returns for routine follow up after completing treatment for pulmonary embolism.  She reports she is doing well today.  She has no complaints of chest pain or shortness of breath.  She is concerned about her risk of recurrence as we discussed at the last visit.  She has been getting local INR checks that are not available for  my review but she reports she has had good control with warfarin and no concerns about the medication other than the cumbersome nature of this therapy.    Past Medical History:  Past Medical History:   Diagnosis Date     Allergic rhinitis      Anxiety      Gastro-oesophageal reflux disease      Sleep apnea     uses cpap machine       Medications:  Current Outpatient Medications   Medication Sig     atorvastatin (LIPITOR) 10 MG tablet Take 1 tablet (10 mg) by mouth daily     azaTHIOprine (IMURAN) 50 MG tablet Take 3 tablets (150 mg) by mouth daily     calcium carbonate (OS-RENATO 500 MG [place]. [place]) 500 MG tablet Take 500 mg by mouth 2 times daily     Cetirizine HCl (ZYRTEC ALLERGY PO) Take 5 mg by mouth 2 times daily     cholecalciferol (VITAMIN D3) 1000 units (25 mcg) capsule Take 1 capsule by mouth daily.     order for DME AIRSENSE 10  10-15CM/H20  PILLOWS CHRISTIANSON FX FOR HER     PROGRAF (BRAND) 1 MG capsule Take 2 capsules (2 mg) by mouth 2 times daily     rivaroxaban ANTICOAGULANT (XARELTO ANTICOAGULANT) 20 MG TABS tablet Take 1 tablet (20 mg) by mouth daily (with dinner)     sodium bicarbonate 650 MG tablet Take 2 tablets (1,300 mg) by mouth 2 times daily     sulfamethoxazole-trimethoprim (BACTRIM/SEPTRA) 400-80 MG per tablet Take 1 tablet by mouth Every Mon, Wed, Fri Morning     venlafaxine (EFFEXOR) 75 MG tablet Take 150 mg by mouth every morning      No current facility-administered medications for this visit.        Allergies:  Allergies   Allergen Reactions     Wellbutrin [Bupropion] Other (See Comments)     Pt. Reports that it made her more weird       ROS:  A 14 point ROS is negative except as stated in the HPI    Objective:  She sounds well today with no cough or shortness of breath.  Her affect and speech are normal.     Labs:  None today    Imaging:  No new imaging    Assessment:  In summary, Delilah Fountain is a 54 year old woman with a history of a surgically provoked DVT about 20 years ago with an  indwelling IVC filter, and now recurrent pulmonary embolism that appears to be unprovoked.  Her risk factors for this thrombotic event include BMI greater than 30 and presence of an IVC filter.  She has completed 6 months of treatment and is uncertain whether she feels comfortable coming off anticoagluation at this point.  Her HERDOO2 score is 1 for BMI > 30, but D Dimer has not been done and she would like this value to help with her risk assessment.  Hence she will stop warfarin and get a D Dimer checked after at least 1 month.    Plan:  1. Majority of today's visit was spent counseling the patient regarding assessment of her risk for recurrence based on her HERDOO2 score.  2. Stop warfarin for at least 1 month and then check D Dimer with PCP  3. She will call us once she has the testing done so we can discuss whether she wants to resume warfarin for secondary prophylaxis.    The patient is given our center's contact information and is instructed to call if she should have any further questions or concerns.  Otherwise, we will plan on seeing her back after her D-dimer testing is complete.      Desmond Blanco MD   of Medicine  [facility]     Total telephone time: 11 minutes

## 2020-09-17 ENCOUNTER — DOCUMENTATION ONLY (OUTPATIENT)
Dept: HEMATOLOGY | Facility: CLINIC | Age: 55
End: 2020-09-17

## 2020-09-17 ENCOUNTER — TELEPHONE (OUTPATIENT)
Dept: TRANSPLANT | Facility: CLINIC | Age: 55
End: 2020-09-17

## 2020-09-17 NOTE — TELEPHONE ENCOUNTER
ISSUE:   SPK from 7/2000.   Elevated glucose, 135.   No A1C since 2018 in chart.     PLAN:   Call patient to discuss. Is she checking her glucose at home? Did she get A1c drawn with other labs as ordered? Any s/s of elevated glucose? Has her weight changed?     OUTCOME:   Attempted to call patient to discuss. No answer. Left vm to call [facility] office back.

## 2020-09-17 NOTE — PROGRESS NOTES
Lab orders for D Dimer signed by Dr. Blanco and faxed to primary provider office at [facility] FAx [phone].  Labs to be drawn mid December.    Ivana Nunn RN - Nurse Clinician - [facility] - [phone]

## 2020-09-18 ENCOUNTER — TELEPHONE (OUTPATIENT)
Dept: TRANSPLANT | Facility: CLINIC | Age: 55
End: 2020-09-18

## 2020-09-18 NOTE — TELEPHONE ENCOUNTER
"Delilah reports she has been gaining weight for awhile. Depression has gotten worse. Not working staring at the walls. Endorses she has been eating \"heavier on carbs\" and \"eating more fatty food.\" We discussed diet choices and will collect Hemoglobin A1C on next set of monthly labs. Asked that we resend her orders to [facility]; orders resent.     She recently received some updated immunizations at her clinic on 9/9/20.    Looking for recommendations on work again. Boyfriend is positive for COVID antibodies, supposed to go back to work in October; he is a . She is social distancing from others, wearing mask, washing hands frequently. Discussed avoiding large groups of people. 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   "

## 2020-09-21 ENCOUNTER — TRANSFERRED RECORDS (OUTPATIENT)
Dept: HEALTH INFORMATION MANAGEMENT | Facility: CLINIC | Age: 55
End: 2020-09-21

## 2020-09-21 DIAGNOSIS — Z94.0 KIDNEY TRANSPLANTED: Primary | ICD-10-CM

## 2020-09-21 RX ORDER — TACROLIMUS 1 MG/1
2 CAPSULE, GELATIN COATED ORAL 2 TIMES DAILY
Qty: 120 CAPSULE | Refills: 11 | Status: SHIPPED | OUTPATIENT
Start: 2020-09-21 | End: 2021-10-08

## 2020-10-07 ENCOUNTER — TELEPHONE (OUTPATIENT)
Dept: TRANSPLANT | Facility: CLINIC | Age: 55
End: 2020-10-07

## 2020-10-07 NOTE — LETTER
PHYSICIAN ORDERS      DATE & TIME ISSUED: 2020 8:55 AM  PATIENT NAME: Delilah Fountain   : 1965     [facility] MR# [if applicable]: [id number]     DIAGNOSIS:  Kidney Transplanted  ICD-10 CODE: Z94.0     Please present this order to lab department to have the following lab specimen collected Approximately 2020:  - Urinalysis with reflex to micro  - Urine Culture    Any questions please call: [phone].  Please fax results to:  [phone]       Will Crespo MD

## 2020-10-07 NOTE — TELEPHONE ENCOUNTER
PLAN:  Message  Received: 1 week ago  Message Contents   Will Crespo MD Blaisdell, Alem Aaron, RN             Needs to repeat UA with culture      OUTCOME: RNCC left VM message for Delilah asking she add UA/UC to her labs this month; due ~Oct 9, 2020 for monthly labs. Orders sent to [facility]

## 2020-10-12 ENCOUNTER — TELEPHONE (OUTPATIENT)
Dept: HEMATOLOGY | Facility: CLINIC | Age: 55
End: 2020-10-12

## 2020-10-12 DIAGNOSIS — I26.99 OTHER ACUTE PULMONARY EMBOLISM WITHOUT ACUTE COR PULMONALE (H): ICD-10-CM

## 2020-10-12 NOTE — TELEPHONE ENCOUNTER
RN received notification from [facility] pharmacy today that Delilah called looking for a refill of her Xarelto 10 mg every day. She switched to this medication from warfarin in June of 2020.     RN called Delilah as the previous plan in the chart was that Delilah would trial going off of the Xarelto for 1 month and have a d. Dimer checked.     Per Delilah she went off of the medication for 2 weeks and felt increasingly short of breath, and anxious. She would like to remain on Xarelto while working on her personal weight loss goals.     RN told Delilah that she would review her case with Dr. Blanco but per previous notes it seems as though he is comfortable with her maintaining the long term anticoagulation.     If Dr. Blanco is okay with this we will refill her for 1 year and if she wants to try going off of the medication again she should call us. She was happy with this plan and has our call back number for any further questions, comments or concerns.     Callie Tinsley, MSN, RN, PHN - Nurse Clinician - [facility] - [phone]

## 2020-10-13 ENCOUNTER — DOCUMENTATION ONLY (OUTPATIENT)
Dept: CARE COORDINATION | Facility: CLINIC | Age: 55
End: 2020-10-13

## 2020-10-14 DIAGNOSIS — I26.99 OTHER ACUTE PULMONARY EMBOLISM WITHOUT ACUTE COR PULMONALE (H): ICD-10-CM

## 2020-10-29 ENCOUNTER — VIRTUAL VISIT (OUTPATIENT)
Dept: NEPHROLOGY | Facility: CLINIC | Age: 55
End: 2020-10-29
Attending: INTERNAL MEDICINE
Payer: COMMERCIAL

## 2020-10-29 DIAGNOSIS — I26.09 OTHER ACUTE PULMONARY EMBOLISM WITH ACUTE COR PULMONALE (H): ICD-10-CM

## 2020-10-29 DIAGNOSIS — Z48.298 AFTERCARE FOLLOWING ORGAN TRANSPLANT: Primary | ICD-10-CM

## 2020-10-29 DIAGNOSIS — R73.01 ELEVATED FASTING GLUCOSE: ICD-10-CM

## 2020-10-29 PROCEDURE — 99215 OFFICE O/P EST HI 40 MIN: CPT | Mod: GT

## 2020-10-29 ASSESSMENT — PAIN SCALES - GENERAL: PAINLEVEL: NO PAIN (0)

## 2020-10-29 NOTE — PROGRESS NOTES
"Delilah Fountain is a 55 year old female who is being evaluated via a billable video visit.      The patient has been notified of following:     \"This video visit will be conducted via a call between you and your physician/provider. We have found that certain health care needs can be provided without the need for an in-person physical exam.  This service lets us provide the care you need with a video conversation.  If a prescription is necessary we can send it directly to your pharmacy.  If lab work is needed we can place an order for that and you can then stop by our lab to have the test done at a later time.    Video visits are billed at different rates depending on your insurance coverage.  Please reach out to your insurance provider with any questions.    If during the course of the call the physician/provider feels a video visit is not appropriate, you will not be charged for this service.\"    Patient has given verbal consent for Video visit? Yes  How would you like to obtain your AVS? Mail a copy  If you are dropped from the video visit, the video invite should be resent to: Send to e-mail at: [email or web address]  Will anyone else be joining your video visit? No        Video-Visit Details    Type of service:  Video Visit    Originating Location (pt. Location): Home    Distant Location (provider location):  [facility] [place]     Platform used for Video Visit: Galo Crespo MD        "

## 2020-10-29 NOTE — LETTER
"10/29/2020       RE: Delilah Fountain  [address]  [facility] [address]     Dear Colleague,    Thank you for referring your patient, Delilah Fountain, to the [facility]PH[facility]OLOG[facility] [place] at [facility]. Please see a copy of my visit note below.    Delilah Fountain is a 55 year old female who is being evaluated via a billable video visit.      The patient has been notified of following:     \"This video visit will be conducted via a call between you and your physician/provider. We have found that certain health care needs can be provided without the need for an in-person physical exam.  This service lets us provide the care you need with a video conversation.  If a prescription is necessary we can send it directly to your pharmacy.  If lab work is needed we can place an order for that and you can then stop by our lab to have the test done at a later time.    Video visits are billed at different rates depending on your insurance coverage.  Please reach out to your insurance provider with any questions.    If during the course of the call the physician/provider feels a video visit is not appropriate, you will not be charged for this service.\"    Patient has given verbal consent for Video visit? Yes  How would you like to obtain your AVS? Mail a copy  If you are dropped from the video visit, the video invite should be resent to: Send to e-mail at: [email or web address]  Will anyone else be joining your video visit? No        Video-Visit Details    Type of service:  Video Visit    Originating Location (pt. Location): Home    Distant Location (provider location):  [facility] [place]     Platform used for Video Visit: Galo Crespo MD          TRANSPLANT NEPHROLOGY TELEMEDICINE VISIT    Delilah Fountain is a 55 year old female who is being evaluated via a billable telemedicine (video/telephone) visit " "on October 29, 2020.     The patient has been notified of following:     \"This telemedicine (video/telephone) visit will be conducted via a video/phone call between you and your physician. We have found that certain health care needs can be provided without the need for a physical exam.  This service lets us provide the care you need with a short video/phone conversation.  If a prescription is necessary, we can send it directly to your pharmacy.  If lab work is needed, we can place an order for that and you can then stop by our lab to have the test done at a later time.    If during the course of this video/phone call the physician feels a telemedicine (video/telephone) visit is not appropriate, you will not be charged for this service and an in clinic visit will be arranged at a later date.\"     Assessment & Plan   # DDKT (SPK): Stable   - Baseline Cr ~ 0.7 mg/dL    - Proteinuria: Normal (<0.2 grams)   - Date DSA Last Checked: 2017      Latest DSA: No   - BK Viremia: No   - Kidney Tx Biopsy: No    # Pancreas Tx (SPK):    - Pancreatic Exocrine Drainage: Enteric drained     - Blood glucose: Euglycemia    But occasionally elevated fasting sugar.   On insulin: No   - HbA1c: Stable      Latest HbA1c: 5.6%   - Pancreatic enzymes: Stable   - Date DSA Last Checked: 2017  Latest DSA: No   - Pancreas Tx Biopsy: No    # Immunosuppression: Tacrolimus immediate release (goal 5-8) and Azathioprine (dose 150 mg daily)   - Changes: No    # Infection Prophylaxis:   - PJP: Sulfa/TMP (Bactrim)    # Hypertension: Controlled;  Goal BP: < 130/80   - Changes: No    # Elevated Blood Glucose: Glucose generally running ~ low 100s   - Management as per primary team.    # DVT recurrent with history of PE: on Rivaroxaban, due to recurrent DVTs and PE, it seems reasonable to remain on anticoagulation.     # Depression and anxiety: follow with her PCP, no SI     # SOB: has not completely resolved since the PE and does not follow specific " pattern. Will refer to pulmonary.     # Skin Cancer Risk:    - Discussed sun protection and recommend regular follow up with Dermatology.    # Medical Compliance: Yes    # COVID-19 Virus Review: Discussed COVID-19 virus and the potential medical risks.  Reviewed preventative health recommendations, which includes washing hands for 20 seconds, avoid touching your face, and social distancing.  Asked patient to inform the transplant center if they are exposed or diagnosed with this virus.    # Transplant History:  Etiology of Kidney Failure: Diabetes mellitus type 1  Tx: DDKT (SPK)  Significant changes in immunosuppression: on Aza/Tac   Significant transplant-related complications: None    Transplant Office Phone Number: [phone]    Assessment and plan was discussed with the patient and he voiced his understanding and agreement.    Return Visit: 6 months       Delilah Fountain has a clinical telephone visit for routine follow up and immunosuppression management.    History of Present Illness   Overall ding ok but has been working on her coping with the current pandemic stressors. She is following social distancing. Has SOB on and off since her recent diagnosis with blood clots and PE in 2/2020. She had history of blood clot after transplantation too. She scheduled to come of anticoagulation but elected to remain which is very reasonable. She is following with hematology. We discussed a referral to pulmonary for the SOB. She had cardiac work up at the time of the PE admission and was reportedly unremarkable.       Recent Hospitalizations:  [] No [x] Yes    New Medical Issues: [] No [x] Yes    Decreased energy: [] No [x] Yes    Chest pain or SOB with exertion:  [x] No [] Yes  on and off    Appetite change or weight change: [] No [x] Yes    Nausea, vomiting or diarrhea:  [x] No [] Yes    Fever, sweats or chills: [x] No [] Yes    Leg swelling: [] No [x] Yes      Home BP: not checked recently     Review of Systems    A comprehensive review of systems was obtained and negative, except as noted in the HPI or PMH.    I have reviewed and updated the patient's Past Medical History, Social History, and Medication List.    Active Medical Problems  Patient Active Problem List   Diagnosis     Hernia of abdominal cavity, with obstruction     Hernia of abdominal wall     Knee pain, bilateral     Hip pain     HTN (hypertension)     Allergies  Wellbutrin [bupropion]    Medications  Current Outpatient Medications   Medication Sig     atorvastatin (LIPITOR) 10 MG tablet Take 1 tablet (10 mg) by mouth daily     azaTHIOprine (IMURAN) 50 MG tablet Take 3 tablets (150 mg) by mouth daily     calcium carbonate (OS-RENATO 500 MG [place]. [place]) 500 MG tablet Take 500 mg by mouth 2 times daily     Cetirizine HCl (ZYRTEC ALLERGY PO) Take 5 mg by mouth 2 times daily     cholecalciferol (VITAMIN D3) 1000 units (25 mcg) capsule Take 1 capsule by mouth daily.     order for DME AIRSENSE 10  10-15CM/H20  PILLOWS CHRISTIANSON FX FOR HER     PROGRAF (BRAND) 1 MG capsule Take 2 capsules (2 mg) by mouth 2 times daily     rivaroxaban ANTICOAGULANT (XARELTO ANTICOAGULANT) 20 MG TABS tablet Take 1 tablet (20 mg) by mouth daily (with dinner)     sodium bicarbonate 650 MG tablet Take 2 tablets (1,300 mg) by mouth 2 times daily     sulfamethoxazole-trimethoprim (BACTRIM/SEPTRA) 400-80 MG per tablet Take 1 tablet by mouth Every Mon, Wed, Fri Morning     venlafaxine (EFFEXOR) 75 MG tablet Take 150 mg by mouth every morning      No current facility-administered medications for this visit.        Phone call contact time:  45 min     Will Crespo MD         Again, thank you for allowing me to participate in the care of your patient.      Sincerely,    Kidney/Pancreas Recipient

## 2020-10-29 NOTE — PROGRESS NOTES
"TRANSPLANT NEPHROLOGY TELEMEDICINE VISIT    Delilah Fountain is a 55 year old female who is being evaluated via a billable telemedicine (video/telephone) visit on October 29, 2020.     The patient has been notified of following:     \"This telemedicine (video/telephone) visit will be conducted via a video/phone call between you and your physician. We have found that certain health care needs can be provided without the need for a physical exam.  This service lets us provide the care you need with a short video/phone conversation.  If a prescription is necessary, we can send it directly to your pharmacy.  If lab work is needed, we can place an order for that and you can then stop by our lab to have the test done at a later time.    If during the course of this video/phone call the physician feels a telemedicine (video/telephone) visit is not appropriate, you will not be charged for this service and an in clinic visit will be arranged at a later date.\"     Assessment & Plan   # DDKT (SPK): Stable   - Baseline Cr ~ 0.7 mg/dL    - Proteinuria: Normal (<0.2 grams)   - Date DSA Last Checked: 2017      Latest DSA: No   - BK Viremia: No   - Kidney Tx Biopsy: No    # Pancreas Tx (SPK):    - Pancreatic Exocrine Drainage: Enteric drained     - Blood glucose: Euglycemia    But occasionally elevated fasting sugar.   On insulin: No   - HbA1c: Stable      Latest HbA1c: 5.6%   - Pancreatic enzymes: Stable   - Date DSA Last Checked: 2017  Latest DSA: No   - Pancreas Tx Biopsy: No    # Immunosuppression: Tacrolimus immediate release (goal 5-8) and Azathioprine (dose 150 mg daily)   - Changes: No    # Infection Prophylaxis:   - PJP: Sulfa/TMP (Bactrim)    # Hypertension: Controlled;  Goal BP: < 130/80   - Changes: No    # Elevated Blood Glucose: Glucose generally running ~ low 100s   - Management as per primary team.    # DVT recurrent with history of PE: on Rivaroxaban, due to recurrent DVTs and PE, it seems reasonable to remain " on anticoagulation.     # Depression and anxiety: follow with her PCP, no SI     # SOB: has not completely resolved since the PE and does not follow specific pattern. Will refer to pulmonary.     # Skin Cancer Risk:    - Discussed sun protection and recommend regular follow up with Dermatology.    # Medical Compliance: Yes    # COVID-19 Virus Review: Discussed COVID-19 virus and the potential medical risks.  Reviewed preventative health recommendations, which includes washing hands for 20 seconds, avoid touching your face, and social distancing.  Asked patient to inform the transplant center if they are exposed or diagnosed with this virus.    # Transplant History:  Etiology of Kidney Failure: Diabetes mellitus type 1  Tx: DDKT (SPK)  Significant changes in immunosuppression: on Aza/Tac   Significant transplant-related complications: None    Transplant Office Phone Number: [phone]    Assessment and plan was discussed with the patient and he voiced his understanding and agreement.    Return Visit: 6 months       Delilah Fountain has a clinical telephone visit for routine follow up and immunosuppression management.    History of Present Illness   Overall ding ok but has been working on her coping with the current pandemic stressors. She is following social distancing. Has SOB on and off since her recent diagnosis with blood clots and PE in 2/2020. She had history of blood clot after transplantation too. She scheduled to come of anticoagulation but elected to remain which is very reasonable. She is following with hematology. We discussed a referral to pulmonary for the SOB. She had cardiac work up at the time of the PE admission and was reportedly unremarkable.       Recent Hospitalizations:  [] No [x] Yes    New Medical Issues: [] No [x] Yes    Decreased energy: [] No [x] Yes    Chest pain or SOB with exertion:  [x] No [] Yes  on and off    Appetite change or weight change: [] No [x] Yes    Nausea, vomiting  or diarrhea:  [x] No [] Yes    Fever, sweats or chills: [x] No [] Yes    Leg swelling: [] No [x] Yes      Home BP: not checked recently     Review of Systems   A comprehensive review of systems was obtained and negative, except as noted in the HPI or PMH.    I have reviewed and updated the patient's Past Medical History, Social History, and Medication List.    Active Medical Problems  Patient Active Problem List   Diagnosis     Hernia of abdominal cavity, with obstruction     Hernia of abdominal wall     Knee pain, bilateral     Hip pain     HTN (hypertension)     Allergies  Wellbutrin [bupropion]    Medications  Current Outpatient Medications   Medication Sig     atorvastatin (LIPITOR) 10 MG tablet Take 1 tablet (10 mg) by mouth daily     azaTHIOprine (IMURAN) 50 MG tablet Take 3 tablets (150 mg) by mouth daily     calcium carbonate (OS-RENATO 500 MG [place]. [place]) 500 MG tablet Take 500 mg by mouth 2 times daily     Cetirizine HCl (ZYRTEC ALLERGY PO) Take 5 mg by mouth 2 times daily     cholecalciferol (VITAMIN D3) 1000 units (25 mcg) capsule Take 1 capsule by mouth daily.     order for DME AIRSENSE 10  10-15CM/H20  PILLOWS CHRISTIANSON FX FOR HER     PROGRAF (BRAND) 1 MG capsule Take 2 capsules (2 mg) by mouth 2 times daily     rivaroxaban ANTICOAGULANT (XARELTO ANTICOAGULANT) 20 MG TABS tablet Take 1 tablet (20 mg) by mouth daily (with dinner)     sodium bicarbonate 650 MG tablet Take 2 tablets (1,300 mg) by mouth 2 times daily     sulfamethoxazole-trimethoprim (BACTRIM/SEPTRA) 400-80 MG per tablet Take 1 tablet by mouth Every Mon, Wed, Fri Morning     venlafaxine (EFFEXOR) 75 MG tablet Take 150 mg by mouth every morning      No current facility-administered medications for this visit.        Phone call contact time:  45 min     Will Crespo MD

## 2020-10-30 ENCOUNTER — TELEPHONE (OUTPATIENT)
Dept: TRANSPLANT | Facility: CLINIC | Age: 55
End: 2020-10-30

## 2020-10-30 NOTE — TELEPHONE ENCOUNTER
Received in-basket message from transplant nephrologist requesting this writer call pt regarding increase in depression and mental health resources. Left pt voicemail to return this writer's call.     Faustina Diaz, [facility]    Kidney/Pancreas/[facility] Transplant Programs

## 2020-11-11 ENCOUNTER — TELEPHONE (OUTPATIENT)
Dept: TRANSPLANT | Facility: CLINIC | Age: 55
End: 2020-11-11

## 2020-11-11 NOTE — TELEPHONE ENCOUNTER
Patient Call: Voicemail  Date/Time: 11/11/20 103pm  Reason for call: Patient left a voicemail requesting a call back. 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 Her coworker plans to get tested and they wear masks in the workplace, also her coworker did not have real close contact with the nephew. She would like to know our recommendations.

## 2020-11-12 NOTE — TELEPHONE ENCOUNTER
RNCC returned call to Delilah. She returned to work after discussing with the provider via video visit 10/29/20. She was advised to wear N95 mask and face shield. She states there is plexiglass at work as well.     Her co-worker has 12 year old nephew who was at her home over the weekend. Coworker was instructed to leave work and isolate for 2 weeks. So Delilah had indirect exposure. She is not required to quarantine. Recommended she still monitor for symptoms and if coworker becomes positive be test. Testing would not be needed at this time. If symptoms develop, contact PCP office and notify transplant for possible IS reduction.

## 2020-12-16 DIAGNOSIS — Z94.0 KIDNEY TRANSPLANTED: ICD-10-CM

## 2020-12-16 DIAGNOSIS — Z94.83 PANCREAS REPLACED BY TRANSPLANT (H): ICD-10-CM

## 2020-12-16 RX ORDER — AZATHIOPRINE 50 MG/1
150 TABLET ORAL DAILY
Qty: 90 TABLET | Refills: 1 | Status: SHIPPED | OUTPATIENT
Start: 2020-12-16 | End: 2021-02-09

## 2021-01-14 ENCOUNTER — HEALTH MAINTENANCE LETTER (OUTPATIENT)
Age: 56
End: 2021-01-14

## 2021-02-09 DIAGNOSIS — Z94.0 KIDNEY TRANSPLANTED: ICD-10-CM

## 2021-02-09 DIAGNOSIS — Z94.83 PANCREAS REPLACED BY TRANSPLANT (H): ICD-10-CM

## 2021-02-09 RX ORDER — AZATHIOPRINE 50 MG/1
150 TABLET ORAL DAILY
Qty: 90 TABLET | Refills: 1 | Status: SHIPPED | OUTPATIENT
Start: 2021-02-09 | End: 2021-04-05

## 2021-03-18 ENCOUNTER — IMMUNIZATION (OUTPATIENT)
Dept: NURSING | Facility: CLINIC | Age: 56
End: 2021-03-18
Payer: COMMERCIAL

## 2021-03-18 PROCEDURE — 0001A PR COVID VAC PFIZER DIL RECON 30 MCG/0.3 ML IM: CPT

## 2021-03-18 PROCEDURE — 91300 PR COVID VAC PFIZER DIL RECON 30 MCG/0.3 ML IM: CPT

## 2021-04-02 DIAGNOSIS — Z94.0 KIDNEY TRANSPLANTED: ICD-10-CM

## 2021-04-02 DIAGNOSIS — Z94.83 PANCREAS REPLACED BY TRANSPLANT (H): ICD-10-CM

## 2021-04-05 RX ORDER — AZATHIOPRINE 50 MG/1
150 TABLET ORAL DAILY
Qty: 90 TABLET | Refills: 1 | Status: SHIPPED | OUTPATIENT
Start: 2021-04-05 | End: 2021-07-08

## 2021-04-08 ENCOUNTER — IMMUNIZATION (OUTPATIENT)
Dept: NURSING | Facility: CLINIC | Age: 56
End: 2021-04-08
Attending: PSYCHIATRY & NEUROLOGY
Payer: COMMERCIAL

## 2021-04-08 PROCEDURE — 91300 PR COVID VAC PFIZER DIL RECON 30 MCG/0.3 ML IM: CPT

## 2021-04-08 PROCEDURE — 0002A PR COVID VAC PFIZER DIL RECON 30 MCG/0.3 ML IM: CPT

## 2021-05-24 ENCOUNTER — RECORDS - HEALTHEAST (OUTPATIENT)
Dept: ADMINISTRATIVE | Facility: CLINIC | Age: 56
End: 2021-05-24

## 2021-05-26 DIAGNOSIS — I26.99 OTHER ACUTE PULMONARY EMBOLISM WITHOUT ACUTE COR PULMONALE (H): Primary | ICD-10-CM

## 2021-06-28 ENCOUNTER — TELEPHONE (OUTPATIENT)
Dept: HEMATOLOGY | Facility: CLINIC | Age: 56
End: 2021-06-28

## 2021-06-30 NOTE — TELEPHONE ENCOUNTER
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    She reports some episodic shortness of breath that she is unsure if related to deconditioning from lack of activity while isolated during COVID.  She says she has SOB with walking, doing laundry, shopping - activites which she can still do but occasionally she needs to stop and catch her break.  She does feel like she needs to cough more frequently to clear her lungs. She denies CP.  She is wondering about if she should be concerned about PE.    We agreed that no action is needed today.  Case to be reviewed with Dr. Blanco and appt was made for a virtual visit with a PA on 7.19.2021.    Ivana Nunn RN - Nurse Clinician - [facility] - [phone]    Left message for Delilah Fountain at 2 pm pm 6/30/2021, to get blood draw and Echocardiogram scheduled.  Asked her to call back.    Ivana Nunn RN - Nurse Clinician - [facility] - [phone]

## 2021-07-08 DIAGNOSIS — Z94.83 PANCREAS REPLACED BY TRANSPLANT (H): ICD-10-CM

## 2021-07-08 DIAGNOSIS — Z94.0 KIDNEY TRANSPLANTED: Primary | ICD-10-CM

## 2021-07-08 RX ORDER — AZATHIOPRINE 50 MG/1
150 TABLET ORAL DAILY
Qty: 90 TABLET | Refills: 0 | Status: SHIPPED | OUTPATIENT
Start: 2021-07-08 | End: 2021-08-16

## 2021-07-12 ENCOUNTER — TELEPHONE (OUTPATIENT)
Dept: NEPHROLOGY | Facility: CLINIC | Age: 56
End: 2021-07-12

## 2021-07-12 NOTE — TELEPHONE ENCOUNTER
Prior Authorization Approval    Authorization Effective Date: 6/8/2021  Authorization Expiration Date: 7/8/2022  Medication: Prograf  Approved Dose/Quantity: 120  Reference #:     Insurance Company: [employer] - Phone [phone] Fax [phone]  Expected CoPay:       CoPay Card Available:      Foundation Assistance Needed:    Which Pharmacy is filling the prescription (Not needed for infusion/clinic administered): [facility] - [place], [place]  [facility] [address]  Pharmacy Notified: Yes  Patient Notified: Yes

## 2021-07-19 ENCOUNTER — VIRTUAL VISIT (OUTPATIENT)
Dept: HEMATOLOGY | Facility: CLINIC | Age: 56
End: 2021-07-19
Attending: PHYSICIAN ASSISTANT
Payer: COMMERCIAL

## 2021-07-19 DIAGNOSIS — I26.99 OTHER ACUTE PULMONARY EMBOLISM WITHOUT ACUTE COR PULMONALE (H): Primary | ICD-10-CM

## 2021-07-19 PROCEDURE — 99213 OFFICE O/P EST LOW 20 MIN: CPT | Mod: TEL | Performed by: PHYSICIAN ASSISTANT

## 2021-07-20 NOTE — PROGRESS NOTES
[facility]  Center for Bleeding [facility] Clotting Disorders  [facility] [address], [address], [place] [address]  Main: [phone], Fax: [phone]        Patient: Delilah Fountain  MRN: [id number]  : 1965  DONALDO: 2021   Phone visit.  Patient at home, provider in clinic.  Patient changed from in person to phone visit at time of visit.     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  She was last seen by telephone in 2020. Sometime after this visit, given her ongoing concerns for recurrent clotting she was started on Xarelto.  Notes are unclear on exact timing and Delilah herself cannot recall.  She reports she has been on 20mg Xarelto for close to a year.        Recently she has noted more SOB with walking and other activities of daily living.  She notes she is over 300lbs and that movement does cause her exertion.  She has been trying to lose weight but is also struggling with depression.  She reports that a few weeks ago she had menstrual bleeding x 5 days.  This occurred after years of no bleeding.  She has a primary care provider/endocrinologist outside this system.  She has not followed-up for this issue yet.      She denies other bleeding symptoms.  No increase in leg swelling but has some at baseline.     Past Medical History:  Past Medical History:   Diagnosis Date     Allergic rhinitis      Anxiety      Gastro-oesophageal reflux disease      Sleep apnea     uses cpap machine       Medications:  Current Outpatient Medications   Medication Sig     atorvastatin (LIPITOR)  10 MG tablet Take 1 tablet (10 mg) by mouth daily     azaTHIOprine (IMURAN) 50 MG tablet Take 3 tablets (150 mg) by mouth daily     calcium carbonate (OS-RENATO 500 MG [place]. [place]) 500 MG tablet Take 500 mg by mouth 2 times daily     Cetirizine HCl (ZYRTEC ALLERGY PO) Take 5 mg by mouth 2 times daily     cholecalciferol (VITAMIN D3) 1000 units (25 mcg) capsule Take 1 capsule by mouth daily.     order for DME AIRSENSE 10  10-15CM/H20  PILLOWS CHRISTIANSON FX FOR HER     PROGRAF (BRAND) 1 MG capsule Take 2 capsules (2 mg) by mouth 2 times daily     rivaroxaban ANTICOAGULANT (XARELTO ANTICOAGULANT) 20 MG TABS tablet Take 1 tablet (20 mg) by mouth daily (with dinner)     sodium bicarbonate 650 MG tablet Take 2 tablets (1,300 mg) by mouth 2 times daily     sulfamethoxazole-trimethoprim (BACTRIM/SEPTRA) 400-80 MG per tablet Take 1 tablet by mouth Every Mon, Wed, Fri Morning     venlafaxine (EFFEXOR) 75 MG tablet Take 150 mg by mouth every morning      No current facility-administered medications for this visit.       Allergies:  Allergies   Allergen Reactions     Wellbutrin [Bupropion] Other (See Comments)     Pt. Reports that it made her more weird       ROS:  A 14 point ROS is negative except as stated in the HPI    Objective:  Phone visit done today    Labs:  None today. Have ordered a Xarelto level to be done in the next few weeks.    Imaging:  No new imaging    Assessment:  In summary, Delilah Fountain is a 55 year old woman with a history of a surgically provoked DVT about 20 years ago with an indwelling IVC filter, and recurrent pulmonary embolism that appears to be unprovoked.  Her risk factors for this thrombotic event include BMI greater than 30 and presence of an IVC filter.  She has completed 6 months of treatment and attempted to come off anticoagulation but restarted due to her concern of recurrent clotting manifested as SOB symptoms which persist.  She is current taking Xarelto 20mg daily.  On this regimen she still  worries about recurrent clotting as she has SOB with most activity.  No chest pain, no increase in leg swelling. She is over 140kg in weight at last check.     Plan:  1. Continue current dosing of Xarelto (20mg daily) but will check a peak level in the next few weeks to assure adequate absorption.  2. Follow-up with her endocrinologist/primary care for her recent menstrual bleeding post menopause.  I am also concerned about her overall health and weight.  She appears to have worsening depression and anxiety symptoms which are making it hard for her to take care of herself.  I cannot see her outside clinic notes.   3. Follow-up in person with Dr. Blanco in 6 months. She was last seen in person 6/2020.          Sarah Gomez MPH, PA-C  [facility]  Center for Bleeding [facility] Clotting Disorders  [phone] main line  [phone] pager  [phone] fax

## 2021-08-16 DIAGNOSIS — Z94.0 KIDNEY TRANSPLANTED: Primary | ICD-10-CM

## 2021-08-16 DIAGNOSIS — Z94.83 PANCREAS REPLACED BY TRANSPLANT (H): ICD-10-CM

## 2021-08-16 RX ORDER — AZATHIOPRINE 50 MG/1
150 TABLET ORAL DAILY
Qty: 90 TABLET | Refills: 0 | Status: SHIPPED | OUTPATIENT
Start: 2021-08-16 | End: 2021-09-10

## 2021-09-10 DIAGNOSIS — Z94.83 PANCREAS REPLACED BY TRANSPLANT (H): ICD-10-CM

## 2021-09-10 DIAGNOSIS — Z94.0 KIDNEY TRANSPLANTED: ICD-10-CM

## 2021-09-10 RX ORDER — AZATHIOPRINE 50 MG/1
150 TABLET ORAL DAILY
Qty: 90 TABLET | Refills: 0 | Status: SHIPPED | OUTPATIENT
Start: 2021-09-10 | End: 2021-10-08

## 2021-10-04 DIAGNOSIS — I26.99 OTHER ACUTE PULMONARY EMBOLISM WITHOUT ACUTE COR PULMONALE (H): ICD-10-CM

## 2021-10-08 DIAGNOSIS — Z94.0 KIDNEY TRANSPLANTED: ICD-10-CM

## 2021-10-08 DIAGNOSIS — Z94.83 PANCREAS REPLACED BY TRANSPLANT (H): ICD-10-CM

## 2021-10-08 RX ORDER — TACROLIMUS 1 MG/1
2 CAPSULE, GELATIN COATED ORAL 2 TIMES DAILY
Qty: 120 CAPSULE | Refills: 11 | Status: SHIPPED | OUTPATIENT
Start: 2021-10-08 | End: 2022-11-08

## 2021-10-08 RX ORDER — AZATHIOPRINE 50 MG/1
150 TABLET ORAL DAILY
Qty: 90 TABLET | Refills: 0 | Status: SHIPPED | OUTPATIENT
Start: 2021-10-08 | End: 2021-11-24

## 2021-10-23 ENCOUNTER — HEALTH MAINTENANCE LETTER (OUTPATIENT)
Age: 56
End: 2021-10-23

## 2021-11-24 DIAGNOSIS — Z94.0 KIDNEY TRANSPLANTED: ICD-10-CM

## 2021-11-24 DIAGNOSIS — Z94.83 PANCREAS REPLACED BY TRANSPLANT (H): ICD-10-CM

## 2021-11-24 RX ORDER — AZATHIOPRINE 50 MG/1
150 TABLET ORAL DAILY
Qty: 90 TABLET | Refills: 0 | Status: SHIPPED | OUTPATIENT
Start: 2021-11-24 | End: 2021-12-17

## 2021-12-17 DIAGNOSIS — Z94.0 KIDNEY TRANSPLANTED: ICD-10-CM

## 2021-12-17 DIAGNOSIS — Z94.83 PANCREAS REPLACED BY TRANSPLANT (H): ICD-10-CM

## 2021-12-17 RX ORDER — AZATHIOPRINE 50 MG/1
150 TABLET ORAL DAILY
Qty: 90 TABLET | Refills: 0 | Status: SHIPPED | OUTPATIENT
Start: 2021-12-17 | End: 2022-01-26

## 2022-01-26 DIAGNOSIS — Z94.0 KIDNEY TRANSPLANTED: ICD-10-CM

## 2022-01-26 DIAGNOSIS — Z94.83 PANCREAS REPLACED BY TRANSPLANT (H): ICD-10-CM

## 2022-01-26 RX ORDER — AZATHIOPRINE 50 MG/1
150 TABLET ORAL DAILY
Qty: 90 TABLET | Refills: 0 | Status: SHIPPED | OUTPATIENT
Start: 2022-01-26 | End: 2022-02-21

## 2022-01-27 DIAGNOSIS — I26.99 OTHER ACUTE PULMONARY EMBOLISM WITHOUT ACUTE COR PULMONALE (H): ICD-10-CM

## 2022-02-12 ENCOUNTER — HEALTH MAINTENANCE LETTER (OUTPATIENT)
Age: 57
End: 2022-02-12

## 2022-02-21 DIAGNOSIS — Z94.0 KIDNEY TRANSPLANTED: Primary | ICD-10-CM

## 2022-02-21 DIAGNOSIS — Z94.83 PANCREAS REPLACED BY TRANSPLANT (H): ICD-10-CM

## 2022-02-22 RX ORDER — AZATHIOPRINE 50 MG/1
150 TABLET ORAL DAILY
Qty: 90 TABLET | Refills: 0 | Status: SHIPPED | OUTPATIENT
Start: 2022-02-22 | End: 2022-03-18

## 2022-03-02 ENCOUNTER — LAB (OUTPATIENT)
Dept: LAB | Facility: CLINIC | Age: 57
End: 2022-03-02

## 2022-03-02 DIAGNOSIS — Z94.0 KIDNEY TRANSPLANTED: ICD-10-CM

## 2022-03-02 DIAGNOSIS — Z94.83 PANCREAS REPLACED BY TRANSPLANT (H): ICD-10-CM

## 2022-03-02 DIAGNOSIS — Z94.0 KIDNEY REPLACED BY TRANSPLANT: ICD-10-CM

## 2022-03-02 DIAGNOSIS — I26.99 OTHER ACUTE PULMONARY EMBOLISM WITHOUT ACUTE COR PULMONALE (H): ICD-10-CM

## 2022-03-02 LAB
AMYLASE SERPL-CCNC: 57 U/L (ref 30–110)
ANION GAP SERPL CALCULATED.3IONS-SCNC: 9 MMOL/L (ref 3–14)
BUN SERPL-MCNC: 11 MG/DL (ref 7–30)
CALCIUM SERPL-MCNC: 8.5 MG/DL (ref 8.5–10.1)
CHLORIDE BLD-SCNC: 110 MMOL/L (ref 94–109)
CO2 SERPL-SCNC: 23 MMOL/L (ref 20–32)
CREAT SERPL-MCNC: 0.71 MG/DL (ref 0.52–1.04)
ERYTHROCYTE [DISTWIDTH] IN BLOOD BY AUTOMATED COUNT: 14.4 % (ref 10–15)
GFR SERPL CREATININE-BSD FRML MDRD: >90 ML/MIN/1.73M2
GLUCOSE BLD-MCNC: 139 MG/DL (ref 70–99)
HBA1C MFR BLD: 6 % (ref 0–5.6)
HCT VFR BLD AUTO: 44.6 % (ref 35–47)
HGB BLD-MCNC: 14.3 G/DL (ref 11.7–15.7)
LIPASE SERPL-CCNC: 94 U/L (ref 73–393)
MCH RBC QN AUTO: 31.6 PG (ref 26.5–33)
MCHC RBC AUTO-ENTMCNC: 32.1 G/DL (ref 31.5–36.5)
MCV RBC AUTO: 99 FL (ref 78–100)
NT-PROBNP SERPL-MCNC: 158 PG/ML (ref 0–125)
PLATELET # BLD AUTO: 271 10E3/UL (ref 150–450)
POTASSIUM BLD-SCNC: 3.9 MMOL/L (ref 3.4–5.3)
RBC # BLD AUTO: 4.52 10E6/UL (ref 3.8–5.2)
SODIUM SERPL-SCNC: 142 MMOL/L (ref 133–144)
TACROLIMUS BLD-MCNC: 5.7 UG/L (ref 5–15)
TME LAST DOSE: NORMAL H
TME LAST DOSE: NORMAL H
WBC # BLD AUTO: 6.8 10E3/UL (ref 4–11)

## 2022-03-02 PROCEDURE — 83690 ASSAY OF LIPASE: CPT

## 2022-03-02 PROCEDURE — 85027 COMPLETE CBC AUTOMATED: CPT

## 2022-03-02 PROCEDURE — 80197 ASSAY OF TACROLIMUS: CPT

## 2022-03-02 PROCEDURE — 83880 ASSAY OF NATRIURETIC PEPTIDE: CPT

## 2022-03-02 PROCEDURE — 80061 LIPID PANEL: CPT

## 2022-03-02 PROCEDURE — 80048 BASIC METABOLIC PNL TOTAL CA: CPT

## 2022-03-02 PROCEDURE — 80299 QUANTITATIVE ASSAY DRUG: CPT

## 2022-03-02 PROCEDURE — 36415 COLL VENOUS BLD VENIPUNCTURE: CPT

## 2022-03-02 PROCEDURE — 83036 HEMOGLOBIN GLYCOSYLATED A1C: CPT

## 2022-03-02 PROCEDURE — 82150 ASSAY OF AMYLASE: CPT

## 2022-03-03 ENCOUNTER — OFFICE VISIT (OUTPATIENT)
Dept: NEPHROLOGY | Facility: CLINIC | Age: 57
End: 2022-03-03
Attending: INTERNAL MEDICINE
Payer: COMMERCIAL

## 2022-03-03 VITALS
TEMPERATURE: 98.4 F | WEIGHT: 293 LBS | OXYGEN SATURATION: 97 % | DIASTOLIC BLOOD PRESSURE: 92 MMHG | BODY MASS INDEX: 46.6 KG/M2 | SYSTOLIC BLOOD PRESSURE: 158 MMHG | HEART RATE: 91 BPM

## 2022-03-03 DIAGNOSIS — E66.01 MORBID OBESITY (H): ICD-10-CM

## 2022-03-03 DIAGNOSIS — Z48.298 AFTERCARE FOLLOWING ORGAN TRANSPLANT: Primary | ICD-10-CM

## 2022-03-03 LAB — RIVAROXABAN PPP CHRO-MCNC: 30 NG/ML

## 2022-03-03 PROCEDURE — G0463 HOSPITAL OUTPT CLINIC VISIT: HCPCS

## 2022-03-03 PROCEDURE — 99214 OFFICE O/P EST MOD 30 MIN: CPT | Mod: GC | Performed by: INTERNAL MEDICINE

## 2022-03-03 ASSESSMENT — PATIENT HEALTH QUESTIONNAIRE - PHQ9: SUM OF ALL RESPONSES TO PHQ QUESTIONS 1-9: 18

## 2022-03-03 ASSESSMENT — PAIN SCALES - GENERAL: PAINLEVEL: NO PAIN (0)

## 2022-03-03 NOTE — PROGRESS NOTES
Recommendations  1. Comprehensive weight management  2. Take your covid19 booster shot now  . 2 weeks after that . Check COVID19 spike protein Ab to decide wether she will be a candidate for Evusheld  3.Talk to your PCP to update your age appropriate health screening   - colonoscopy  -mammogram  -pap smear  4.Please see a dermatologist for skin check       Assessment & Plan   # DDKT (SPK): Stable   - Baseline Cr ~ 0.7 mg/dL    - Proteinuria: Normal (<0.2 grams)   - Date DSA Last Checked: 2017      Latest DSA: No   - BK Viremia: No   - Kidney Tx Biopsy: No    # Pancreas Tx (SPK):    - Pancreatic Exocrine Drainage: Enteric drained     - Blood glucose: Near euglycemia    But occasionally elevated fasting sugar.   On insulin: No   - HbA1c: Stable      Latest HbA1c: 6 %   - Pancreatic enzymes: Stable   - Date DSA Last Checked: 2017  Latest DSA: No   - Pancreas Tx Biopsy: No    # Immunosuppression: Tacrolimus immediate release (goal 5-8) and Azathioprine (dose 150 mg daily)   - Changes: No    # Infection Prophylaxis:   - PJP: Sulfa/TMP (Bactrim)    # Hypertension: Controlled;  Goal BP: < 130/80 weight has been in the range  284 - 300 lbs. Now it is 311 lbs   - Changes: No    # Prediabetes : Glucose generally running ~ low  135 - 139  . A1C 6 %    - Management as per primary team.    # DVT recurrent with history of PE: on Rivaroxaban, due to recurrent DVTs and PE, it seems reasonable to remain on anticoagulation. Has  IVC filter (permanent)    # Depression and anxiety: follow with her PCP, no SI     # SOB: still having dyspnea ,since the PE and does not follow specific pattern.     # Post menopausal  Bleed :  Just had one episode in July 2022  . Following  With primary care physician  ( PCP Dr Alban Clark -  [facility] physician : Telephone : [phone])     # Skin Cancer Risk:    - Discussed sun protection and recommend regular follow up with Dermatology.    # Medical Compliance: Yes     # Skin Cancer Risk:                -  Counseled patient about increased risk of skin cancers while on immunosuppressants. Discussed sun protection and recommend regular follow up with Dermatology    # COVID-19 Virus Review: Discussed COVID-19 virus and the potential medical risks.  Reviewed preventative health recommendations, which includes washing hands for 20 seconds, avoid touching your face, and social distancing.  Asked patient to inform the transplant center if they are exposed or diagnosed with this virus.    # COVID19 PCR : took booster on 12/3/2021 . Next booster due anytime after 3 months , which is now .    Health maintenance.  # Colonoscopy 2014 . Due now   #  Mammogram  : due now   # PAP smear : No records available .   # Flu vaccine : due now   #  Pneumococcal vaccine - due now     # Transplant History:  Etiology of Kidney Failure: Diabetes mellitus type 1  Tx: DDKT (SPK)  Significant changes in immunosuppression: on Aza/Tac   Significant transplant-related complications: None    Transplant Office Phone Number: [phone]    Assessment and plan was discussed with the patient and he voiced his understanding and agreement.    Return Visit: 6 months     Patient seen and staffed with Dr Zak Redmond MD, FACP, [facility]  Transplant Nephrology Fellow   [facility]   Pager [phone]  .    History of Present Illness   SPK and IS management   SOB - breathless with showers .   No CP   On and of cough   No fever  No ABD PAIN/N/V/D/Constipation  No focal weakness or altered sensation, headache     Home BP: not checked recently     Review of Systems   A comprehensive review of systems was obtained and negative, except as noted in the HPI or PMH.    I have reviewed and updated the patient's Past Medical History, Social History, and Medication List.    Active Medical Problems  Patient Active Problem List   Diagnosis     Hernia of abdominal cavity, with obstruction     Hernia of abdominal wall     Knee pain,  bilateral     Hip pain     HTN (hypertension)     Allergies  Wellbutrin [bupropion]    Medications  Current Outpatient Medications   Medication Sig     atorvastatin (LIPITOR) 10 MG tablet Take 1 tablet (10 mg) by mouth daily     azaTHIOprine (IMURAN) 50 MG tablet Take 3 tablets (150 mg) by mouth daily     calcium carbonate (OS-RENATO 500 MG [place]) 500 MG tablet Take 500 mg by mouth 2 times daily     Cetirizine HCl (ZYRTEC ALLERGY PO) Take 5 mg by mouth 2 times daily     cholecalciferol (VITAMIN D3) 1000 units (25 mcg) capsule Take 1 capsule by mouth daily.     order for DME AIRSENSE 10  10-15CM/H20  PILLOWS CHRISTIANSON FX FOR HER     PROGRAF (BRAND) 1 MG capsule Take 2 capsules (2 mg) by mouth 2 times daily     rivaroxaban ANTICOAGULANT (XARELTO ANTICOAGULANT) 20 MG TABS tablet Take 1 tablet (20 mg) by mouth daily (with dinner)     sodium bicarbonate 650 MG tablet Take 2 tablets (1,300 mg) by mouth 2 times daily     sulfamethoxazole-trimethoprim (BACTRIM/SEPTRA) 400-80 MG per tablet Take 1 tablet by mouth Every Mon, Wed, Fri Morning     venlafaxine (EFFEXOR) 75 MG tablet Take 150 mg by mouth every morning      No current facility-administered medications for this visit.     Patient was seen and evaluated by me, Will Crespo MD. I have reviewed the note and agree with the the plan of care as documented by the fellow.

## 2022-03-03 NOTE — PATIENT INSTRUCTIONS
Take your covid19 booster shot now   Talk to your PCP to update your age appropriate health screening   - colonoscopy  -mammogram  -pap smear    Please see a dermatologist for skin check

## 2022-03-03 NOTE — NURSING NOTE
Chief Complaint   Patient presents with     RECHECK       BP (!) 158/92   Pulse 91   Temp 98.4  F (36.9  C) (Oral)   Wt 141.1 kg (311 lb)   LMP 11/06/2015   SpO2 97%   BMI 46.60 kg/m      Jung Dailey MA on 3/3/2022 at 3:36 PM

## 2022-03-03 NOTE — LETTER
3/3/2022     RE: Delilah Fountain  [address]  [facility] [address]     Dear Colleague,    Thank you for referring your patient, Delilah Fountain, to the [facility] [place] [facility]. Please see a copy of my visit note below.      Recommendations  1. Comprehensive weight management  2. Take your covid19 booster shot now  . 2 weeks after that . Check COVID19 spike protein Ab to decide wether she will be a candidate for Evusheld  3.Talk to your PCP to update your age appropriate health screening   - colonoscopy  -mammogram  -pap smear  4.Please see a dermatologist for skin check       Assessment & Plan   # DDKT (SPK): Stable   - Baseline Cr ~ 0.7 mg/dL    - Proteinuria: Normal (<0.2 grams)   - Date DSA Last Checked: 2017      Latest DSA: No   - BK Viremia: No   - Kidney Tx Biopsy: No    # Pancreas Tx (SPK):    - Pancreatic Exocrine Drainage: Enteric drained     - Blood glucose: Near euglycemia    But occasionally elevated fasting sugar.   On insulin: No   - HbA1c: Stable      Latest HbA1c: 6 %   - Pancreatic enzymes: Stable   - Date DSA Last Checked: 2017  Latest DSA: No   - Pancreas Tx Biopsy: No    # Immunosuppression: Tacrolimus immediate release (goal 5-8) and Azathioprine (dose 150 mg daily)   - Changes: No    # Infection Prophylaxis:   - PJP: Sulfa/TMP (Bactrim)    # Hypertension: Controlled;  Goal BP: < 130/80 weight has been in the range  284 - 300 lbs. Now it is 311 lbs   - Changes: No    # Prediabetes : Glucose generally running ~ low  135 - 139  . A1C 6 %    - Management as per primary team.    # DVT recurrent with history of PE: on Rivaroxaban, due to recurrent DVTs and PE, it seems reasonable to remain on anticoagulation. Has  IVC filter (permanent)    # Depression and anxiety: follow with her PCP, no SI     # SOB: still having dyspnea ,since the PE and does not follow specific pattern.     # Post  menopausal  Bleed :  Just had one episode in July 2022  . Following  With primary care physician  ( PCP Dr Alban Clark -  [facility] physician : Telephone : [phone])     # Skin Cancer Risk:    - Discussed sun protection and recommend regular follow up with Dermatology.    # Medical Compliance: Yes     # Skin Cancer Risk:               -  Counseled patient about increased risk of skin cancers while on immunosuppressants. Discussed sun protection and recommend regular follow up with Dermatology    # COVID-19 Virus Review: Discussed COVID-19 virus and the potential medical risks.  Reviewed preventative health recommendations, which includes washing hands for 20 seconds, avoid touching your face, and social distancing.  Asked patient to inform the transplant center if they are exposed or diagnosed with this virus.    # COVID19 PCR : took booster on 12/3/2021 . Next booster due anytime after 3 months , which is now .    Health maintenance.  # Colonoscopy 2014 . Due now   #  Mammogram  : due now   # PAP smear : No records available .   # Flu vaccine : due now   #  Pneumococcal vaccine - due now     # Transplant History:  Etiology of Kidney Failure: Diabetes mellitus type 1  Tx: DDKT (SPK)  Significant changes in immunosuppression: on Aza/Tac   Significant transplant-related complications: None    Transplant Office Phone Number: [phone]    Assessment and plan was discussed with the patient and he voiced his understanding and agreement.    Return Visit: 6 months     Patient seen and staffed with Dr Zak Redmond MD, FACP, [facility]  Transplant Nephrology Fellow   [facility]   Pager [phone]  .    History of Present Illness   SPK and IS management   SOB - breathless with showers .   No CP   On and of cough   No fever  No ABD PAIN/N/V/D/Constipation  No focal weakness or altered sensation, headache     Home BP: not checked recently     Review of Systems   A comprehensive review of  systems was obtained and negative, except as noted in the HPI or PMH.    I have reviewed and updated the patient's Past Medical History, Social History, and Medication List.    Active Medical Problems  Patient Active Problem List   Diagnosis     Hernia of abdominal cavity, with obstruction     Hernia of abdominal wall     Knee pain, bilateral     Hip pain     HTN (hypertension)     Allergies  Wellbutrin [bupropion]    Medications  Current Outpatient Medications   Medication Sig     atorvastatin (LIPITOR) 10 MG tablet Take 1 tablet (10 mg) by mouth daily     azaTHIOprine (IMURAN) 50 MG tablet Take 3 tablets (150 mg) by mouth daily     calcium carbonate (OS-RENATO 500 MG [place]. [place]) 500 MG tablet Take 500 mg by mouth 2 times daily     Cetirizine HCl (ZYRTEC ALLERGY PO) Take 5 mg by mouth 2 times daily     cholecalciferol (VITAMIN D3) 1000 units (25 mcg) capsule Take 1 capsule by mouth daily.     order for DME AIRSENSE 10  10-15CM/H20  PILLOWS CHRISTIANSON FX FOR HER     PROGRAF (BRAND) 1 MG capsule Take 2 capsules (2 mg) by mouth 2 times daily     rivaroxaban ANTICOAGULANT (XARELTO ANTICOAGULANT) 20 MG TABS tablet Take 1 tablet (20 mg) by mouth daily (with dinner)     sodium bicarbonate 650 MG tablet Take 2 tablets (1,300 mg) by mouth 2 times daily     sulfamethoxazole-trimethoprim (BACTRIM/SEPTRA) 400-80 MG per tablet Take 1 tablet by mouth Every Mon, Wed, Fri Morning     venlafaxine (EFFEXOR) 75 MG tablet Take 150 mg by mouth every morning      No current facility-administered medications for this visit.     Patient was seen and evaluated by me, Will Crespo MD. I have reviewed the note and agree with the the plan of care as documented by the fellow.    Again, thank you for allowing me to participate in the care of your patient.      Sincerely,    Will Crespo MD

## 2022-03-04 ENCOUNTER — TELEPHONE (OUTPATIENT)
Dept: TRANSPLANT | Facility: CLINIC | Age: 57
End: 2022-03-04
Payer: COMMERCIAL

## 2022-03-04 LAB
CHOLEST SERPL-MCNC: 184 MG/DL
FASTING STATUS PATIENT QL REPORTED: YES
HDLC SERPL-MCNC: 41 MG/DL
LDLC SERPL CALC-MCNC: 122 MG/DL
NONHDLC SERPL-MCNC: 143 MG/DL
TRIGL SERPL-MCNC: 103 MG/DL

## 2022-03-04 NOTE — TELEPHONE ENCOUNTER
BRIEF SOCIAL WORK NOTE     Left VM for Delilah to discuss high PHQ-9 score, discuss coping and provide resources. Please notify SW if in contact with Delilah- still waiting on a return call. SW will continue to follow for psychosocial support, resources and advocate on behalf of the patient.    [facility]  Outpatient Kidney/Pancreas/Auto Islet Transplant Program   [address] [place] [address] [place] [address]  [email or web address]  Office: [phone] I Fax: [phone]

## 2022-03-14 ENCOUNTER — MYC MEDICAL ADVICE (OUTPATIENT)
Dept: HEMATOLOGY | Facility: CLINIC | Age: 57
End: 2022-03-14
Payer: COMMERCIAL

## 2022-03-14 DIAGNOSIS — I26.99 OTHER ACUTE PULMONARY EMBOLISM WITHOUT ACUTE COR PULMONALE (H): Primary | ICD-10-CM

## 2022-03-18 DIAGNOSIS — Z94.83 PANCREAS REPLACED BY TRANSPLANT (H): ICD-10-CM

## 2022-03-18 DIAGNOSIS — Z94.0 KIDNEY TRANSPLANTED: Primary | ICD-10-CM

## 2022-03-18 RX ORDER — AZATHIOPRINE 50 MG/1
150 TABLET ORAL DAILY
Qty: 90 TABLET | Refills: 11 | Status: SHIPPED | OUTPATIENT
Start: 2022-03-18 | End: 2023-04-11

## 2022-03-28 ENCOUNTER — TELEPHONE (OUTPATIENT)
Dept: TRANSPLANT | Facility: CLINIC | Age: 57
End: 2022-03-28
Payer: COMMERCIAL

## 2022-03-28 NOTE — TELEPHONE ENCOUNTER
Transplant Social Work Services Phone Call      Data: SW received a return VM that Delilah is seeking assistance finding a psychiatrist and psychologist. She mentions that she has struggled with depression for many years.  Intervention: SW left return VM to provide resources   Assessment: Resources needed for psychosocial support   Education provided by GABRIEL:   Resources given include:   [facility] Adult Outpatient Mental Health Programs  [address] [place] [address]   [phone]    [facility]   CALL NOW ([phone])  Plan:  GABRIEL requested that Delilah call the following [facility] OP mental health line to request an appointment. The [facility] is on the [facility] (several options for MH are available). GABRIEL advised Delilah to call [facility] (usually faster to get an apt), if needed. [facility] has several facilities throughout the metro area. GABRIEL will continue to follow for psychosocial support, resources and advocate on behalf of the patient.    Ene Haynes   [facility]  Outpatient Kidney/Pancreas/Auto Islet Transplant Program   [address] [place] [address]  [place] [address]  [email or web address]  Office: [phone] I Fax: [phone]

## 2022-04-06 ENCOUNTER — TELEPHONE (OUTPATIENT)
Dept: TRANSPLANT | Facility: CLINIC | Age: 57
End: 2022-04-06
Payer: COMMERCIAL

## 2022-04-06 DIAGNOSIS — R45.89 DEPRESSED MOOD: Primary | ICD-10-CM

## 2022-04-06 DIAGNOSIS — Z48.298 AFTERCARE FOLLOWING ORGAN TRANSPLANT: ICD-10-CM

## 2022-04-06 NOTE — TELEPHONE ENCOUNTER
BRIEF SOCIAL WORK NOTE     Contacted coordinator to inform her of Delilah's need for mental health supports. Delilah is motivated, agreeable to outpatient therapy. Coordinator to be in touch with referral team to get Delilah on the schedule with the [facility] Behavioral team on [facility].       [facility]  [address]  [facility] [address]  [place] [address]  [phone]  [email or web address]    Ene Haynes   [facility]  Outpatient Kidney/Pancreas/Auto Islet Transplant Program   [address] [place] [address]  [email or web address]  Office: [phone] I Fax: [phone]

## 2022-04-06 NOTE — PROGRESS NOTES
RNCC was notified by transplant  of patient's need for outpatient mental health evaluation and treatment given depressed mood. Discussed with Dr. Keisha Schwartz to place referral for mental health; ordered in Epic and message sent to adult referral specialist intake pool to reach out to patient.    Alem Diaz RN, BSN  Solid Organ Transplant, Post Kidney and Pancreas  Transplant Care Coordinator  [phone]

## 2022-04-13 DIAGNOSIS — I26.99 OTHER ACUTE PULMONARY EMBOLISM WITHOUT ACUTE COR PULMONALE (H): ICD-10-CM

## 2022-04-13 NOTE — TELEPHONE ENCOUNTER
Scheduled 4/20/22. 1 refill given to get this patient to her appointment.     Callie Tinsley  MSN, RN, PHN  [facility]  Office: [phone]  Fax: [phone]

## 2022-04-15 NOTE — PROGRESS NOTES
RE: Adult Mental Health Outpatient Clinic-[facility]  Received: 2 days ago  Sarah Foster, Will Estrada MD; Alem Diaz, RN  Thanks you . I spoke with the pt. She's going to check about sooner availability through another clinic first, and will call us back if she wants to get scheduled through another clinic.     Sarah LARA.             Previous Messages       ----- Message -----   From: Will Crespo MD   Sent: 4/13/2022   9:00 AM CDT   To: Alem Diaz, PIYUSH, Sarah Foster   Subject: RE: Adult Mental Health Outpatient Clinic-We*     We can still proceed with scheduling even if delayed.   Alem, ok to share the [phone]..... in case Delilah wants to pursue sooner assessment   ----- Message -----   From: Sarah Foster   Sent: 4/13/2022   8:48 AM CDT   To: Will Crespo MD, *   Subject: FW: Adult Mental Health Outpatient Clinic-We*     Hello,     Please see information below regarding referral for pt.     Please let us know if you have any questions.     Regards,   Sarah LARA   Psychiatry Intake     ----- Message -----   From: Candi Rachel   Sent: 4/12/2022  10:15 AM CDT   To: Sarah Foster   Subject: RE: Adult Mental Health Outpatient Clinic-We*     Guanakito Smith!    I work in the referral department and distribute the referrals that come in to our area. You should contact Dr. Crespo to see how to proceed with scheduling.     Thanks!   Candi   ----- Message -----   From: Sarah Foster   Sent: 4/12/2022  10:10 AM CDT   To: Candi Rachel   Subject: RE: Adult Mental Health Outpatient Clinic-We*     Felipe Christopher,     Thank you for sending over this referral. At this time, our next openings for psychiatry are at the end of June and early July. The referral stated the pt should be scheduled in 1-2 weeks, so I wanted to check whether our clinic would be appropriate at this time.     However, I see the pt's referral can be used with other [facility] clinics with  psychiatric med providers. If the pt is looking for a sooner appointment, I would recommend they call [phone].     Please let me know if you have further questions, or if you would like us to call the patient and initiate the process of being seen at our clinic.     Regards,   Sarah Foster Sr.   [facility]     ----- Message -----   From: Candi Rachel   Sent: 4/7/2022   7:02 AM CDT   To: Psychiatry Intake-[facility]   Subject: FW: Adult Mental Health Outpatient Clinic-*       ----- Message -----   From: Alem Diaz RN   Sent: 4/6/2022   3:44 PM CDT   To: Adult Referral Specialist Team   Subject: Adult Mental Health Outpatient Clinic-[facility],     Please call this patient to schedule.     Referral for (clinic): Outpatient Adult Mental Health Clinic     Referral for (provider):     Referring Provider: Dr. Will Crespo     Referring Clinic: [facility]     Phone: [phone]     Diagnosis: Depressed Mood     Type of appt: Evaluate and Treat     Urgency: Priority 1-2 weeks     Please reach out to patient.     Thank you!     Alem Diaz, RN, BSN   Solid Organ Transplant, Post Kidney and Pancreas   Transplant Care Coordinator   [phone]

## 2022-05-13 ENCOUNTER — APPOINTMENT (OUTPATIENT)
Dept: CT IMAGING | Facility: CLINIC | Age: 57
DRG: 336 | End: 2022-05-13
Attending: EMERGENCY MEDICINE
Payer: COMMERCIAL

## 2022-05-13 ENCOUNTER — HOSPITAL ENCOUNTER (INPATIENT)
Facility: CLINIC | Age: 57
LOS: 3 days | Discharge: HOME | DRG: 336 | End: 2022-05-16
Attending: EMERGENCY MEDICINE | Admitting: INTERNAL MEDICINE
Payer: COMMERCIAL

## 2022-05-13 DIAGNOSIS — Z86.718 PERSONAL HISTORY OF VENOUS THROMBOSIS AND EMBOLISM: Primary | ICD-10-CM

## 2022-05-13 DIAGNOSIS — K35.30 ACUTE APPENDICITIS WITH LOCALIZED PERITONITIS, UNSPECIFIED WHETHER ABSCESS PRESENT, UNSPECIFIED WHETHER GANGRENE PRESENT, UNSPECIFIED WHETHER PERFORATION PRESENT: ICD-10-CM

## 2022-05-13 DIAGNOSIS — E87.6 HYPOKALEMIA: ICD-10-CM

## 2022-05-13 DIAGNOSIS — E87.0 HYPERNATREMIA: ICD-10-CM

## 2022-05-13 DIAGNOSIS — Z20.822 CONTACT WITH AND (SUSPECTED) EXPOSURE TO COVID-19: ICD-10-CM

## 2022-05-13 DIAGNOSIS — Z94.0 KIDNEY REPLACED BY TRANSPLANT: ICD-10-CM

## 2022-05-13 LAB
ALBUMIN SERPL-MCNC: 3.1 G/DL (ref 3.4–5)
ALBUMIN UR-MCNC: 200 MG/DL
ALP SERPL-CCNC: 77 U/L (ref 40–150)
ALT SERPL W P-5'-P-CCNC: 16 U/L (ref 0–50)
ANION GAP SERPL CALCULATED.3IONS-SCNC: 10 MMOL/L (ref 3–14)
APPEARANCE UR: CLEAR
AST SERPL W P-5'-P-CCNC: 12 U/L (ref 0–45)
BASOPHILS # BLD AUTO: 0 10E3/UL (ref 0–0.2)
BASOPHILS NFR BLD AUTO: 0 %
BILIRUB SERPL-MCNC: 0.4 MG/DL (ref 0.2–1.3)
BILIRUB UR QL STRIP: NEGATIVE
BUN SERPL-MCNC: 8 MG/DL (ref 7–30)
CALCIUM SERPL-MCNC: 9.1 MG/DL (ref 8.5–10.1)
CHLORIDE BLD-SCNC: 110 MMOL/L (ref 94–109)
CO2 SERPL-SCNC: 22 MMOL/L (ref 20–32)
COLOR UR AUTO: YELLOW
CREAT SERPL-MCNC: 0.65 MG/DL (ref 0.52–1.04)
CRP SERPL-MCNC: 28 MG/L (ref 0–8)
EOSINOPHIL # BLD AUTO: 0.1 10E3/UL (ref 0–0.7)
EOSINOPHIL NFR BLD AUTO: 1 %
ERYTHROCYTE [DISTWIDTH] IN BLOOD BY AUTOMATED COUNT: 13.7 % (ref 10–15)
GFR SERPL CREATININE-BSD FRML MDRD: >90 ML/MIN/1.73M2
GLUCOSE BLD-MCNC: 160 MG/DL (ref 70–99)
GLUCOSE UR STRIP-MCNC: NEGATIVE MG/DL
HCT VFR BLD AUTO: 44.8 % (ref 35–47)
HGB BLD-MCNC: 14.6 G/DL (ref 11.7–15.7)
HGB UR QL STRIP: ABNORMAL
HOLD SPECIMEN: NORMAL
HOLD SPECIMEN: NORMAL
HYALINE CASTS: 1 /LPF
IMM GRANULOCYTES # BLD: 0.1 10E3/UL
IMM GRANULOCYTES NFR BLD: 1 %
KETONES UR STRIP-MCNC: NEGATIVE MG/DL
LACTATE SERPL-SCNC: 0.7 MMOL/L (ref 0.7–2)
LEUKOCYTE ESTERASE UR QL STRIP: NEGATIVE
LIPASE SERPL-CCNC: 62 U/L (ref 73–393)
LYMPHOCYTES # BLD AUTO: 0.7 10E3/UL (ref 0.8–5.3)
LYMPHOCYTES NFR BLD AUTO: 5 %
MCH RBC QN AUTO: 31.1 PG (ref 26.5–33)
MCHC RBC AUTO-ENTMCNC: 32.6 G/DL (ref 31.5–36.5)
MCV RBC AUTO: 95 FL (ref 78–100)
MONOCYTES # BLD AUTO: 0.9 10E3/UL (ref 0–1.3)
MONOCYTES NFR BLD AUTO: 7 %
MUCOUS THREADS #/AREA URNS LPF: PRESENT /LPF
NEUTROPHILS # BLD AUTO: 11.9 10E3/UL (ref 1.6–8.3)
NEUTROPHILS NFR BLD AUTO: 86 %
NITRATE UR QL: NEGATIVE
NRBC # BLD AUTO: 0 10E3/UL
NRBC BLD AUTO-RTO: 0 /100
PH UR STRIP: 7 [PH] (ref 5–7)
PLATELET # BLD AUTO: 288 10E3/UL (ref 150–450)
POTASSIUM BLD-SCNC: 3.7 MMOL/L (ref 3.4–5.3)
PROT SERPL-MCNC: 7.9 G/DL (ref 6.8–8.8)
RBC # BLD AUTO: 4.7 10E6/UL (ref 3.8–5.2)
RBC URINE: 7 /HPF
SODIUM SERPL-SCNC: 142 MMOL/L (ref 133–144)
SP GR UR STRIP: 1.01 (ref 1–1.03)
SQUAMOUS EPITHELIAL: 2 /HPF
UROBILINOGEN UR STRIP-MCNC: NORMAL MG/DL
WBC # BLD AUTO: 13.7 10E3/UL (ref 4–11)
WBC URINE: 3 /HPF

## 2022-05-13 PROCEDURE — 36415 COLL VENOUS BLD VENIPUNCTURE: CPT | Performed by: EMERGENCY MEDICINE

## 2022-05-13 PROCEDURE — 80053 COMPREHEN METABOLIC PANEL: CPT | Performed by: EMERGENCY MEDICINE

## 2022-05-13 PROCEDURE — 81001 URINALYSIS AUTO W/SCOPE: CPT | Performed by: EMERGENCY MEDICINE

## 2022-05-13 PROCEDURE — 96361 HYDRATE IV INFUSION ADD-ON: CPT | Performed by: EMERGENCY MEDICINE

## 2022-05-13 PROCEDURE — 87040 BLOOD CULTURE FOR BACTERIA: CPT | Performed by: INTERNAL MEDICINE

## 2022-05-13 PROCEDURE — 74177 CT ABD & PELVIS W/CONTRAST: CPT

## 2022-05-13 PROCEDURE — 83690 ASSAY OF LIPASE: CPT | Performed by: EMERGENCY MEDICINE

## 2022-05-13 PROCEDURE — C9803 HOPD COVID-19 SPEC COLLECT: HCPCS | Performed by: EMERGENCY MEDICINE

## 2022-05-13 PROCEDURE — 36415 COLL VENOUS BLD VENIPUNCTURE: CPT | Performed by: INTERNAL MEDICINE

## 2022-05-13 PROCEDURE — 74177 CT ABD & PELVIS W/CONTRAST: CPT | Mod: 26 | Performed by: RADIOLOGY

## 2022-05-13 PROCEDURE — 99285 EMERGENCY DEPT VISIT HI MDM: CPT | Performed by: EMERGENCY MEDICINE

## 2022-05-13 PROCEDURE — 120N000002 HC R&B MED SURG/OB UMMC

## 2022-05-13 PROCEDURE — 250N000012 HC RX MED GY IP 250 OP 636 PS 637

## 2022-05-13 PROCEDURE — 85025 COMPLETE CBC W/AUTO DIFF WBC: CPT | Performed by: EMERGENCY MEDICINE

## 2022-05-13 PROCEDURE — 86140 C-REACTIVE PROTEIN: CPT | Performed by: INTERNAL MEDICINE

## 2022-05-13 PROCEDURE — 83605 ASSAY OF LACTIC ACID: CPT | Performed by: INTERNAL MEDICINE

## 2022-05-13 PROCEDURE — 250N000011 HC RX IP 250 OP 636: Performed by: EMERGENCY MEDICINE

## 2022-05-13 PROCEDURE — 96375 TX/PRO/DX INJ NEW DRUG ADDON: CPT | Performed by: EMERGENCY MEDICINE

## 2022-05-13 PROCEDURE — 258N000003 HC RX IP 258 OP 636: Performed by: EMERGENCY MEDICINE

## 2022-05-13 PROCEDURE — 250N000011 HC RX IP 250 OP 636

## 2022-05-13 PROCEDURE — 96365 THER/PROPH/DIAG IV INF INIT: CPT | Mod: 59 | Performed by: EMERGENCY MEDICINE

## 2022-05-13 PROCEDURE — 87086 URINE CULTURE/COLONY COUNT: CPT | Performed by: EMERGENCY MEDICINE

## 2022-05-13 PROCEDURE — 99285 EMERGENCY DEPT VISIT HI MDM: CPT | Mod: 25 | Performed by: EMERGENCY MEDICINE

## 2022-05-13 PROCEDURE — 99223 1ST HOSP IP/OBS HIGH 75: CPT | Mod: AI | Performed by: INTERNAL MEDICINE

## 2022-05-13 RX ORDER — AMOXICILLIN 250 MG
1 CAPSULE ORAL 2 TIMES DAILY PRN
Status: DISCONTINUED | OUTPATIENT
Start: 2022-05-13 | End: 2022-05-16 | Stop reason: HOSPADM

## 2022-05-13 RX ORDER — TACROLIMUS 1 MG/1
2 CAPSULE, GELATIN COATED ORAL 2 TIMES DAILY
Status: DISCONTINUED | OUTPATIENT
Start: 2022-05-13 | End: 2022-05-16 | Stop reason: HOSPADM

## 2022-05-13 RX ORDER — LIDOCAINE 40 MG/G
CREAM TOPICAL
Status: DISCONTINUED | OUTPATIENT
Start: 2022-05-13 | End: 2022-05-16 | Stop reason: HOSPADM

## 2022-05-13 RX ORDER — AMOXICILLIN 250 MG
2 CAPSULE ORAL 2 TIMES DAILY PRN
Status: DISCONTINUED | OUTPATIENT
Start: 2022-05-13 | End: 2022-05-16 | Stop reason: HOSPADM

## 2022-05-13 RX ORDER — IOPAMIDOL 755 MG/ML
135 INJECTION, SOLUTION INTRAVASCULAR ONCE
Status: COMPLETED | OUTPATIENT
Start: 2022-05-13 | End: 2022-05-13

## 2022-05-13 RX ORDER — ONDANSETRON 2 MG/ML
INJECTION INTRAMUSCULAR; INTRAVENOUS
Status: COMPLETED
Start: 2022-05-13 | End: 2022-05-13

## 2022-05-13 RX ORDER — ATORVASTATIN CALCIUM 10 MG/1
10 TABLET, FILM COATED ORAL DAILY
Status: DISCONTINUED | OUTPATIENT
Start: 2022-05-14 | End: 2022-05-16 | Stop reason: HOSPADM

## 2022-05-13 RX ORDER — PIPERACILLIN SODIUM, TAZOBACTAM SODIUM 4; .5 G/20ML; G/20ML
4.5 INJECTION, POWDER, LYOPHILIZED, FOR SOLUTION INTRAVENOUS EVERY 6 HOURS
Status: DISCONTINUED | OUTPATIENT
Start: 2022-05-14 | End: 2022-05-16 | Stop reason: HOSPADM

## 2022-05-13 RX ORDER — SODIUM CHLORIDE 9 MG/ML
INJECTION, SOLUTION INTRAVENOUS CONTINUOUS
Status: DISCONTINUED | OUTPATIENT
Start: 2022-05-13 | End: 2022-05-16

## 2022-05-13 RX ORDER — AZATHIOPRINE 50 MG/1
150 TABLET ORAL DAILY
Status: DISCONTINUED | OUTPATIENT
Start: 2022-05-14 | End: 2022-05-16 | Stop reason: HOSPADM

## 2022-05-13 RX ORDER — ERTAPENEM 1 G/1
1 INJECTION, POWDER, LYOPHILIZED, FOR SOLUTION INTRAMUSCULAR; INTRAVENOUS ONCE
Status: COMPLETED | OUTPATIENT
Start: 2022-05-13 | End: 2022-05-13

## 2022-05-13 RX ORDER — ONDANSETRON 2 MG/ML
4 INJECTION INTRAMUSCULAR; INTRAVENOUS EVERY 6 HOURS PRN
Status: DISCONTINUED | OUTPATIENT
Start: 2022-05-13 | End: 2022-05-15

## 2022-05-13 RX ORDER — ONDANSETRON 4 MG/1
4 TABLET, ORALLY DISINTEGRATING ORAL EVERY 6 HOURS PRN
Status: DISCONTINUED | OUTPATIENT
Start: 2022-05-13 | End: 2022-05-15

## 2022-05-13 RX ORDER — SULFAMETHOXAZOLE AND TRIMETHOPRIM 400; 80 MG/1; MG/1
1 TABLET ORAL
Status: DISCONTINUED | OUTPATIENT
Start: 2022-05-16 | End: 2022-05-16 | Stop reason: HOSPADM

## 2022-05-13 RX ADMIN — ONDANSETRON 4 MG: 2 INJECTION INTRAMUSCULAR; INTRAVENOUS at 21:07

## 2022-05-13 RX ADMIN — HYDROMORPHONE HYDROCHLORIDE 1 MG: 1 INJECTION, SOLUTION INTRAMUSCULAR; INTRAVENOUS; SUBCUTANEOUS at 13:59

## 2022-05-13 RX ADMIN — ERTAPENEM SODIUM 1 G: 1 INJECTION, POWDER, LYOPHILIZED, FOR SOLUTION INTRAMUSCULAR; INTRAVENOUS at 18:39

## 2022-05-13 RX ADMIN — IOPAMIDOL 135 ML: 755 INJECTION, SOLUTION INTRAVENOUS at 14:58

## 2022-05-13 RX ADMIN — SODIUM CHLORIDE: 9 INJECTION, SOLUTION INTRAVENOUS at 18:39

## 2022-05-13 RX ADMIN — TACROLIMUS 2 MG: 1 CAPSULE, GELATIN COATED ORAL at 23:17

## 2022-05-13 ASSESSMENT — ACTIVITIES OF DAILY LIVING (ADL)
ADLS_ACUITY_SCORE: 37

## 2022-05-13 ASSESSMENT — ENCOUNTER SYMPTOMS
VOMITING: 0
NAUSEA: 1
ABDOMINAL PAIN: 1

## 2022-05-13 NOTE — ED PROVIDER NOTES
[place] EMERGENCY DEPARTMENT ([facility])  5/13/22 ED 26 1:02 PM   History     Chief Complaint   Patient presents with     Abdominal Pain     The history is provided by the patient and medical records.     Delilah Fountain is a 56 year old female with PMH notable for type I diabetes mellitus status post kidney/pancreas transplant in 2000, ventral hernia status post repair, GERD, closed loop obstruction who presents to the ED with right lower quadrant abdominal pain that started around 10PM. Initially the pain was subtle but worsened with any movement. Pain is reminiscent of when she had a prior closed-loop obstruction in 2/15/2013. She has some nausea and dry heaves.  She had a friend come over and try to massage her abdomen as she was instructed in the past for her closed-loop obstruction, but symptoms did not improve with this.  She continues to have abdominal pain that worsens with any movement or position changes at this time.  She has no history of appendectomy and her friend was concerned that she may have appendicitis. She feels very hungry at this time.  She had a normal bowel movement today, denies any changes in stools, no black or bloody stools. No hematuria, dysuria, urgency, or frequency.  Patient states that she was at her usual state of health before developing this abdominal pain today. Patient states her kidney and pancreas graft is performing well at this time. No drug allergies.     Regarding patient's closed-loop obstruction in 2013 she was admitted for NG decompression and observation and discharged after 3 days.  No surgical interventions.    Past Medical History  Past Medical History:   Diagnosis Date     Allergic rhinitis      Anxiety      Gastro-oesophageal reflux disease      Sleep apnea     uses cpap machine     Past Surgical History:   Procedure Laterality Date     EYE SURGERY      left and right cataract surgeries     HERNIA REPAIR      ventral     HERNIORRHAPHY  INCISIONAL (LOCATION)  3/21/2013    Procedure: HERNIORRHAPHY INCISIONAL (LOCATION);  Open Incisional Hernia  Anesthesia General with block;  Surgeon: Rhonda Pandey MD;  Location: [facility] OR     TRANSPLANT  2000    kidney/pancreas     atorvastatin (LIPITOR) 10 MG tablet  azaTHIOprine (IMURAN) 50 MG tablet  calcium carbonate (OS-RENATO 500 MG [place]. [place]) 500 MG tablet  Cetirizine HCl (ZYRTEC ALLERGY PO)  cholecalciferol (VITAMIN D3) 1000 units (25 mcg) capsule  order for DME  PROGRAF (BRAND) 1 MG capsule  rivaroxaban ANTICOAGULANT (XARELTO ANTICOAGULANT) 20 MG TABS tablet  sodium bicarbonate 650 MG tablet  sulfamethoxazole-trimethoprim (BACTRIM/SEPTRA) 400-80 MG per tablet  venlafaxine (EFFEXOR) 75 MG tablet      Allergies   Allergen Reactions     Wellbutrin [Bupropion] Other (See Comments)     Pt. Reports that it made her more weird     Family History  No family history on file.  Social History   Social History     Tobacco Use     Smoking status: Never Smoker     Smokeless tobacco: Never Used   Substance Use Topics     Alcohol use: Yes     Comment: rarely     Drug use: No      Past medical history, past surgical history, medications, allergies, family history, and social history were reviewed with the patient. No additional pertinent items.      Review of Systems   Gastrointestinal: Positive for abdominal pain and nausea. Negative for vomiting.   Genitourinary: Negative.      A complete review of systems was performed with pertinent positives and negatives noted in the HPI, and all other systems negative.    Physical Exam   BP: (!) 140/79  Pulse: 111  Temp: 97.9  F (36.6  C)  Resp: 18  SpO2: 100 %    Physical Exam  General: no acute distress. Appears stated age.   HENT: MMM, no oropharyngeal lesions  Eyes: PERRL, normal sclerae  Cardio: regular rate. Regular rhythm. Extremities well perfused  Resp: Normal work of breathing, normal respiratory rate.  Chest/Back: no visual signs of trauma, no CVA tenderness  Abdomen: RLQ and  right mid-abdominal only tenderness, non-distended, no rebound, no guarding  Neuro: alert and fully oriented. CN II-XII grossly intact. Grossly normal strength and sensation in all extremities.   MSK: no deformities. Grossly normal ROM in extremities.   Integumentary/Skin: no rash visualized, normal color  Psych: normal affect, normal behavior    ED Course      Procedures            Labs Ordered and Resulted from Time of ED Arrival to Time of ED Departure   COMPREHENSIVE METABOLIC PANEL - Abnormal       Result Value    Sodium 142      Potassium 3.7      Chloride 110 (*)     Carbon Dioxide (CO2) 22      Anion Gap 10      Urea Nitrogen 8      Creatinine 0.65      Calcium 9.1      Glucose 160 (*)     Alkaline Phosphatase 77      AST 12      ALT 16      Protein Total 7.9      Albumin 3.1 (*)     Bilirubin Total 0.4      GFR Estimate >90     LIPASE - Abnormal    Lipase 62 (*)    ROUTINE UA WITH MICROSCOPIC - Abnormal    Color Urine Yellow      Appearance Urine Clear      Glucose Urine Negative      Bilirubin Urine Negative      Ketones Urine Negative      Specific Gravity Urine 1.015      Blood Urine Small (*)     pH Urine 7.0      Protein Albumin Urine 200  (*)     Urobilinogen Urine Normal      Nitrite Urine Negative      Leukocyte Esterase Urine Negative      Mucus Urine Present (*)     RBC Urine 7 (*)     WBC Urine 3      Squamous Epithelials Urine 2 (*)     Hyaline Casts Urine 1     CBC WITH PLATELETS AND DIFFERENTIAL - Abnormal    WBC Count 13.7 (*)     RBC Count 4.70      Hemoglobin 14.6      Hematocrit 44.8      MCV 95      MCH 31.1      MCHC 32.6      RDW 13.7      Platelet Count 288      % Neutrophils 86      % Lymphocytes 5      % Monocytes 7      % Eosinophils 1      % Basophils 0      % Immature Granulocytes 1      NRBCs per 100 WBC 0      Absolute Neutrophils 11.9 (*)     Absolute Lymphocytes 0.7 (*)     Absolute Monocytes 0.9      Absolute Eosinophils 0.1      Absolute Basophils 0.0      Absolute Immature  Granulocytes 0.1      Absolute NRBCs 0.0     URINE CULTURE     CT Abdomen Pelvis w Contrast    (Results Pending)          Assessments & Plan (with Medical Decision Making)   Patient presenting with RLQ abdominal pain. Vitals in the ED unremarkable. Nursing notes reviewed.     No RUQ pain nor significant LFT elevations to suggest hepatobiliary pathology. No lipase elevation to suggest pancreatitis. No peritoneal signs on exam to suggest peritonitis.  UA without evidence of UTI.  CT pending for evaluation of potential appendicitis.     In the ED, the patient's symptoms were managed with hydromorphone, with improvement in symptoms upon reassessment.     Patient signed out to oncoming provider with plan for follow-up of CT abdomen/pelvis, dispo pending with CT findings.    Final diagnoses:   Abdominal pain, right lower quadrant   Status post simultaneous kidney and pancreas transplant (H)     New Prescriptions    No medications on file     I, Nannette Mcnair, am serving as a trained medical scribe to document services personally performed by Augustin Lindsay MD based on the provider's statements to me on May 13, 2022.  This document has been checked and approved by the attending provider.    I, Augustin Lindsay MD, was physically present and have reviewed and verified the accuracy of this note documented by Nannette Mcnair, medical scribe.        --  Augustin Lindsay MD   Emergency Medicine   [facility] EMERGENCY DEPARTMENT  5/13/2022     Augustin Lindsay MD  05/13/22 7226

## 2022-05-13 NOTE — ED TRIAGE NOTES
"Pt ambulatory to triage with c/o RLQ abdominal pain. HX kidney/panc transplant, multiple PEs on thinners, and LLQ \"intestional torsion\". Per pt started having RLQ abdominal pain last night, and this morning the pain increased & became nauseated. Pt A&Ox4, tachycardic otherwise all other VSS on RA, pain 2/10.      Triage Assessment     Row Name 05/13/22 1237       Triage Assessment (Adult)    Airway WDL WDL       Respiratory WDL    Respiratory WDL WDL       Cardiac WDL    Cardiac WDL X;rhythm    Cardiac Rhythm tachycardic       Cognitive/Neuro/Behavioral WDL    Cognitive/Neuro/Behavioral WDL WDL              "

## 2022-05-13 NOTE — H&P
[facility]    History and Physical - Medicine Service, [facility] TEAM        Date of Admission:  5/13/2022    Assessment & Plan      Delilah Fountain is a 56 year old female admitted on 5/13/2022. She has a history of DVT and PE (on rivaroxaban), HERLINDA on CPAP, T1DM, combined pancreas/kdiney transplant as a result of T1DM and is admitted for appendicitis    #appendicitis  #RLQ pain  #nausea  #leukocytosis  #tachypnea  #sepsis  Patient here with RLQ pain for 1 day, was initially hypertensive on arrival though this resolved and other vitals were unremarkable. Labs notable for leukocytosis with neutrophilic predominance. Otherwise lipase low, UA with no concern for infection, and electrolytes WNL. CT A/P notable for acute uncomplicated appendicitis. Surgery evaluated patient and will not pursue surgical intervention tonight with patient being fully anticoagulated. Patient received ertapenem in ED and will be covered for 24 hours though unclear why such broad abx used, would prefer other coverage. On exam RLQ tenderness appreciated but otherwise clinically doing well, no acute abdomen. Admit to medicine for management of comorbidities while surgery follows  - surgery consulted   - CLD until midnight, then NPO   - hold xarelto   - IV ertapenem  - will order for zosyn to continue starting tomorrow    #DDKT  #pancreas transplant  Patient with history of pancreas and kidney transplant secondary to T1DM. Doing well with no issues for quite some time. No laboratory evidence of any issues.   - tacrolimus  - azathioprine  - sulfa/TMP prophylaxis MWF  - consult pancreas/kidney transplant    #H/O of DVT with recurrent PE  - hold xarelto tonight per surgery   - permanent IVC filter in place          Diet: Clear Liquid Diet  NPO per Anesthesia Guidelines for Procedure/Surgery Except for: Meds  DVT Prophylaxis: Pneumatic Compression Devices  T[place]ano Catheter: Not present  Fluids: IVF    Central Lines: None  Cardiac Monitoring: None  Code Status: Full Code    Clinically Significant Risk Factors Present on Admission             # Hypoalbuminemia: Albumin = 3.1 g/dL (Ref range: 3.4 - 5.0 g/dL) on admission, will monitor as appropriate   # Coagulation Defect: home medication list includes an anticoagulant medication        Disposition Plan   Expected Discharge:  2-3 days   Anticipated discharge location:  Awaiting care coordination huddle  Delays:   pending surgery        The patient's care was discussed with the Attending Physician, Dr. Stacy.    Amor Bliss MD  Medicine Service, [facility]  Securely message with the Vocera Web Console (learn more here)  Text page via [facility] Paging/Directory   Please see signed in provider for up to date coverage information    ______________________________________________________________________    Chief Complaint   Abdominal pain    History is obtained from the patient    History of Present Illness   Delilah Fountain is a 56 year old female admitted on 5/13/2022. She has a history of DVT and PE (on rivaroxaban), HERLINDA on CPAP, T1DM, combined pancreas/kdiney transplant as a result of T1DM and is admitted for appendicitis    Patient was in her usual state of health until last nigth when she developed some abdominal pain in her RLQ. She notes it progressively got worse and worse and she developed some nausea. Has a history of hernias that resolve with massage and these were not working for her thus presented to ED. Deneis fevers/chills, SOB, chest pain, abd pain, numbness/weakness/tingling in extremities, vomiting or changes in bowels.     In ED labs were largley unremarkable and imaging concerning for appendicitis.     Review of Systems    The 10 point Review of Systems is negative other than noted in the HPI or here.     Past Medical History    I have reviewed this patient's medical history and  updated it with pertinent information if needed.   Past Medical History:   Diagnosis Date     Allergic rhinitis      Anxiety      Gastro-oesophageal reflux disease      Sleep apnea     uses cpap machine        Past Surgical History   I have reviewed this patient's surgical history and updated it with pertinent information if needed.  Past Surgical History:   Procedure Laterality Date     EYE SURGERY      left and right cataract surgeries     HERNIA REPAIR      ventral     HERNIORRHAPHY INCISIONAL (LOCATION)  3/21/2013    Procedure: HERNIORRHAPHY INCISIONAL (LOCATION);  Open Incisional Hernia  Anesthesia General with block;  Surgeon: Rhonda Pandey MD;  Location: [facility] OR     TRANSPLANT  2000    kidney/pancreas        Social History   I have reviewed this patient's social history and updated it with pertinent information if needed. Delilah Fountain  reports that she has never smoked. She has never used smokeless tobacco. She reports current alcohol use. She reports that she does not use drugs.    Family History   No significant family history, including no history of: cancer, heart disease    Prior to Admission Medications   Prior to Admission Medications   Prescriptions Last Dose Informant Patient Reported? Taking?   Cetirizine HCl (ZYRTEC ALLERGY PO)   Yes No   Sig: Take 5 mg by mouth 2 times daily   PROGRAF (BRAND) 1 MG capsule   No No   Sig: Take 2 capsules (2 mg) by mouth 2 times daily   atorvastatin (LIPITOR) 10 MG tablet   No No   Sig: Take 1 tablet (10 mg) by mouth daily   azaTHIOprine (IMURAN) 50 MG tablet   No No   Sig: Take 3 tablets (150 mg) by mouth daily   calcium carbonate (OS-RENATO 500 MG [place]. [place]) 500 MG tablet   Yes No   Sig: Take 500 mg by mouth 2 times daily   cholecalciferol (VITAMIN D3) 1000 units (25 mcg) capsule   No No   Sig: Take 1 capsule by mouth daily.   order for DME   Yes No   Sig: AIRSENSE 10  10-15CM/H20  PILLOWS CHRISTIANSON FX FOR HER   rivaroxaban ANTICOAGULANT (XARELTO ANTICOAGULANT)  20 MG TABS tablet   No No   Sig: Take 1 tablet (20 mg) by mouth daily (with dinner)   sodium bicarbonate 650 MG tablet   No No   Sig: Take 2 tablets (1,300 mg) by mouth 2 times daily   sulfamethoxazole-trimethoprim (BACTRIM/SEPTRA) 400-80 MG per tablet   No No   Sig: Take 1 tablet by mouth Every Mon, Wed, Fri Morning   venlafaxine (EFFEXOR) 75 MG tablet   Yes No   Sig: Take 150 mg by mouth every morning       Facility-Administered Medications: None     Allergies   Allergies   Allergen Reactions     Wellbutrin [Bupropion] Other (See Comments)     Pt. Reports that it made her more weird       Physical Exam   Vital Signs: Temp: 98.5  F (36.9  C) Temp src: Oral BP: 133/71 Pulse: 88   Resp: 18 SpO2: 95 % O2 Device: Nasal cannula Oxygen Delivery: 2 LPM  Weight: 0 lbs 0 oz    General: alert, pleasant, no acute distress sitting/laying on bed  HEENT: PERRLA, EOMI, no conjunctival injection or scleral icterus, MMM, neck supple with no lymphadenopathy, ROM intact  Cardiac: RRR, no clicks rubs or murmurs  Respiratory: good respiratory effort, lungs CTAB in all lung fields bilaterally, no wheezes/rhonchi/rales or crackles appreciated, air movement throughout  GI: tenderness to palpation in RLQ, otherwise no tenderness to palpation in other quadrants, BS normoactive, nondistended  Skin: no erythema, open skin wounds, or concerning lesions  Extremities: moving all extremities equally, no peripheral edema, brisk pulses, warm and well perfused  Neuro: alert and oriented X4, 5/5 strength in upper and lower extremities bilaterally, sensation intact    Data   Data reviewed today:     Recent Labs   Lab 05/13/22  1302   WBC 13.7*   HGB 14.6   MCV 95         POTASSIUM 3.7   CHLORIDE 110*   CO2 22   BUN 8   CR 0.65   ANIONGAP 10   RENATO 9.1   *   ALBUMIN 3.1*   PROTTOTAL 7.9   BILITOTAL 0.4   ALKPHOS 77   ALT 16   AST 12   LIPASE 62*     CT A/P  1. Acute uncomplicated appendicitis.  2. Unremarkable appearance of the left  lower quadrant transplant  kidney and atrophic appearance of the right lower quadrant transplant  pancreas.  3. Colon containing ventral hernia above the umbilicus without  evidence of dissection.  4. Large left spigelian hernia containing small and large bowel  without evidence of obstruction.  5. Hepatic steatosis.  6. IVC filter.

## 2022-05-13 NOTE — CONSULTS
"     Assessment and Plan:   Assessment:   Ms. Fountain is a 57 yo female h/o T1DM and ESRD s/p SPKT 2000, LLQ incisional hernia s/p repair 2013 with biologic mesh with Dr. Pandey of transplant surgery, GERD, HERLINDA on CPAP, bilateral PE despite IVC filter placement currently on Xarelto (took yesterday morning), HTN who presented to [facility] ED 5/13/22 with RLQ pain since last night associated with nausea associated with nausea and a mild leukocytosis. CT scan concerning for uncomplicated acute appendicitis and large left sided non-obstructed ventral hernia. Her appendix appears to be just under her fascia. She has minimal tenderness resting and mild palpation on exam. Her nausea has resolved. Since she recently took her Xarelto will have to hold off on surgical intervention for tonight.          Plan:   No surgical intervention tonight since patient fully anticoagulated with Xarelto.  IV ertapenem.   Ok for CLD. NPO at midnight.  Hold Xarelto.   Admit to medicine team given patient complexity.    Discussed with staff, Dr. Lea.    Jennifer Boyer  General Surgery Resident, PGY-2            Chief Complaint:   RLQ abdominal pain         History of Present Illness:   Ms. Fountain is a 57 yo female h/o T1DM and ESRD s/p SPKT 2000, LLQ incisional hernia s/p repair 2013 with biologic mesh with Dr. Pandey of transplant surgery, GERD, HERLINDA on CPAP, bilateral PE despite IVC filter placement currently on Xarelto (took this morning), HTN who presented to [facility] ED 5/13/22 with RLQ pain since last night around 10 pm associated with nausea but no emesis. The pain was worse with movement and with car bumps on the way to the ED. No fever or chills. No constipation or diarrhea. Last bowel movement last night. She has not eaten anything today and currently feels \"starving.\" Her last food intake was last night.     In the ED her pain is currently 0 out of 10. She can feel discomfort in the RLQ when she moves. Her nausea has resolved. " She has a leukocytosis to 13.7. She is afebrile and hemodynamically WNL.           Past Medical History:     Past Medical History:   Diagnosis Date     Allergic rhinitis      Anxiety      Gastro-oesophageal reflux disease      Sleep apnea     uses cpap machine             Past Surgical History:     Past Surgical History:   Procedure Laterality Date     EYE SURGERY      left and right cataract surgeries     HERNIA REPAIR      ventral     HERNIORRHAPHY INCISIONAL (LOCATION)  3/21/2013    Procedure: HERNIORRHAPHY INCISIONAL (LOCATION);  Open Incisional Hernia  Anesthesia General with block;  Surgeon: Rhonda Pandey MD;  Location: [facility] OR     TRANSPLANT  2000    kidney/pancreas             Social History:     Social History     Tobacco Use     Smoking status: Never Smoker     Smokeless tobacco: Never Used   Substance Use Topics     Alcohol use: Yes     Comment: rarely             Family History:   No family history on file.             Allergies:     Allergies   Allergen Reactions     Wellbutrin [Bupropion] Other (See Comments)     Pt. Reports that it made her more weird             Medications:     No current facility-administered medications for this encounter.     Current Outpatient Medications   Medication Sig     atorvastatin (LIPITOR) 10 MG tablet Take 1 tablet (10 mg) by mouth daily     azaTHIOprine (IMURAN) 50 MG tablet Take 3 tablets (150 mg) by mouth daily     calcium carbonate (OS-RENATO 500 MG [place]. [place]) 500 MG tablet Take 500 mg by mouth 2 times daily     Cetirizine HCl (ZYRTEC ALLERGY PO) Take 5 mg by mouth 2 times daily     cholecalciferol (VITAMIN D3) 1000 units (25 mcg) capsule Take 1 capsule by mouth daily.     order for DME AIRSENSE 10  10-15CM/H20  PILLOWS CHRISTIANSON FX FOR HER     PROGRAF (BRAND) 1 MG capsule Take 2 capsules (2 mg) by mouth 2 times daily     rivaroxaban ANTICOAGULANT (XARELTO ANTICOAGULANT) 20 MG TABS tablet Take 1 tablet (20 mg) by mouth daily (with dinner)     sodium bicarbonate 650 MG  tablet Take 2 tablets (1,300 mg) by mouth 2 times daily     sulfamethoxazole-trimethoprim (BACTRIM/SEPTRA) 400-80 MG per tablet Take 1 tablet by mouth Every Mon, Wed, Fri Morning     venlafaxine (EFFEXOR) 75 MG tablet Take 150 mg by mouth every morning                Review of Systems:   Complete review of systems negative except as documented in HPI          Physical Exam:   All vitals have been reviewed  Temp:  [97.9  F (36.6  C)-98.8  F (37.1  C)] 98.8  F (37.1  C)  Pulse:  [] 84  Resp:  [18] 18  BP: (119-161)/(62-79) 119/62  SpO2:  [96 %-100 %] 100 %  No intake or output data in the 24 hours ending 05/13/22 1610  Physical Exam:  General: NAD, resting in bed  HEENT: atraumatic, normocephalic  Neck: no JVD  Resp: no dyspnea on room air  Cardio: RRR  Abdomen: soft, large left sided ventral hernia that is soft but difficult to define due to body habitus, mild abdominal tenderness in the RLQ, no rebound tenderness or guarding, no peritoneal signs, midline surgical scar well healed  Extremities: warm well perfused  Psych: normal affect, alert and oriented  Neuro: CN 2-12 grossly intact  Skin: no rashes or open wounds          Data:   All laboratory data reviewed    Results:  BMP  Recent Labs   Lab 05/13/22  1302      POTASSIUM 3.7   CHLORIDE 110*   CO2 22   BUN 8   CR 0.65   *     CBC  Recent Labs   Lab 05/13/22  1302   WBC 13.7*   HGB 14.6        LFT  Recent Labs   Lab 05/13/22  1302   AST 12   ALT 16   ALKPHOS 77   BILITOTAL 0.4   ALBUMIN 3.1*     Recent Labs   Lab 05/13/22  1302   *       Imaging:  CT Abdomen Pelvis w Contrast    Result Date: 5/13/2022  EXAMINATION: CT ABDOMEN PELVIS W CONTRAST, 5/13/2022 3:21 PM TECHNIQUE:  Helical CT of the abdomen and pelvis with IV contrast. Coronal and sagittal reformatted images were created, archived and reviewed at the workstation for further assessment. COMPARISON: CT 11/20/2015 HISTORY: RLQ pain, concern for appendicitis; hx kidney/panc  transplant FINDINGS:  The appendix demonstrates increased enhancement, wall thickening, and periappendiceal stranding (series 4 images 41-53, series 6 images [id number]). Caliber approximately 9 mm. No evidence of perforation or abscess. Diffuse hepatic steatosis. Unremarkable gallbladder and biliary tree. Normal spleen. Mild fatty atrophy of the pancreas. No adrenal nodules. Atrophic native kidneys. Unremarkable appearance of the left lower quadrant transplant kidney. Atrophic appearance of the right lower quadrant adjacent transplant pancreas. Colon containing ventral hernia above the umbilicus without evidence of obstruction. Left spigelian hernia containing small and large bowel and omental fat without evidence of obstruction. No or intraperitoneal free air, ascites, or organized fluid collections. IVC filter in the infrarenal IVC. Unremarkable uterus and adnexa. Normal-appearing bladder. No acute or suspicious osseous abnormality. The lung bases are clear.     IMPRESSION:  1. Acute uncomplicated appendicitis. 2. Unremarkable appearance of the left lower quadrant transplant kidney and atrophic appearance of the right lower quadrant transplant pancreas. 3. Colon containing ventral hernia above the umbilicus without evidence of dissection. 4. Large left spigelian hernia containing small and large bowel without evidence of obstruction. 5. Hepatic steatosis. 6. IVC filter. I have personally reviewed the examination and initial interpretation and I agree with the findings. CONNIE JIMENEZ MD   SYSTEM ID:  [id number]         Jennifer Boyer MD

## 2022-05-13 NOTE — ED NOTES
I took signout on the patient from Dr. Lindsay.  This is a 86-year-old female with right lower quadrant abdominal pain.  Lab work shows WBC count of 13.7.  CT abdomen pelvis shows uncomplicated appendicitis.  I discussed the case with General Surgery saw the patient.  They note that they want to try to avoid surgery as this would be high risk with the patient.  They recommend giving patient ertapenem, starting on clear liquid diet and giving IV fluids and monitoring.  We will admit to the Internal Medicine service to monitor with antibiotic treatment.     Willis Portillo,   05/13/22 [id number]

## 2022-05-14 ENCOUNTER — ANESTHESIA EVENT (OUTPATIENT)
Dept: SURGERY | Facility: CLINIC | Age: 57
DRG: 336 | End: 2022-05-14
Payer: COMMERCIAL

## 2022-05-14 LAB
ANION GAP SERPL CALCULATED.3IONS-SCNC: 7 MMOL/L (ref 3–14)
APTT PPP: 42 SECONDS (ref 22–38)
BUN SERPL-MCNC: 10 MG/DL (ref 7–30)
CALCIUM SERPL-MCNC: 9 MG/DL (ref 8.5–10.1)
CHLORIDE BLD-SCNC: 112 MMOL/L (ref 94–109)
CO2 SERPL-SCNC: 24 MMOL/L (ref 20–32)
CREAT SERPL-MCNC: 0.62 MG/DL (ref 0.52–1.04)
ERYTHROCYTE [DISTWIDTH] IN BLOOD BY AUTOMATED COUNT: 14 % (ref 10–15)
ERYTHROCYTE [DISTWIDTH] IN BLOOD BY AUTOMATED COUNT: 14.1 % (ref 10–15)
GFR SERPL CREATININE-BSD FRML MDRD: >90 ML/MIN/1.73M2
GLUCOSE BLD-MCNC: 126 MG/DL (ref 70–99)
HCT VFR BLD AUTO: 43 % (ref 35–47)
HCT VFR BLD AUTO: 44.5 % (ref 35–47)
HGB BLD-MCNC: 13.9 G/DL (ref 11.7–15.7)
HGB BLD-MCNC: 14.3 G/DL (ref 11.7–15.7)
INR PPP: 1.22 (ref 0.85–1.15)
MCH RBC QN AUTO: 31.4 PG (ref 26.5–33)
MCH RBC QN AUTO: 31.7 PG (ref 26.5–33)
MCHC RBC AUTO-ENTMCNC: 32.1 G/DL (ref 31.5–36.5)
MCHC RBC AUTO-ENTMCNC: 32.3 G/DL (ref 31.5–36.5)
MCV RBC AUTO: 98 FL (ref 78–100)
MCV RBC AUTO: 98 FL (ref 78–100)
PLATELET # BLD AUTO: 257 10E3/UL (ref 150–450)
PLATELET # BLD AUTO: 266 10E3/UL (ref 150–450)
POTASSIUM BLD-SCNC: 3.6 MMOL/L (ref 3.4–5.3)
RBC # BLD AUTO: 4.39 10E6/UL (ref 3.8–5.2)
RBC # BLD AUTO: 4.55 10E6/UL (ref 3.8–5.2)
SARS-COV-2 RNA RESP QL NAA+PROBE: NEGATIVE
SODIUM SERPL-SCNC: 143 MMOL/L (ref 133–144)
WBC # BLD AUTO: 10.1 10E3/UL (ref 4–11)
WBC # BLD AUTO: 11 10E3/UL (ref 4–11)

## 2022-05-14 PROCEDURE — 250N000013 HC RX MED GY IP 250 OP 250 PS 637

## 2022-05-14 PROCEDURE — 99233 SBSQ HOSP IP/OBS HIGH 50: CPT | Performed by: STUDENT IN AN ORGANIZED HEALTH CARE EDUCATION/TRAINING PROGRAM

## 2022-05-14 PROCEDURE — 250N000012 HC RX MED GY IP 250 OP 636 PS 637

## 2022-05-14 PROCEDURE — 96361 HYDRATE IV INFUSION ADD-ON: CPT | Performed by: EMERGENCY MEDICINE

## 2022-05-14 PROCEDURE — 250N000013 HC RX MED GY IP 250 OP 250 PS 637: Performed by: STUDENT IN AN ORGANIZED HEALTH CARE EDUCATION/TRAINING PROGRAM

## 2022-05-14 PROCEDURE — 250N000011 HC RX IP 250 OP 636: Performed by: INTERNAL MEDICINE

## 2022-05-14 PROCEDURE — 80048 BASIC METABOLIC PNL TOTAL CA: CPT

## 2022-05-14 PROCEDURE — 120N000002 HC R&B MED SURG/OB UMMC

## 2022-05-14 PROCEDURE — 999N000248 HC STATISTIC IV INSERT WITH US BY RN

## 2022-05-14 PROCEDURE — 85610 PROTHROMBIN TIME: CPT

## 2022-05-14 PROCEDURE — U0005 INFEC AGEN DETEC AMPLI PROBE: HCPCS | Performed by: STUDENT IN AN ORGANIZED HEALTH CARE EDUCATION/TRAINING PROGRAM

## 2022-05-14 PROCEDURE — 99223 1ST HOSP IP/OBS HIGH 75: CPT | Mod: 24 | Performed by: INTERNAL MEDICINE

## 2022-05-14 PROCEDURE — 36415 COLL VENOUS BLD VENIPUNCTURE: CPT | Performed by: STUDENT IN AN ORGANIZED HEALTH CARE EDUCATION/TRAINING PROGRAM

## 2022-05-14 PROCEDURE — 250N000011 HC RX IP 250 OP 636: Performed by: STUDENT IN AN ORGANIZED HEALTH CARE EDUCATION/TRAINING PROGRAM

## 2022-05-14 PROCEDURE — 85027 COMPLETE CBC AUTOMATED: CPT | Performed by: STUDENT IN AN ORGANIZED HEALTH CARE EDUCATION/TRAINING PROGRAM

## 2022-05-14 PROCEDURE — 999N000157 HC STATISTIC RCP TIME EA 10 MIN

## 2022-05-14 PROCEDURE — 85730 THROMBOPLASTIN TIME PARTIAL: CPT | Performed by: STUDENT IN AN ORGANIZED HEALTH CARE EDUCATION/TRAINING PROGRAM

## 2022-05-14 PROCEDURE — 999N000128 HC STATISTIC PERIPHERAL IV START W/O US GUIDANCE

## 2022-05-14 PROCEDURE — 85027 COMPLETE CBC AUTOMATED: CPT

## 2022-05-14 PROCEDURE — 36415 COLL VENOUS BLD VENIPUNCTURE: CPT

## 2022-05-14 RX ORDER — VENLAFAXINE HYDROCHLORIDE 75 MG/1
150 CAPSULE, EXTENDED RELEASE ORAL 2 TIMES DAILY
COMMUNITY
End: 2023-10-20

## 2022-05-14 RX ORDER — HEPARIN SODIUM 10000 [USP'U]/100ML
0-5000 INJECTION, SOLUTION INTRAVENOUS CONTINUOUS
Status: DISCONTINUED | OUTPATIENT
Start: 2022-05-14 | End: 2022-05-15

## 2022-05-14 RX ORDER — HEPARIN SODIUM 10000 [USP'U]/100ML
0-5000 INJECTION, SOLUTION INTRAVENOUS CONTINUOUS
Status: DISCONTINUED | OUTPATIENT
Start: 2022-05-14 | End: 2022-05-14

## 2022-05-14 RX ORDER — VENLAFAXINE HYDROCHLORIDE 150 MG/1
150 CAPSULE, EXTENDED RELEASE ORAL EVERY MORNING
Status: DISCONTINUED | OUTPATIENT
Start: 2022-05-14 | End: 2022-05-14

## 2022-05-14 RX ORDER — VENLAFAXINE HYDROCHLORIDE 150 MG/1
150 CAPSULE, EXTENDED RELEASE ORAL 2 TIMES DAILY
Status: DISCONTINUED | OUTPATIENT
Start: 2022-05-14 | End: 2022-05-16 | Stop reason: HOSPADM

## 2022-05-14 RX ADMIN — AZATHIOPRINE 150 MG: 50 TABLET ORAL at 07:36

## 2022-05-14 RX ADMIN — VENLAFAXINE HYDROCHLORIDE 150 MG: 150 CAPSULE, EXTENDED RELEASE ORAL at 12:40

## 2022-05-14 RX ADMIN — TACROLIMUS 2 MG: 1 CAPSULE, GELATIN COATED ORAL at 07:36

## 2022-05-14 RX ADMIN — TACROLIMUS 2 MG: 1 CAPSULE, GELATIN COATED ORAL at 20:38

## 2022-05-14 RX ADMIN — PIPERACILLIN AND TAZOBACTAM 4.5 G: 4; .5 INJECTION, POWDER, FOR SOLUTION INTRAVENOUS at 13:42

## 2022-05-14 RX ADMIN — ATORVASTATIN CALCIUM 10 MG: 10 TABLET, FILM COATED ORAL at 07:36

## 2022-05-14 RX ADMIN — HEPARIN SODIUM AND DEXTROSE 1350 UNITS/HR: 10000; 5 INJECTION INTRAVENOUS at 21:59

## 2022-05-14 RX ADMIN — PIPERACILLIN AND TAZOBACTAM 4.5 G: 4; .5 INJECTION, POWDER, FOR SOLUTION INTRAVENOUS at 20:38

## 2022-05-14 RX ADMIN — VENLAFAXINE HYDROCHLORIDE 150 MG: 150 CAPSULE, EXTENDED RELEASE ORAL at 20:38

## 2022-05-14 RX ADMIN — PIPERACILLIN AND TAZOBACTAM 4.5 G: 4; .5 INJECTION, POWDER, FOR SOLUTION INTRAVENOUS at 07:36

## 2022-05-14 RX ADMIN — HEPARIN SODIUM AND DEXTROSE 1200 UNITS/HR: 10000; 5 INJECTION INTRAVENOUS at 14:38

## 2022-05-14 RX ADMIN — HEPARIN SODIUM AND DEXTROSE 1800 UNITS/HR: 10000; 5 INJECTION INTRAVENOUS at 14:26

## 2022-05-14 ASSESSMENT — ACTIVITIES OF DAILY LIVING (ADL)
ADLS_ACUITY_SCORE: 37
ADLS_ACUITY_SCORE: 22
ADLS_ACUITY_SCORE: 37
ADLS_ACUITY_SCORE: 37
ADLS_ACUITY_SCORE: 22

## 2022-05-14 NOTE — PROGRESS NOTES
"General Surgery Progress Note    Subjective: Had some nausea with movement due to pain and emesis x 2 overnight. Pain well controlled on current pain regimen but still has some tenderness in the RLQ. Nausea has improved.     Objective:   BP (!) 147/80 (BP Location: Left arm)   Pulse 76   Temp (!) 96.1  F (35.6  C) (Oral)   Resp 16   Ht 1.727 m (5' 8\")   LMP 11/06/2015   SpO2 95%   BMI 47.29 kg/m      PE:  Gen: NAD, comfortable in bed  CV: RRR  Resp: non-labored breathing on room air  Abd: Soft, mild tenderness in RLQ, non distended, soft and reducible left sided ventral hernia  Ext: warm and well perfused      No intake or output data in the 24 hours ending 05/14/22 0938    Recent labs reviewed.    Recent imaging reviewed.    A/P: Ms. Fountain is a 57 yo female h/o T1DM and ESRD s/p SPKT 2000, LLQ incisional hernia s/p repair 2013 with biologic mesh with Dr. Pandey of transplant surgery, GERD, HERLINDA on CPAP, bilateral PE despite IVC filter placement currently on Xarelto (took this morning), HTN who presented to [facility] ED 5/13/22 with RLQ abdominal pain, nausea, leukocytosis and CT scan findings consistent with uncomplicated acute appendicitis. She is currently on antibiotics. Xarelto held for possible surgical intervention. Her leukocytosis improved this morning.    Continue antibiotics  Pain control PRN  CLD today and ADAT  Recommend starting heparin gtt while Xarelto is held   Ambulate as tolerated.  Surgery will continue to follow. If her pain is well controlled and she is tolerating PO will plan to discharge with PO antibiotics. If she continues to be symptomatic will make NPO at midnight and plan for appendectomy tomorrow.    Seen and discussed with chief resident, Dr. Borden, who will discuss with staff.    Jennifer Boyer MD  Surgery PGY-2  "

## 2022-05-14 NOTE — PROGRESS NOTES
[facility]    Medicine Progress Note - Hospitalist Service, GOLD TEAM 9    Date of Admission:  5/13/2022    Assessment & Plan          Delilah Fountain is a 56 year old female admitted on 5/13/2022. 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 Labs notable for leukocytosis with neutrophilic predominance. Otherwise lipase low, UA with no concern for infection, and electrolytes WNL. CT A/P notable for acute uncomplicated appendicitis. Surgery evaluated patient and did not pursue surgical intervention night of admission with patient being fully anticoagulated. Patient received ertapenem in ED, changed to Zosyn. On exam RLQ tenderness appreciated but otherwise clinically doing well, no acute abdomen.  Admitted to medicine for management of comorbidities while surgery follows.  - Surgery consulted   - CLD   - Anticoagulation as below  - Continue Zosyn    #DDKT  #pancreas transplant  Patient with history of pancreas and kidney transplant secondary to T1DM. Doing well with no issues for quite some time. No laboratory evidence of any issues.   -Continue tacrolimus  -Continue azathioprine  -Continue Bactrim prophylaxis MWF  -Consult pancreas/kidney transplant    #H/O of DVT with recurrent PE  - hold xarelto per surgery    -Surgery okay with heparin drip (low intensity, discussed with pharmacy)  - permanent IVC filter in place       Diet: NPO per Anesthesia Guidelines for Procedure/Surgery Except for: Meds    DVT Prophylaxis: Heparin drip  Toscano Catheter: Not present  Central Lines: None  Cardiac Monitoring: None  Code Status: Full Code       Disposition Plan   Expected Discharge: 05/15/2022     Anticipated discharge location:  Awaiting care coordination huddle  Delays:          The patient's care was discussed with the Bedside Nurse and Patient.    Brandon Weems MD  Hospitalist Service, GOLD TEAM [address] [place] [address]  Securely message with the Vocera Web Console (learn more here)  Text page via [facility] Paging/Directory   Please see signed in provider for up to date coverage information      Clinically Significant Risk Factors Present on Admission             # Hypoalbuminemia: Albumin = 3.1 g/dL (Ref range: 3.4 - 5.0 g/dL) on admission, will monitor as appropriate   # Coagulation Defect: home medication list includes an anticoagulant medication        ______________________________________________________________________    Interval History   Doing okay this morning.  No acute overnight events.  Still having right lower quadrant pain.  Nausea stable.  No chest pain or shortness of breath.  No fevers or chills.  No vomiting.  No bleeding per any orifice.  No changes to her bowel regimen.    4 point ROS including questioning patient regarding cardiovascular symptoms, gastrointestinal symptoms, respiratory symptoms, constitutional symptoms, and hematology / bleeding symptoms and all were negative except as noted in interval history as above.     Data reviewed today: I reviewed all medications, new labs and imaging results over the last 24 hours. I personally reviewed no images or EKG's today.    Physical Exam   Vital Signs: Temp: (!) 96.1  F (35.6  C) Temp src: Oral BP: (!) 147/80 Pulse: 76   Resp: 16 SpO2: 95 % O2 Device: None (Room air) Oxygen Delivery: 2 LPM  Weight: 0 lbs 0 oz  General: alert, pleasant, NAD  Cardiac: RRR, no clicks rubs or murmurs  Respiratory: good respiratory effort, CTAB in all lung fields bilaterally, no wheezes/rhonchi/rales or crackles appreciated, air movement throughout  GI: tenderness  to palpation in RLQ (stable), no rebound tenderness or guarding, BS normoactive, nondistended  Skin: no erythema, open skin wounds, or concerning lesions  Extremities: moving all extremities equally, no peripheral edema, brisk pulses, warm and well perfused  Neuro: alert and oriented    Data   Recent Labs   Lab 05/14/22  0725 05/13/22  1302   WBC 11.0 13.7*   HGB 14.3 14.6   MCV 98 95    288   INR 1.22*  --     142   POTASSIUM 3.6 3.7   CHLORIDE 112* 110*   CO2 24 22   BUN 10 8   CR 0.62 0.65   ANIONGAP 7 10   RENATO 9.0 9.1   * 160*   ALBUMIN  --  3.1*   PROTTOTAL  --  7.9   BILITOTAL  --  0.4   ALKPHOS  --  77   ALT  --  16   AST  --  12   LIPASE  --  62*     Recent Results (from the past 24 hour(s))   CT Abdomen Pelvis w Contrast    Addendum: 5/13/2022    Addendum: Dictation error in impression point 3.    Corrected   impression:    3. Colon containing ventral hernia above the umbilicus without  evidence of obstruction.    I have personally reviewed the examination and initial interpretation  and I agree with the findings.    CONNIE JIMENEZ MD         SYSTEM ID:  [id number]      Narrative    EXAMINATION: CT ABDOMEN PELVIS W CONTRAST, 5/13/2022 3:21 PM    TECHNIQUE:  Helical CT of the abdomen and pelvis with IV contrast.  Coronal and sagittal reformatted images were created, archived and  reviewed at the workstation for further assessment.    COMPARISON: CT 11/20/2015    HISTORY: RLQ pain, concern for appendicitis; hx kidney/panc transplant    FINDINGS:     The appendix demonstrates increased enhancement, wall thickening, and  periappendiceal stranding (series 4 images 41-53, series 6 images  312-336). Caliber approximately 9 mm. No evidence of perforation or  abscess.    Diffuse hepatic steatosis. Unremarkable gallbladder and biliary tree.  Normal spleen. Mild fatty atrophy of the pancreas. No adrenal nodules.  Atrophic native kidneys.     Unremarkable appearance of the left lower quadrant  transplant kidney.  Atrophic appearance of the right lower quadrant adjacent transplant  pancreas.    Colon containing ventral hernia above the umbilicus without evidence  of obstruction. Left spigelian hernia containing small and large bowel  and omental fat without evidence of obstruction.    No or intraperitoneal free air, ascites, or organized fluid  collections.    IVC filter in the infrarenal IVC.    Unremarkable uterus and adnexa. Normal-appearing bladder.    No acute or suspicious osseous abnormality.    The lung bases are clear.      Impression    IMPRESSION:    1. Acute uncomplicated appendicitis.  2. Unremarkable appearance of the left lower quadrant transplant  kidney and atrophic appearance of the right lower quadrant transplant  pancreas.  3. Colon containing ventral hernia above the umbilicus without  evidence of dissection.  4. Large left spigelian hernia containing small and large bowel  without evidence of obstruction.  5. Hepatic steatosis.  6. IVC filter.    I have personally reviewed the examination and initial interpretation  and I agree with the findings.    CONNIE JIMENEZ MD         SYSTEM ID:  [id number]     Medications     sodium chloride 125 mL/hr at 05/14/22 0508       atorvastatin  10 mg Oral Daily     azaTHIOprine  150 mg Oral Daily     piperacillin-tazobactam  4.5 g Intravenous Q6H     sodium chloride (PF)  3 mL Intracatheter Q8H     [START ON 5/16/2022] sulfamethoxazole-trimethoprim  1 tablet Oral Q Mon Wed Fri AM     tacrolimus  2 mg Oral BID

## 2022-05-14 NOTE — PLAN OF CARE
Status: Appendicitis   Vitals: VSS  Neuros: Intact  IV: PIV@125ml/hr NS. Heparin @1200units/hr, recheck ptt @2030   Diet: Clears. NPO at midnight  Bowel status: no BM   : voids spont  Skin: intact  Pain: no c/o pain  Activity: up ad dayron   Plan: Add on OR tomorrow for appendectomy. Continue to follow POC

## 2022-05-14 NOTE — CONSULTS
[facility]  Transplant Nephrology Consult  Date of Admission:  5/13/2022  Today's Date: 05/14/2022  Requesting physician: Brandon Weems MD    Recommendations:  -Continue current immunosuppression; if she is unable to keep pills down, let us know and we can come up with alternative regimens via sublingual/IV formulations     -Recommend dialogue between General Surgery/Transplant Surgery dialogue re: opinion on acute appendicitis management.       Assessment & Plan      #Acute appendicitis    -Appreciate multidisciplinary management from Medicine/General Surgery/Transplant Surgery    -Agree with antibiotics; pip-tazo or cefepime/flagyl probably sufficient     # DDKT (SPK): Stable              - Baseline Cr ~ 0.6- 0.7 mg/dL               - Proteinuria: Normal (<0.2 grams)              - Date DSA Last Checked: 2017      Latest DSA: No              - BK Viremia: No              - Kidney Tx Biopsy: No     # Pancreas Tx (SPK):               - Pancreatic Exocrine Drainage: Enteric drained                     - Blood glucose: Near euglycemia         But occasionally elevated fasting sugar.   On insulin: No              - HbA1c: Stable      Latest HbA1c: 6 %              - Pancreatic enzymes: Stable              - Date DSA Last Checked: 2017                    Latest DSA: No              - Pancreas Tx Biopsy: No     # Immunosuppression: Tacrolimus immediate release (goal 5-8) and Azathioprine (dose 150 mg daily)              - Changes: No     # Infection Prophylaxis:   - PJP: Sulfa/TMP (Bactrim)     # Hypertension: Controlled;   Goal BP: < 130/80 weight has been in the range  284 - 300 lbs. Now it is 311 lbs              - Changes: No     # Prediabetes : Glucose generally running ~ low  135 - 139  . A1C 6 %               - Management as per primary team.     # DVT recurrent with history of PE: on Rivaroxaban,    -On hold due to acute appendicitis    -Has  IVC filter  (permanent)   -SCDs reasonable      # Transplant History:  Etiology of Kidney Failure: Diabetes   Tx: SPK  Transplant: 7/12/2000 (Kidney / Pancreas)  Significant changes in immunosuppression: On azathioprine  Significant transplant-related complications: None     Recommendations were communicated to the primary team via this note.    Armando Chung MD  Transplant Nephrology   Pager: [phone]    REASON FOR CONSULT   Kidney, pancreas transplant; immunosuppression management     History of Present Illness   Delilah Fountain is a 56 year old female with a history of SPK, hernia repair (performed by Dr. Pandey) recurrent DVT/PE on anticoagulation + IVC filter, BMI 47 admitted with acute appendicitis.     She is being medically managed due to complex surgical history with SPKs, hernias, obesity. She has been able to receive and keep down PO immunosuppression.     She notes pain with movement but is okay at rest. Leukocytosis improved to 11K from 13K. General surgery following and determining if able to discharge on PO antibiotics if tolerating oral intake vs appendectomy. She notes nausea, but otherwise feels well.       Review of Systems    The 10 point Review of Systems is negative other than noted in the HPI or here.     Past Medical History    I have reviewed this patient's medical history and updated it with pertinent information if needed.   Past Medical History:   Diagnosis Date     Allergic rhinitis      Anxiety      Gastro-oesophageal reflux disease      Sleep apnea     uses cpap machine       Past Surgical History   I have reviewed this patient's surgical history and updated it with pertinent information if needed.  Past Surgical History:   Procedure Laterality Date     EYE SURGERY      left and right cataract surgeries     HERNIA REPAIR      ventral     HERNIORRHAPHY INCISIONAL (LOCATION)  3/21/2013    Procedure: HERNIORRHAPHY INCISIONAL (LOCATION);  Open Incisional Hernia  Anesthesia General  "with block;  Surgeon: Rhonda Pandey MD;  Location: [facility] OR     TRANSPLANT      kidney/pancreas       Family History   I have reviewed this patient's family history and updated it with pertinent information if needed.   No family history of cancer, heart disease       Social History   I have reviewed this patient's social history and updated it with pertinent information if needed. Delilah Fountain  reports that she has never smoked. She has never used smokeless tobacco. She reports current alcohol use. She reports that she does not use drugs.    Allergies   Allergies   Allergen Reactions     Wellbutrin [Bupropion] Other (See Comments)     Pt. Reports that it made her more weird     Prior to Admission Medications     atorvastatin  10 mg Oral Daily     azaTHIOprine  150 mg Oral Daily     piperacillin-tazobactam  4.5 g Intravenous Q6H     sodium chloride (PF)  3 mL Intracatheter Q8H     [START ON 2022] sulfamethoxazole-trimethoprim  1 tablet Oral Q  AM     tacrolimus  2 mg Oral BID       sodium chloride 125 mL/hr at 22 0508       Physical Exam   Temp  Av.8  F (36.6  C)  Min: 96.1  F (35.6  C)  Max: 98.8  F (37.1  C)      Pulse  Av.7  Min: 76  Max: 111 Resp  Av.6  Min: 16  Max: 18  SpO2  Av.3 %  Min: 92 %  Max: 100 %     BP (!) 147/80 (BP Location: Left arm)   Pulse 76   Temp (!) 96.1  F (35.6  C) (Oral)   Resp 16   Ht 1.727 m (5' 8\")   [facility] 2015   SpO2 95%   BMI 47.29 kg/m      Admit       GENERAL APPEARANCE: alert and no distress  HENT: mouth without ulcers or lesions  LYMPHATICS: no cervical or supraclavicular nodes  RESP: lungs clear to auscultation - no rales, rhonchi or wheezes  CV: regular rhythm, normal rate, no rub, no murmur  EDEMA: no LE edema bilaterally  ABDOMEN: Obese soft, mild tenderness in RLQ, non distended, soft and reducible left sided ventral hernia  MS: extremities normal - no gross deformities noted, no evidence of inflammation in " joints, no muscle tenderness  SKIN: no rash    Data   CMP  Recent Labs   Lab 05/14/22  0725 05/13/22  1302    142   POTASSIUM 3.6 3.7   CHLORIDE 112* 110*   CO2 24 22   ANIONGAP 7 10   * 160*   BUN 10 8   CR 0.62 0.65   GFRESTIMATED >90 >90   RENATO 9.0 9.1   PROTTOTAL  --  7.9   ALBUMIN  --  3.1*   BILITOTAL  --  0.4   ALKPHOS  --  77   AST  --  12   ALT  --  16     CBC  Recent Labs   Lab 05/14/22  0725 05/13/22  1302   HGB 14.3 14.6   WBC 11.0 13.7*   RBC 4.55 4.70   HCT 44.5 44.8   MCV 98 95   MCH 31.4 31.1   MCHC 32.1 32.6   RDW 14.1 13.7    288     INR  Recent Labs   Lab 05/14/22  0725   INR 1.22*     ABGNo lab results found in last 7 days.   Urine Studies  Recent Labs   Lab Test 05/13/22  1352 02/28/20  2347 11/20/15  1449   COLOR Yellow Yellow Yellow   APPEARANCE Clear Clear Clear   URINEGLC Negative Negative Negative   URINEBILI Negative Negative Negative   URINEKETONE Negative Negative 10*   SG 1.015 1.016 1.010   UBLD Small* Small* Small*   URINEPH 7.0 6.5 6.5   PROTEIN 200 * Negative 30*   NITRITE Negative Negative Negative   LEUKEST Negative Negative Negative   RBCU 7* 5* 6*   WBCU 3 1 1     Recent Labs   Lab Test 06/30/16  1021 03/31/15  0843 11/26/14  0903   UTPG 0.06 Unable to calculate due to low value Unable to calculate due to low value     PTH  Recent Labs   Lab Test 08/25/17  1107   PTHI 62     Iron Studies  No lab results found.    IMAGING:  All imaging studies reviewed by me.

## 2022-05-15 ENCOUNTER — ANESTHESIA (OUTPATIENT)
Dept: SURGERY | Facility: CLINIC | Age: 57
DRG: 336 | End: 2022-05-15
Payer: COMMERCIAL

## 2022-05-15 LAB
ALBUMIN SERPL-MCNC: 2.7 G/DL (ref 3.4–5)
ALP SERPL-CCNC: 62 U/L (ref 40–150)
ALT SERPL W P-5'-P-CCNC: 14 U/L (ref 0–50)
ANION GAP SERPL CALCULATED.3IONS-SCNC: 7 MMOL/L (ref 3–14)
APTT PPP: 31 SECONDS (ref 22–38)
AST SERPL W P-5'-P-CCNC: 10 U/L (ref 0–45)
BACTERIA UR CULT: NORMAL
BILIRUB SERPL-MCNC: 0.4 MG/DL (ref 0.2–1.3)
BUN SERPL-MCNC: 8 MG/DL (ref 7–30)
CALCIUM SERPL-MCNC: 8.5 MG/DL (ref 8.5–10.1)
CHLORIDE BLD-SCNC: 113 MMOL/L (ref 94–109)
CO2 SERPL-SCNC: 24 MMOL/L (ref 20–32)
CREAT SERPL-MCNC: 0.71 MG/DL (ref 0.52–1.04)
ERYTHROCYTE [DISTWIDTH] IN BLOOD BY AUTOMATED COUNT: 14 % (ref 10–15)
GFR SERPL CREATININE-BSD FRML MDRD: >90 ML/MIN/1.73M2
GLUCOSE BLD-MCNC: 117 MG/DL (ref 70–99)
HCT VFR BLD AUTO: 41 % (ref 35–47)
HGB BLD-MCNC: 13.5 G/DL (ref 11.7–15.7)
MCH RBC QN AUTO: 32 PG (ref 26.5–33)
MCHC RBC AUTO-ENTMCNC: 32.9 G/DL (ref 31.5–36.5)
MCV RBC AUTO: 97 FL (ref 78–100)
PLATELET # BLD AUTO: 237 10E3/UL (ref 150–450)
POTASSIUM BLD-SCNC: 3.5 MMOL/L (ref 3.4–5.3)
PROT SERPL-MCNC: 7.1 G/DL (ref 6.8–8.8)
RBC # BLD AUTO: 4.22 10E6/UL (ref 3.8–5.2)
SODIUM SERPL-SCNC: 144 MMOL/L (ref 133–144)
WBC # BLD AUTO: 7.6 10E3/UL (ref 4–11)

## 2022-05-15 PROCEDURE — 258N000003 HC RX IP 258 OP 636: Performed by: STUDENT IN AN ORGANIZED HEALTH CARE EDUCATION/TRAINING PROGRAM

## 2022-05-15 PROCEDURE — 0DNW4ZZ RELEASE PERITONEUM, PERCUTANEOUS ENDOSCOPIC APPROACH: ICD-10-PCS | Performed by: SURGERY

## 2022-05-15 PROCEDURE — 250N000025 HC SEVOFLURANE, PER MIN: Performed by: SURGERY

## 2022-05-15 PROCEDURE — 250N000011 HC RX IP 250 OP 636: Performed by: SURGERY

## 2022-05-15 PROCEDURE — 80053 COMPREHEN METABOLIC PANEL: CPT | Performed by: STUDENT IN AN ORGANIZED HEALTH CARE EDUCATION/TRAINING PROGRAM

## 2022-05-15 PROCEDURE — 250N000013 HC RX MED GY IP 250 OP 250 PS 637: Performed by: STUDENT IN AN ORGANIZED HEALTH CARE EDUCATION/TRAINING PROGRAM

## 2022-05-15 PROCEDURE — 250N000011 HC RX IP 250 OP 636: Performed by: STUDENT IN AN ORGANIZED HEALTH CARE EDUCATION/TRAINING PROGRAM

## 2022-05-15 PROCEDURE — 99233 SBSQ HOSP IP/OBS HIGH 50: CPT | Performed by: STUDENT IN AN ORGANIZED HEALTH CARE EDUCATION/TRAINING PROGRAM

## 2022-05-15 PROCEDURE — 88304 TISSUE EXAM BY PATHOLOGIST: CPT | Mod: 26 | Performed by: PATHOLOGY

## 2022-05-15 PROCEDURE — 88304 TISSUE EXAM BY PATHOLOGIST: CPT | Mod: TC | Performed by: SURGERY

## 2022-05-15 PROCEDURE — 250N000012 HC RX MED GY IP 250 OP 636 PS 637: Performed by: STUDENT IN AN ORGANIZED HEALTH CARE EDUCATION/TRAINING PROGRAM

## 2022-05-15 PROCEDURE — 710N000010 HC RECOVERY PHASE 1, LEVEL 2, PER MIN: Performed by: SURGERY

## 2022-05-15 PROCEDURE — 120N000002 HC R&B MED SURG/OB UMMC

## 2022-05-15 PROCEDURE — 44970 LAPAROSCOPY APPENDECTOMY: CPT | Mod: 22 | Performed by: SURGERY

## 2022-05-15 PROCEDURE — 36415 COLL VENOUS BLD VENIPUNCTURE: CPT | Performed by: STUDENT IN AN ORGANIZED HEALTH CARE EDUCATION/TRAINING PROGRAM

## 2022-05-15 PROCEDURE — 272N000001 HC OR GENERAL SUPPLY STERILE: Performed by: SURGERY

## 2022-05-15 PROCEDURE — 360N000076 HC SURGERY LEVEL 3, PER MIN: Performed by: SURGERY

## 2022-05-15 PROCEDURE — 85730 THROMBOPLASTIN TIME PARTIAL: CPT | Performed by: STUDENT IN AN ORGANIZED HEALTH CARE EDUCATION/TRAINING PROGRAM

## 2022-05-15 PROCEDURE — 85027 COMPLETE CBC AUTOMATED: CPT | Performed by: STUDENT IN AN ORGANIZED HEALTH CARE EDUCATION/TRAINING PROGRAM

## 2022-05-15 PROCEDURE — 250N000009 HC RX 250: Performed by: STUDENT IN AN ORGANIZED HEALTH CARE EDUCATION/TRAINING PROGRAM

## 2022-05-15 PROCEDURE — 0DTJ4ZZ RESECTION OF APPENDIX, PERCUTANEOUS ENDOSCOPIC APPROACH: ICD-10-PCS | Performed by: SURGERY

## 2022-05-15 PROCEDURE — 999N000141 HC STATISTIC PRE-PROCEDURE NURSING ASSESSMENT: Performed by: SURGERY

## 2022-05-15 PROCEDURE — 999N000157 HC STATISTIC RCP TIME EA 10 MIN

## 2022-05-15 PROCEDURE — 370N000017 HC ANESTHESIA TECHNICAL FEE, PER MIN: Performed by: SURGERY

## 2022-05-15 PROCEDURE — 250N000011 HC RX IP 250 OP 636: Performed by: INTERNAL MEDICINE

## 2022-05-15 RX ORDER — PROCHLORPERAZINE MALEATE 10 MG
10 TABLET ORAL EVERY 6 HOURS PRN
Status: DISCONTINUED | OUTPATIENT
Start: 2022-05-15 | End: 2022-05-16 | Stop reason: HOSPADM

## 2022-05-15 RX ORDER — SODIUM CHLORIDE, SODIUM LACTATE, POTASSIUM CHLORIDE, CALCIUM CHLORIDE 600; 310; 30; 20 MG/100ML; MG/100ML; MG/100ML; MG/100ML
INJECTION, SOLUTION INTRAVENOUS CONTINUOUS
Status: DISCONTINUED | OUTPATIENT
Start: 2022-05-15 | End: 2022-05-15 | Stop reason: HOSPADM

## 2022-05-15 RX ORDER — LIDOCAINE HYDROCHLORIDE 20 MG/ML
INJECTION, SOLUTION INFILTRATION; PERINEURAL PRN
Status: DISCONTINUED | OUTPATIENT
Start: 2022-05-15 | End: 2022-05-15

## 2022-05-15 RX ORDER — HYDROMORPHONE HCL IN WATER/PF 6 MG/30 ML
0.2 PATIENT CONTROLLED ANALGESIA SYRINGE INTRAVENOUS EVERY 5 MIN PRN
Status: DISCONTINUED | OUTPATIENT
Start: 2022-05-15 | End: 2022-05-15 | Stop reason: HOSPADM

## 2022-05-15 RX ORDER — HYDROMORPHONE HCL IN WATER/PF 6 MG/30 ML
0.4 PATIENT CONTROLLED ANALGESIA SYRINGE INTRAVENOUS
Status: DISCONTINUED | OUTPATIENT
Start: 2022-05-15 | End: 2022-05-16

## 2022-05-15 RX ORDER — NALOXONE HYDROCHLORIDE 0.4 MG/ML
0.2 INJECTION, SOLUTION INTRAMUSCULAR; INTRAVENOUS; SUBCUTANEOUS
Status: DISCONTINUED | OUTPATIENT
Start: 2022-05-15 | End: 2022-05-16 | Stop reason: HOSPADM

## 2022-05-15 RX ORDER — BUPIVACAINE HYDROCHLORIDE 2.5 MG/ML
INJECTION, SOLUTION INFILTRATION; PERINEURAL PRN
Status: DISCONTINUED | OUTPATIENT
Start: 2022-05-15 | End: 2022-05-16 | Stop reason: HOSPADM

## 2022-05-15 RX ORDER — FENTANYL CITRATE 50 UG/ML
25 INJECTION, SOLUTION INTRAMUSCULAR; INTRAVENOUS EVERY 5 MIN PRN
Status: DISCONTINUED | OUTPATIENT
Start: 2022-05-15 | End: 2022-05-15 | Stop reason: HOSPADM

## 2022-05-15 RX ORDER — LIDOCAINE 40 MG/G
CREAM TOPICAL
Status: DISCONTINUED | OUTPATIENT
Start: 2022-05-15 | End: 2022-05-16 | Stop reason: HOSPADM

## 2022-05-15 RX ORDER — OXYCODONE HYDROCHLORIDE 10 MG/1
10 TABLET ORAL EVERY 4 HOURS PRN
Status: DISCONTINUED | OUTPATIENT
Start: 2022-05-15 | End: 2022-05-16 | Stop reason: HOSPADM

## 2022-05-15 RX ORDER — PROPOFOL 10 MG/ML
INJECTION, EMULSION INTRAVENOUS PRN
Status: DISCONTINUED | OUTPATIENT
Start: 2022-05-15 | End: 2022-05-15

## 2022-05-15 RX ORDER — ONDANSETRON 2 MG/ML
INJECTION INTRAMUSCULAR; INTRAVENOUS PRN
Status: DISCONTINUED | OUTPATIENT
Start: 2022-05-15 | End: 2022-05-15

## 2022-05-15 RX ORDER — NALOXONE HYDROCHLORIDE 0.4 MG/ML
0.4 INJECTION, SOLUTION INTRAMUSCULAR; INTRAVENOUS; SUBCUTANEOUS
Status: DISCONTINUED | OUTPATIENT
Start: 2022-05-15 | End: 2022-05-16 | Stop reason: HOSPADM

## 2022-05-15 RX ORDER — ONDANSETRON 4 MG/1
4 TABLET, ORALLY DISINTEGRATING ORAL EVERY 30 MIN PRN
Status: DISCONTINUED | OUTPATIENT
Start: 2022-05-15 | End: 2022-05-15 | Stop reason: HOSPADM

## 2022-05-15 RX ORDER — AMOXICILLIN 250 MG
1 CAPSULE ORAL 2 TIMES DAILY
Status: DISCONTINUED | OUTPATIENT
Start: 2022-05-15 | End: 2022-05-16 | Stop reason: HOSPADM

## 2022-05-15 RX ORDER — SODIUM CHLORIDE, SODIUM LACTATE, POTASSIUM CHLORIDE, CALCIUM CHLORIDE 600; 310; 30; 20 MG/100ML; MG/100ML; MG/100ML; MG/100ML
INJECTION, SOLUTION INTRAVENOUS CONTINUOUS
Status: CANCELLED | OUTPATIENT
Start: 2022-05-15

## 2022-05-15 RX ORDER — ENOXAPARIN SODIUM 100 MG/ML
40 INJECTION SUBCUTANEOUS EVERY 24 HOURS
Status: DISCONTINUED | OUTPATIENT
Start: 2022-05-15 | End: 2022-05-16 | Stop reason: HOSPADM

## 2022-05-15 RX ORDER — HEPARIN SODIUM 5000 [USP'U]/.5ML
5000 INJECTION, SOLUTION INTRAVENOUS; SUBCUTANEOUS EVERY 12 HOURS
Status: DISCONTINUED | OUTPATIENT
Start: 2022-05-15 | End: 2022-05-15

## 2022-05-15 RX ORDER — ONDANSETRON 2 MG/ML
4 INJECTION INTRAMUSCULAR; INTRAVENOUS EVERY 6 HOURS PRN
Status: DISCONTINUED | OUTPATIENT
Start: 2022-05-15 | End: 2022-05-16 | Stop reason: HOSPADM

## 2022-05-15 RX ORDER — DEXAMETHASONE SODIUM PHOSPHATE 4 MG/ML
INJECTION, SOLUTION INTRA-ARTICULAR; INTRALESIONAL; INTRAMUSCULAR; INTRAVENOUS; SOFT TISSUE PRN
Status: DISCONTINUED | OUTPATIENT
Start: 2022-05-15 | End: 2022-05-15

## 2022-05-15 RX ORDER — ONDANSETRON 2 MG/ML
4 INJECTION INTRAMUSCULAR; INTRAVENOUS EVERY 30 MIN PRN
Status: DISCONTINUED | OUTPATIENT
Start: 2022-05-15 | End: 2022-05-15 | Stop reason: HOSPADM

## 2022-05-15 RX ORDER — BISACODYL 10 MG
10 SUPPOSITORY, RECTAL RECTAL DAILY PRN
Status: DISCONTINUED | OUTPATIENT
Start: 2022-05-15 | End: 2022-05-16 | Stop reason: HOSPADM

## 2022-05-15 RX ORDER — OXYCODONE HYDROCHLORIDE 5 MG/1
5 TABLET ORAL EVERY 4 HOURS PRN
Status: DISCONTINUED | OUTPATIENT
Start: 2022-05-15 | End: 2022-05-15 | Stop reason: HOSPADM

## 2022-05-15 RX ORDER — SODIUM CHLORIDE, SODIUM LACTATE, POTASSIUM CHLORIDE, CALCIUM CHLORIDE 600; 310; 30; 20 MG/100ML; MG/100ML; MG/100ML; MG/100ML
INJECTION, SOLUTION INTRAVENOUS CONTINUOUS PRN
Status: DISCONTINUED | OUTPATIENT
Start: 2022-05-15 | End: 2022-05-15

## 2022-05-15 RX ORDER — ACETAMINOPHEN 325 MG/1
650 TABLET ORAL EVERY 4 HOURS PRN
Status: DISCONTINUED | OUTPATIENT
Start: 2022-05-18 | End: 2022-05-16 | Stop reason: HOSPADM

## 2022-05-15 RX ORDER — POLYETHYLENE GLYCOL 3350 17 G/17G
17 POWDER, FOR SOLUTION ORAL DAILY
Status: DISCONTINUED | OUTPATIENT
Start: 2022-05-16 | End: 2022-05-16 | Stop reason: HOSPADM

## 2022-05-15 RX ORDER — ACETAMINOPHEN 325 MG/1
975 TABLET ORAL EVERY 8 HOURS
Status: DISCONTINUED | OUTPATIENT
Start: 2022-05-15 | End: 2022-05-16 | Stop reason: HOSPADM

## 2022-05-15 RX ORDER — OXYCODONE HYDROCHLORIDE 5 MG/1
5 TABLET ORAL EVERY 4 HOURS PRN
Status: DISCONTINUED | OUTPATIENT
Start: 2022-05-15 | End: 2022-05-16 | Stop reason: HOSPADM

## 2022-05-15 RX ORDER — FENTANYL CITRATE 50 UG/ML
INJECTION, SOLUTION INTRAMUSCULAR; INTRAVENOUS PRN
Status: DISCONTINUED | OUTPATIENT
Start: 2022-05-15 | End: 2022-05-15

## 2022-05-15 RX ORDER — ONDANSETRON 4 MG/1
4 TABLET, ORALLY DISINTEGRATING ORAL EVERY 6 HOURS PRN
Status: DISCONTINUED | OUTPATIENT
Start: 2022-05-15 | End: 2022-05-16 | Stop reason: HOSPADM

## 2022-05-15 RX ORDER — ENOXAPARIN SODIUM 100 MG/ML
40 INJECTION SUBCUTANEOUS EVERY 24 HOURS
Status: DISCONTINUED | OUTPATIENT
Start: 2022-05-16 | End: 2022-05-15

## 2022-05-15 RX ORDER — LIDOCAINE 40 MG/G
CREAM TOPICAL
Status: CANCELLED | OUTPATIENT
Start: 2022-05-15

## 2022-05-15 RX ORDER — HYDROMORPHONE HCL IN WATER/PF 6 MG/30 ML
0.2 PATIENT CONTROLLED ANALGESIA SYRINGE INTRAVENOUS
Status: DISCONTINUED | OUTPATIENT
Start: 2022-05-15 | End: 2022-05-16

## 2022-05-15 RX ADMIN — VENLAFAXINE HYDROCHLORIDE 150 MG: 150 CAPSULE, EXTENDED RELEASE ORAL at 11:54

## 2022-05-15 RX ADMIN — LIDOCAINE HYDROCHLORIDE 100 MG: 20 INJECTION, SOLUTION INFILTRATION; PERINEURAL at 07:49

## 2022-05-15 RX ADMIN — AZATHIOPRINE 150 MG: 50 TABLET ORAL at 11:51

## 2022-05-15 RX ADMIN — ACETAMINOPHEN 975 MG: 325 TABLET ORAL at 11:47

## 2022-05-15 RX ADMIN — FENTANYL CITRATE 100 MCG: 50 INJECTION, SOLUTION INTRAMUSCULAR; INTRAVENOUS at 08:03

## 2022-05-15 RX ADMIN — PIPERACILLIN AND TAZOBACTAM 4.5 G: 4; .5 INJECTION, POWDER, FOR SOLUTION INTRAVENOUS at 08:20

## 2022-05-15 RX ADMIN — FENTANYL CITRATE 50 MCG: 50 INJECTION, SOLUTION INTRAMUSCULAR; INTRAVENOUS at 08:29

## 2022-05-15 RX ADMIN — TACROLIMUS 2 MG: 1 CAPSULE, GELATIN COATED ORAL at 19:41

## 2022-05-15 RX ADMIN — FENTANYL CITRATE 50 MCG: 50 INJECTION, SOLUTION INTRAMUSCULAR; INTRAVENOUS at 08:43

## 2022-05-15 RX ADMIN — PHENYLEPHRINE HYDROCHLORIDE 50 MCG: 10 INJECTION INTRAVENOUS at 08:24

## 2022-05-15 RX ADMIN — ATORVASTATIN CALCIUM 10 MG: 10 TABLET, FILM COATED ORAL at 11:50

## 2022-05-15 RX ADMIN — ONDANSETRON 4 MG: 4 TABLET, ORALLY DISINTEGRATING ORAL at 11:15

## 2022-05-15 RX ADMIN — ENOXAPARIN SODIUM 40 MG: 40 INJECTION SUBCUTANEOUS at 21:15

## 2022-05-15 RX ADMIN — DEXAMETHASONE SODIUM PHOSPHATE 4 MG: 4 INJECTION, SOLUTION INTRA-ARTICULAR; INTRALESIONAL; INTRAMUSCULAR; INTRAVENOUS; SOFT TISSUE at 08:24

## 2022-05-15 RX ADMIN — ROCURONIUM BROMIDE 20 MG: 50 INJECTION, SOLUTION INTRAVENOUS at 08:18

## 2022-05-15 RX ADMIN — VENLAFAXINE HYDROCHLORIDE 150 MG: 150 CAPSULE, EXTENDED RELEASE ORAL at 19:41

## 2022-05-15 RX ADMIN — SODIUM CHLORIDE, POTASSIUM CHLORIDE, SODIUM LACTATE AND CALCIUM CHLORIDE: 600; 310; 30; 20 INJECTION, SOLUTION INTRAVENOUS at 07:49

## 2022-05-15 RX ADMIN — PHENYLEPHRINE HYDROCHLORIDE 100 MCG: 10 INJECTION INTRAVENOUS at 09:11

## 2022-05-15 RX ADMIN — DOCUSATE SODIUM 50 MG AND SENNOSIDES 8.6 MG 1 TABLET: 8.6; 5 TABLET, FILM COATED ORAL at 11:47

## 2022-05-15 RX ADMIN — ONDANSETRON 4 MG: 2 INJECTION INTRAMUSCULAR; INTRAVENOUS at 09:35

## 2022-05-15 RX ADMIN — PIPERACILLIN AND TAZOBACTAM 4.5 G: 4; .5 INJECTION, POWDER, FOR SOLUTION INTRAVENOUS at 14:39

## 2022-05-15 RX ADMIN — HYDROMORPHONE HYDROCHLORIDE 0.2 MG: 0.2 INJECTION, SOLUTION INTRAMUSCULAR; INTRAVENOUS; SUBCUTANEOUS at 10:28

## 2022-05-15 RX ADMIN — Medication 100 MG: at 08:04

## 2022-05-15 RX ADMIN — PIPERACILLIN AND TAZOBACTAM 4.5 G: 4; .5 INJECTION, POWDER, FOR SOLUTION INTRAVENOUS at 19:41

## 2022-05-15 RX ADMIN — PROPOFOL 160 MG: 10 INJECTION, EMULSION INTRAVENOUS at 08:03

## 2022-05-15 RX ADMIN — SUGAMMADEX 200 MG: 100 INJECTION, SOLUTION INTRAVENOUS at 09:50

## 2022-05-15 RX ADMIN — PIPERACILLIN AND TAZOBACTAM 4.5 G: 4; .5 INJECTION, POWDER, FOR SOLUTION INTRAVENOUS at 02:25

## 2022-05-15 RX ADMIN — SODIUM CHLORIDE: 9 INJECTION, SOLUTION INTRAVENOUS at 21:15

## 2022-05-15 RX ADMIN — ROCURONIUM BROMIDE 20 MG: 50 INJECTION, SOLUTION INTRAVENOUS at 09:03

## 2022-05-15 RX ADMIN — TACROLIMUS 2 MG: 1 CAPSULE, GELATIN COATED ORAL at 11:51

## 2022-05-15 ASSESSMENT — ACTIVITIES OF DAILY LIVING (ADL)
ADLS_ACUITY_SCORE: 22

## 2022-05-15 NOTE — BRIEF OP NOTE
[facility]    Brief Operative Note    Pre-operative diagnosis: Acute appendicitis, unspecified acute appendicitis type [K35.80]  Post-operative diagnosis Same as pre-operative diagnosis    Procedure: Procedure(s):  APPENDECTOMY, LAPAROSCOPIC with lysis of Adesions  Surgeon: Surgeon(s) and Role:     * Alban Lea MD - Primary     * Syd Borden MD - Resident - Assisting  Anesthesia: General   Estimated Blood Loss: 5ml    Drains: None  Specimens:   ID Type Source Tests Collected by Time Destination   1 : appendix Tissue Appendix SURGICAL PATHOLOGY EXAM Alban Lea MD 5/15/2022  9:32 AM      Findings:   suppurative appendicitis, extensive lysis of adhesions.  Complications: none.  Implants: * No implants in log *      Start DVT ppx tonight  Okay to restart DOAC on POD2, hold therapeutic level anticoagulation until then.  Diet as tolerated  Continue 24 hrs post-operative antibiotics

## 2022-05-15 NOTE — PROGRESS NOTES
"Post Op Check    05/15/2022    Delilah Fountain is a 56 year old female with  now POD#0 s/p lap appendectomy.    Pt reports minimal to no pain. She is eating, no nausea. Denies SOB, chest pain, or dizziness. No BM but is passing gas.    BP (!) 159/81 (BP Location: Right arm)   Pulse 77   Temp 97.7  F (36.5  C) (Axillary)   Resp 16   Ht 1.727 m (5' 8\")   LMP 11/06/2015   SpO2 94%   BMI 47.29 kg/m      Gen: A&O x3, NAD  Chest: breathing non-labored  Abdomen: soft, non-tender, non-distended  Incision: clean, dry, intact  Extremities: warm and well perfused    A/P: No acute post-op issues. Continue plan of care per primary team. Please call with any questions.      Amor Phillips MD  Surgery PGY1      "

## 2022-05-15 NOTE — ANESTHESIA PREPROCEDURE EVALUATION
Anesthesia Pre-Procedure Evaluation    Patient: Delilah Fountain   MRN: [id number] : 1965        Procedure : Procedure(s):  APPENDECTOMY, LAPAROSCOPIC          Past Medical History:   Diagnosis Date     Allergic rhinitis      Anxiety      Gastro-oesophageal reflux disease      Sleep apnea     uses cpap machine      Past Surgical History:   Procedure Laterality Date     EYE SURGERY      left and right cataract surgeries     HERNIA REPAIR      ventral     HERNIORRHAPHY INCISIONAL (LOCATION)  3/21/2013    Procedure: HERNIORRHAPHY INCISIONAL (LOCATION);  Open Incisional Hernia  Anesthesia General with block;  Surgeon: Rhonda Pandey MD;  Location: [facility] OR     TRANSPLANT      kidney/pancreas      Allergies   Allergen Reactions     Wellbutrin [Bupropion] Other (See Comments)     Pt. Reports that it made her more weird      Social History     Tobacco Use     Smoking status: Never Smoker     Smokeless tobacco: Never Used   Substance Use Topics     Alcohol use: Yes     Comment: rarely      Wt Readings from Last 1 Encounters:   22 141.1 kg (311 lb)        Anesthesia Evaluation   Pt has had prior anesthetic. Type: General.        ROS/MED HX  ENT/Pulmonary:     (+) sleep apnea, uses CPAP,     Neurologic:       Cardiovascular:     (+) hypertension-----Previous cardiac testing   Echo: Date: Results:    Stress Test: Date: 2020 Results:  Normal, low-risk dobutamine echocardiogram without evidence of ischemia.  The target heart rate was achieved.  Normal biventricular size, thickness, and hyperdynamic global systolic  function at baseline, LVEF=65-70%.  With low-dose dobutamine, LVEF augmented and LV cavity size decreased  appropriately.  With peak dobutamine, LVEF increased further to >70% and LV cavity size  decreased appropriately.  No regional wall motion abnormalities at rest or with dobutamine.  No angina was elicited.  ECG was equivocal for evidence of ischemia with 1cm, upsloping ST depressions  in  the inferolateral leads. Normal heart rate and blood pressure response to  dobutamine.  No significant valvular abnormalities are noted on screening Doppler exam.  The aortic root and visualized ascending aorta are normal.  ECG Reviewed: Date: Results:    Cath: Date: Results:      METS/Exercise Tolerance:     Hematologic: Comments: bilateral PE despite IVC filter placement currently on Xarelto (last dose 5/14),    (+) History of blood clots, pt is anticoagulated,     Musculoskeletal:       GI/Hepatic:     (+) GERD, appendicitis (CT scan findings consistent with uncomplicated acute appendicitis),     Renal/Genitourinary:     (+) Pt has history of transplant, date: 2000,     Endo:     (+) type I DM, Not using insulin, - not using insulin pump. Normal glucose range: 120, Obesity,     Psychiatric/Substance Use:       Infectious Disease:       Malignancy:       Other:               OUTSIDE LABS:  CBC:   Lab Results   Component Value Date    WBC 10.1 05/14/2022    WBC 11.0 05/14/2022    HGB 13.9 05/14/2022    HGB 14.3 05/14/2022    HCT 43.0 05/14/2022    HCT 44.5 05/14/2022     05/14/2022     05/14/2022     BMP:   Lab Results   Component Value Date     05/14/2022     05/13/2022    POTASSIUM 3.6 05/14/2022    POTASSIUM 3.7 05/13/2022    CHLORIDE 112 (H) 05/14/2022    CHLORIDE 110 (H) 05/13/2022    CO2 24 05/14/2022    CO2 22 05/13/2022    BUN 10 05/14/2022    BUN 8 05/13/2022    CR 0.62 05/14/2022    CR 0.65 05/13/2022     (H) 05/14/2022     (H) 05/13/2022     COAGS:   Lab Results   Component Value Date    PTT 42 (H) 05/14/2022    INR 1.22 (H) 05/14/2022     POC:   Lab Results   Component Value Date     (H) 02/18/2013    HCG Negative 03/21/2013     HEPATIC:   Lab Results   Component Value Date    ALBUMIN 3.1 (L) 05/13/2022    PROTTOTAL 7.9 05/13/2022    ALT 16 05/13/2022    AST 12 05/13/2022    ALKPHOS 77 05/13/2022    BILITOTAL 0.4 05/13/2022     OTHER:   Lab Results    Component Value Date    LACT 0.7 05/13/2022    A1C 6.0 (H) 03/02/2022    RENATO 9.0 05/14/2022    PHOS 2.5 03/24/2013    MAG 1.9 03/24/2013    LIPASE 62 (L) 05/13/2022    AMYLASE 57 03/02/2022    TSH 3.95 11/18/2016    CRP 28.0 (H) 05/13/2022       Anesthesia Plan    ASA Status:  3   NPO Status:  NPO Appropriate    Anesthesia Type: General.     - Airway: ETT      Maintenance: Balanced.   Techniques and Equipment:     - Lines/Monitors: BIS, 2nd IV     Consents    Anesthesia Plan(s) and associated risks, benefits, and realistic alternatives discussed. Questions answered and patient/representative(s) expressed understanding.    - Discussed:     - Discussed with:  Patient      - Extended Intubation/Ventilatory Support Discussed: No.      - Patient is DNR/DNI Status: No    Use of blood products discussed: No .     Postoperative Care    Pain management: IV analgesics, Oral pain medications.   PONV prophylaxis: Ondansetron (or other 5HT-3), Dexamethasone or Solumedrol     Comments:    Other Comments: S/p kidney pancreas trasnplant 20 years ago.  Per patient, mya initial course - but now normal renal function and near normal glucoses.               Jose Juan Mckeon MD

## 2022-05-15 NOTE — PLAN OF CARE
Status: Appendicitis. 5/15 appendectomy   Vitals: VSS  Neuros: Intact  IV: PIV@125ml/hr NS.   Diet: Regular  Bowel status: no BM   : voids spont  Skin: x3 lap sites to abd w/ liquid bandage  Pain: no c/o pain  Activity: up ad dayron   Plan: Continue to follow POC

## 2022-05-15 NOTE — PROGRESS NOTES
[facility]    Medicine Progress Note - Hospitalist Service, GOLD TEAM 9    Date of Admission:  5/13/2022    Assessment & Plan          Delilah Fountain is a 56 year old female admitted on 5/13/2022. 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 Labs notable for leukocytosis with neutrophilic predominance. Otherwise lipase low, UA with no concern for infection, and electrolytes WNL. CT A/P notable for acute uncomplicated appendicitis. Surgery evaluated patient and did not pursue surgical intervention night of admission with patient being fully anticoagulated. Patient received ertapenem in ED, changed to Zosyn. On exam RLQ tenderness appreciated but otherwise clinically doing well, no acute abdomen.  Admitted to medicine for management of comorbidities while surgery follows. Underwent appendectomy (inflamed and not perforated) and adhesiolysis 5/15/2022. EBL 5cc.  - Surgery consulted   - ADAT   - Anticoagulation as below  - Continue Zosyn    #DDKT  #pancreas transplant  Patient with history of pancreas and kidney transplant secondary to T1DM. Doing well with no issues for quite some time. No laboratory evidence of any issues.   -Continue tacrolimus  -Continue azathioprine  -Continue Bactrim prophylaxis MWF  -Consult pancreas/kidney transplant    #H/O of DVT with recurrent PE  - hold xarelto per surgery    -Per Surgery    -Start DVT ppx tonight    -Okay to restart DOAC on POD2, hold therapeutic level anticoagulation until then  - permanent IVC filter in place       Diet:   ADAT  DVT Prophylaxis: Pneumatic Compression Devices  [place]  Catheter: Not present  Central Lines: None  Cardiac Monitoring: None  Code Status: Full Code      Disposition Plan   Expected Discharge: 05/18/2022     Anticipated discharge location:  Awaiting care coordination huddle  Delays:          The patient's care was discussed with the Bedside Nurse and Patient.    Brandon Weems MD  Hospitalist Service, GOLD TEAM 9  [facility]  Securely message with the Vocera Web Console (learn more here)  Text page via [employer] Paging/Directory   Please see signed in provider for up to date coverage information      Clinically Significant Risk Factors Present on Admission                 ______________________________________________________________________    Interval History   Doing okay post surgery. No acute overnight events. No current nausea or vomiting. No abdominal pain. No chest pain or SOB. No fevers or chills. Would like to start diet. Discussed anticoagulation prior to hospitalization, she believes her latest one is Eliquis (was previously on Xarelto).     4 point ROS including questioning patient regarding cardiovascular symptoms, gastrointestinal symptoms, respiratory symptoms, constitutional symptoms, and hematology / bleeding symptoms and all were negative except as noted in interval history as above.     Data reviewed today: I reviewed all medications, new labs and imaging results over the last 24 hours. I personally reviewed no images or EKG's today.    Physical Exam   Vital Signs: Temp: 97.8  F (36.6  C) Temp src: Oral BP: (!) 159/97 Pulse: 80   Resp: (!) 4 SpO2: 99 % O2 Device: None (Room air) Oxygen Delivery: 10 LPM  Weight: 0 lbs 0 oz  General: alert, pleasant, NAD  Cardiac: RRR, no clicks rubs or murmurs  Respiratory: good respiratory effort, CTAB in all lung fields bilaterally, no wheezes/rhonchi/rales or crackles appreciated,  GI: no tenderness to palpation, no rebound tenderness or guarding, BS normoactive, non  distended. New port sites on abdomen (glue in place)  Extremities: moving all extremities equally, no peripheral edema, brisk pulses, warm and well perfused  Neuro: alert and oriented    Data   Recent Labs   Lab 05/15/22  0658 05/14/22  1543 05/14/22  0725 05/13/22  1302   WBC 7.6 10.1 11.0 13.7*   HGB 13.5 13.9 14.3 14.6   MCV 97 98 98 95    266 257 288   INR  --   --  1.22*  --      --  143 142   POTASSIUM 3.5  --  3.6 3.7   CHLORIDE 113*  --  112* 110*   CO2 24  --  24 22   BUN 8  --  10 8   CR 0.71  --  0.62 0.65   ANIONGAP 7  --  7 10   RENATO 8.5  --  9.0 9.1   *  --  126* 160*   ALBUMIN 2.7*  --   --  3.1*   PROTTOTAL 7.1  --   --  7.9   BILITOTAL 0.4  --   --  0.4   ALKPHOS 62  --   --  77   ALT 14  --   --  16   AST 10  --   --  12   LIPASE  --   --   --  62*     No results found for this or any previous visit (from the past 24 hour(s)).  Medications     [Held by provider] heparin Stopped (05/15/22 0505)     lactated ringers       sodium chloride 125 mL/hr at 05/14/22 2132       [Auto Hold] atorvastatin  10 mg Oral Daily     [Auto Hold] azaTHIOprine  150 mg Oral Daily     [Auto Hold] piperacillin-tazobactam  4.5 g Intravenous Q6H     [Auto Hold] sodium chloride (PF)  3 mL Intracatheter Q8H     [Auto Hold] sulfamethoxazole-trimethoprim  1 tablet Oral Q Mon Wed Fri AM     [Auto Hold] tacrolimus  2 mg Oral BID     [Auto Hold] venlafaxine  150 mg Oral BID

## 2022-05-15 NOTE — ANESTHESIA PROCEDURE NOTES
Airway       Patient location during procedure: OR       Procedure Start/Stop Times: 5/15/2022 8:07 AM  Staff -        Anesthesiologist:  Amor Asher MD       Resident/Fellow: Jose Juan Mckeon MD       Performed By: resident  Consent for Airway        Urgency: elective  Indications and Patient Condition       Indications for airway management: megan-procedural       Induction type:RSI       Mask difficulty assessment: 0 - not attempted    Final Airway Details       Final airway type: endotracheal airway       Successful airway: ETT - single  Endotracheal Airway Details        ETT size (mm): 7.0       Cuffed: yes       Successful intubation technique: video laryngoscopy       VL Blade Size: Glidescope 4       Grade View of Cords: 1       Adjucts: stylet       Position: Right       Measured from: lips       Secured at (cm): 22       Bite block used: Soft    Post intubation assessment        Placement verified by: capnometry, equal breath sounds and chest rise        Number of attempts at approach: 1       Secured with: pink tape       Ease of procedure: easy       Dentition: Intact    Medication(s) Administered   Medication Administration Time: 5/15/2022 8:07 AM

## 2022-05-15 NOTE — ANESTHESIA CARE TRANSFER NOTE
Patient: Delilah Fountain    Procedure: Procedure(s):  APPENDECTOMY, LAPAROSCOPIC with lysis of Adesions       Diagnosis: Acute appendicitis, unspecified acute appendicitis type [K35.80]  Diagnosis Additional Information: No value filed.    Anesthesia Type:   General     Note:    Oropharynx: spontaneously breathing  Level of Consciousness: awake  Oxygen Supplementation: face mask  Level of Supplemental Oxygen (L/min / FiO2): 6  Independent Airway: airway patency satisfactory and stable  Dentition: dentition unchanged  Vital Signs Stable: post-procedure vital signs reviewed and stable  Report to RN Given: handoff report given  Patient transferred to: PACU    Handoff Report: Identifed the Patient, Identified the Reponsible Provider, Reviewed the pertinent medical history, Discussed the surgical course, Reviewed Intra-OP anesthesia mangement and issues during anesthesia, Set expectations for post-procedure period and Allowed opportunity for questions and acknowledgement of understanding      Vitals:  Vitals Value Taken Time   /90    Temp 36.3    Pulse 74 05/15/22 1006   Resp 5 05/15/22 1006   SpO2 99 % 05/15/22 1006   Vitals shown include unvalidated device data.    Electronically Signed By: Jose Juan Mckeon MD  May 15, 2022  10:07 AM

## 2022-05-15 NOTE — ANESTHESIA POSTPROCEDURE EVALUATION
Patient: Delilah Fountain    Procedure: Procedure(s):  APPENDECTOMY, LAPAROSCOPIC with lysis of Adesions       Anesthesia Type:  General    Note:  Disposition: Inpatient; Admission   Postop Pain Control: Uneventful            Sign Out: Well controlled pain   PONV: No   Neuro/Psych: Uneventful            Sign Out: Acceptable/Baseline neuro status   Airway/Respiratory: Uneventful            Sign Out: Acceptable/Baseline resp. status   CV/Hemodynamics: Uneventful            Sign Out: Acceptable CV status; No obvious hypovolemia; No obvious fluid overload   Other NRE: NONE   DID A NON-ROUTINE EVENT OCCUR? No           Last vitals:  Vitals Value Taken Time   /91 05/15/22 1045   Temp 36.6  C (97.8  F) 05/15/22 1045   Pulse 79 05/15/22 1049   Resp 8 05/15/22 1049   SpO2 92 % 05/15/22 1053   Vitals shown include unvalidated device data.    Electronically Signed By: Amor Asher MD  May 15, 2022  11:39 AM

## 2022-05-15 NOTE — OP NOTE
[facility]    Operative Note      Pre-operative diagnosis: Acute appendicitis, unspecified acute appendicitis type [K35.80]   Post-operative diagnosis same   Procedure: Procedure(s):  APPENDECTOMY, LAPAROSCOPIC   Surgeon: Surgeon(s) and Role:     * Alban Lea MD - Primary     * Syd Borden MD - Resident - Assisting   Anesthesia: General    Estimated blood loss: 5ml   Drains: None   Specimens: ID Type Source Tests Collected by Time Destination   1 : appendix Tissue Appendix SURGICAL PATHOLOGY EXAM Alban Lea MD 5/15/2022  9:28 AM       Findings: suppurative appendicitis without abscess. numerous adhesions within the abdomen requiring 45 minutes of lysis.   The appendix was inflamed.  It was not perforated.  There was no fluid near the appendix.   Complications: None.   Implants: None.       Modifier 22 should be applied-increased difficult due to morbid obesity and numerous adhesions from prior surgery- an additional 45 minutes of lysis of adhesions was required for this operation.    OPERATIVE INDICATIONS:  Delilah Fountain is a 56 year old-year-old female with a history of right lower quadrant abdominal pain, leukocytosis, and imaging confirming appendicitis.  Given her history of pancreas transplant (in the RLQ), and very large LLQ incisional hernia we had trialed ABX for this.  She was not progressing- having emesis.  After understanding the risks and benefits of proceeding with surgery, the patient has an indication for laparoscopic appendectomy and consented to undergo surgery.    OPERATIVE DETAILS:  The patient was brought to the operating room, intubated and prepared in a routine fashion.  A timeout was performed prior to surgery and documented by the nursing team.    Under the benefits of general anesthesia, a left upper quadrant Veress needle was inserted and pneumoperitoneum was established using carbon dioxide gas to a  maximum pressure of 15 mmHg.  Optiview was used to enter the abdomen.  We encountered numerous soft adhesions to the anterior abdominal wall.  These were bluntly taken down with the laparoscope until space in the RUQ was cleared for an additional 5mm port.  Then ligasure energy device was used to take down adhesions leading to the RLQ.  A 12mm port was placed to the right of midline.  A total of 3 ports were placed and the 5 mm laparoscope was utilized. The cecum and appendix were identified at the confluence of the tenia coli. The appendix was inflamed and not perforated. The mesoappendix was controlled and divided with ligasure energy device.  A blue 45mm load endo-KATHARINA stapler was then used to divide the appendix at its base. The appendix was placed into an endocatch bag and removed from the LLQ port site. Hemostasis was confirmed. The appendiceal stump was hemostatic with staples intact. We examined our entry port site- no injury seen at the site.  The 12mm incision fascia was closed using a single 0 vicryl suture. All skin incisions were closed using 4-0 monocryl suture and skin glue was applied.    All needle and sponge counts were correct x2 at the end of the procedure.    I was present throughout the procedure.

## 2022-05-15 NOTE — PLAN OF CARE
Status: Admitted 5/13 for appendicitis. Add-on for appendectomy.   Vitals: VSS on RA. CPAP overnight   Neuros: Intact. Denies N/T   IV: PIV infusing NS at 125 mL/hr. Heparin drip running at 1350 mL/hour - stopped this am at 0500 for surgery per MD order.   Diet: NPO since midnight   Bowel status: BM 5/14  : Voiding spontaneously    Skin: Intact   Pain: Mild RLQ pain with activity, no interventions needed per patient.   Activity: up ad dayron in room   Plan: Add on OR for appendectomy. Continue to follow POC.

## 2022-05-16 VITALS
OXYGEN SATURATION: 92 % | SYSTOLIC BLOOD PRESSURE: 123 MMHG | DIASTOLIC BLOOD PRESSURE: 66 MMHG | TEMPERATURE: 98.5 F | BODY MASS INDEX: 47.29 KG/M2 | HEART RATE: 94 BPM | RESPIRATION RATE: 16 BRPM | HEIGHT: 68 IN

## 2022-05-16 LAB
ALBUMIN SERPL-MCNC: 2.5 G/DL (ref 3.4–5)
ALP SERPL-CCNC: 59 U/L (ref 40–150)
ALT SERPL W P-5'-P-CCNC: 15 U/L (ref 0–50)
ANION GAP SERPL CALCULATED.3IONS-SCNC: 7 MMOL/L (ref 3–14)
AST SERPL W P-5'-P-CCNC: 15 U/L (ref 0–45)
BILIRUB SERPL-MCNC: 0.5 MG/DL (ref 0.2–1.3)
BUN SERPL-MCNC: 10 MG/DL (ref 7–30)
CALCIUM SERPL-MCNC: 8.4 MG/DL (ref 8.5–10.1)
CHLORIDE BLD-SCNC: 114 MMOL/L (ref 94–109)
CO2 SERPL-SCNC: 24 MMOL/L (ref 20–32)
CREAT SERPL-MCNC: 0.74 MG/DL (ref 0.52–1.04)
ERYTHROCYTE [DISTWIDTH] IN BLOOD BY AUTOMATED COUNT: 14 % (ref 10–15)
GFR SERPL CREATININE-BSD FRML MDRD: >90 ML/MIN/1.73M2
GLUCOSE BLD-MCNC: 160 MG/DL (ref 70–99)
HCT VFR BLD AUTO: 40.1 % (ref 35–47)
HGB BLD-MCNC: 13 G/DL (ref 11.7–15.7)
MCH RBC QN AUTO: 31.9 PG (ref 26.5–33)
MCHC RBC AUTO-ENTMCNC: 32.4 G/DL (ref 31.5–36.5)
MCV RBC AUTO: 98 FL (ref 78–100)
PLATELET # BLD AUTO: 247 10E3/UL (ref 150–450)
POTASSIUM BLD-SCNC: 3.3 MMOL/L (ref 3.4–5.3)
PROT SERPL-MCNC: 6.8 G/DL (ref 6.8–8.8)
RBC # BLD AUTO: 4.08 10E6/UL (ref 3.8–5.2)
SODIUM SERPL-SCNC: 145 MMOL/L (ref 133–144)
WBC # BLD AUTO: 9.4 10E3/UL (ref 4–11)

## 2022-05-16 PROCEDURE — 250N000013 HC RX MED GY IP 250 OP 250 PS 637: Performed by: STUDENT IN AN ORGANIZED HEALTH CARE EDUCATION/TRAINING PROGRAM

## 2022-05-16 PROCEDURE — 99233 SBSQ HOSP IP/OBS HIGH 50: CPT | Mod: 24 | Performed by: INTERNAL MEDICINE

## 2022-05-16 PROCEDURE — 250N000011 HC RX IP 250 OP 636: Performed by: STUDENT IN AN ORGANIZED HEALTH CARE EDUCATION/TRAINING PROGRAM

## 2022-05-16 PROCEDURE — 85018 HEMOGLOBIN: CPT | Performed by: STUDENT IN AN ORGANIZED HEALTH CARE EDUCATION/TRAINING PROGRAM

## 2022-05-16 PROCEDURE — 80053 COMPREHEN METABOLIC PANEL: CPT | Performed by: STUDENT IN AN ORGANIZED HEALTH CARE EDUCATION/TRAINING PROGRAM

## 2022-05-16 PROCEDURE — 99239 HOSP IP/OBS DSCHRG MGMT >30: CPT | Performed by: STUDENT IN AN ORGANIZED HEALTH CARE EDUCATION/TRAINING PROGRAM

## 2022-05-16 PROCEDURE — 36415 COLL VENOUS BLD VENIPUNCTURE: CPT | Performed by: STUDENT IN AN ORGANIZED HEALTH CARE EDUCATION/TRAINING PROGRAM

## 2022-05-16 PROCEDURE — 250N000012 HC RX MED GY IP 250 OP 636 PS 637: Performed by: STUDENT IN AN ORGANIZED HEALTH CARE EDUCATION/TRAINING PROGRAM

## 2022-05-16 RX ORDER — SODIUM BICARBONATE 650 MG/1
650 TABLET ORAL 2 TIMES DAILY
Qty: 360 TABLET | Refills: 3 | COMMUNITY
Start: 2022-05-16 | End: 2022-05-16

## 2022-05-16 RX ORDER — POTASSIUM CHLORIDE 1.5 G/1.58G
20 POWDER, FOR SOLUTION ORAL 2 TIMES DAILY
Qty: 4 PACKET | Refills: 0 | Status: SHIPPED | OUTPATIENT
Start: 2022-05-16 | End: 2022-06-27

## 2022-05-16 RX ADMIN — ATORVASTATIN CALCIUM 10 MG: 10 TABLET, FILM COATED ORAL at 07:44

## 2022-05-16 RX ADMIN — VENLAFAXINE HYDROCHLORIDE 150 MG: 150 CAPSULE, EXTENDED RELEASE ORAL at 07:44

## 2022-05-16 RX ADMIN — SULFAMETHOXAZOLE AND TRIMETHOPRIM 1 TABLET: 400; 80 TABLET ORAL at 07:46

## 2022-05-16 RX ADMIN — PIPERACILLIN AND TAZOBACTAM 4.5 G: 4; .5 INJECTION, POWDER, FOR SOLUTION INTRAVENOUS at 02:32

## 2022-05-16 RX ADMIN — PIPERACILLIN AND TAZOBACTAM 4.5 G: 4; .5 INJECTION, POWDER, FOR SOLUTION INTRAVENOUS at 07:44

## 2022-05-16 RX ADMIN — AZATHIOPRINE 150 MG: 50 TABLET ORAL at 10:15

## 2022-05-16 RX ADMIN — TACROLIMUS 2 MG: 1 CAPSULE, GELATIN COATED ORAL at 07:47

## 2022-05-16 ASSESSMENT — ACTIVITIES OF DAILY LIVING (ADL)
ADLS_ACUITY_SCORE: 22

## 2022-05-16 NOTE — PROGRESS NOTES
Surgery Progress Note  05/16/2022       Subjective:  No acute overnight events. Doing well since surgery yesterday. Tolerating diet. Pain controlled on oral medications. Passing gas and has had BM. Uses home CPAP overnight.      Objective:  Temp:  [97.7  F (36.5  C)-98.5  F (36.9  C)] 98.5  F (36.9  C)  Pulse:  [75-96] 94  Resp:  [12-16] 16  BP: (123-183)/(66-97) 123/66  SpO2:  [89 %-99 %] 92 %    I/O last 3 completed shifts:  In: 1916 [I.V.:1816; IV Piggyback:100]  Out: 5 [Blood:5]      Gen: Awake, alert, NAD  Resp: NLB on RA  Abdomen: soft, large left sided ventral hernia that is soft but difficult to define due to body habitus, appropriate megan incisional tenderness, no rebound tenderness or guarding, no peritoneal signs, midline surgical scar well healed.   Ext: WWP, no edema     Labs:  Recent Labs   Lab 05/15/22  0658 05/14/22  1543 05/14/22  0725   WBC 7.6 10.1 11.0   HGB 13.5 13.9 14.3    266 257       Recent Labs   Lab 05/15/22  0658 05/14/22  0725 05/13/22  1302    143 142   POTASSIUM 3.5 3.6 3.7   CHLORIDE 113* 112* 110*   CO2 24 24 22   BUN 8 10 8   CR 0.71 0.62 0.65   * 126* 160*   RENATO 8.5 9.0 9.1       Imaging:  Reviewed     Assessment/Plan:   56 year old female with h/o T1DM and ESRD s/p SPKT 2000, LLQ incisional hernia s/p repair 2013 with biologic mesh with Dr. Pandey of transplant surgery, GERD, HERLINDA on CPAP, bilateral PE despite IVC filter placement currently on Xarelto who was admitted for acute appendicitis. Now s/p laparoscopic appendectomy on 5/15.     - Recommend stopping post-operative antibiotics today  - Okay to restart DOAC on POD2 (tomorrow), hold therapeutic level anticoagulation until then  - No narcotics on discharge  - From surgical standpoint, okay to discharge today (surgical discharge instructions placed in discharge navigator)  - Will have telephone follow up      Seen, examined, and discussed with chief resident, who will discuss with staff.  - - - - - - -  - - - - - - - - - - -  Gilberto Ivory MD  Surgery Resident

## 2022-05-16 NOTE — PROGRESS NOTES
[facility]  Transplant Nephrology progress note   Date of Admission:  5/13/2022  Today's Date: 05/16/2022  Requesting physician: Brandon Weems MD        Recommendations:  -Continue current immunosuppression; currently tolerating them well    if she is unable to keep pills down, let us know and we can come up with alternative regimens via sublingual/IV formulations         Assessment & Plan      Delilah Fountain is a 56 year old female with a history of SPK, hernia repair (performed by Dr. Pandey) recurrent DVT/PE on anticoagulation + IVC filter, BMI 47 admitted with acute appendicitis. She is being medically managed due to complex surgical history with SPKs, hernias, obesity. She has been able to receive and keep down PO immunosuppression.     #Acute appendicitis    -Appreciate multidisciplinary management from Medicine/General Surgery/Transplant Surgery    -Agree with antibiotics; pip-tazo or cefepime/flagyl probably sufficient     # DDKT (SPK): Stable              - Baseline Cr ~ 0.6- 0.7 mg/dL               - Proteinuria: Normal (<0.2 grams)              - Date DSA Last Checked: 2017      Latest DSA: No              - BK Viremia: No              - Kidney Tx Biopsy: No     # Pancreas Tx (SPK):               - Pancreatic Exocrine Drainage: Enteric drained                     - Blood glucose: Near euglycemia         But occasionally elevated fasting sugar.   On insulin: No              - HbA1c: Stable      Latest HbA1c: 6 %              - Pancreatic enzymes: Stable              - Date DSA Last Checked: 2017                    Latest DSA: No              - Pancreas Tx Biopsy: No     # Immunosuppression: Tacrolimus immediate release (goal 5-8) and Azathioprine (dose 150 mg daily)              - Changes: No     # Infection Prophylaxis:   - PJP: Sulfa/TMP (Bactrim)     # Hypertension: Controlled;   Goal BP: < 130/80 weight has been in the range  284 - 300 lbs. Now  it is 311 lbs              - Changes: No     # Prediabetes : Glucose generally running ~ low  135 - 139  . A1C 6 %               - Management as per primary team.     # DVT recurrent with history of PE: on Rivaroxaban,    -On hold due to acute appendicitis    -Has  IVC filter (permanent)   -SCDs reasonable      # Transplant History:  Etiology of Kidney Failure: Diabetes   Tx: SPK  Transplant: 7/12/2000 (Kidney / Pancreas)  Significant changes in immunosuppression:  On azathioprine  Significant transplant-related complications: None     Patient seen and staffed with Dr Chung  Recommendations were communicated to the primary team via this note.    Physician Attestation   I, Armando Chung MD, personally examined and evaluated this patient.  I discussed the patient with the resident/fellow and care team, and agree with the assessment and plan of care as documented in the note on 05/16/22 .      I personally reviewed vital signs, medications, labs, and imaging.    Armando Chung MD  Date of Service (when I saw the patient): 05/16/22            REASON FOR CONSULT   Kidney, pancreas transplant; immunosuppression management     History of Present Illness   Delilah Fountain is a 56 year old female with a history of SPK, hernia repair (performed by Dr. Pandey) recurrent DVT/PE on anticoagulation + IVC filter, BMI 47 admitted with acute appendicitis. She is being medically managed due to complex surgical history with SPKs, hernias, obesity.         Interval history    UOP not monitored  VS  No Labs yet  She has been able to receive and keep down PO immunosuppression.   No abd pain/n/v/d  No CP/SOB    Review of Systems    The 10 point Review of Systems is negative other than noted in the HPI or here.     Past Medical History    I have reviewed this patient's medical history and updated it with pertinent information if needed.   Past Medical History:   Diagnosis Date    Allergic rhinitis     Anxiety      Gastro-oesophageal reflux disease     Sleep apnea     uses cpap machine       Past Surgical History   I have reviewed this patient's surgical history and updated it with pertinent information if needed.  Past Surgical History:   Procedure Laterality Date    EYE SURGERY      left and right cataract surgeries    HERNIA REPAIR      ventral    HERNIORRHAPHY INCISIONAL (LOCATION)  3/21/2013    Procedure: HERNIORRHAPHY INCISIONAL (LOCATION);  Open Incisional Hernia  Anesthesia General with block;  Surgeon: Rhonda Pandey MD;  Location: [facility] OR    TRANSPLANT      kidney/pancreas       Family History   I have reviewed this patient's family history and updated it with pertinent information if needed.   No family history of cancer, heart disease       Social History   I have reviewed this patient's social history and updated it with pertinent information if needed. Delilah Fountain  reports that she has never smoked. She has never used smokeless tobacco. She reports current alcohol use. She reports that she does not use drugs.    Allergies   Allergies   Allergen Reactions    Wellbutrin [Bupropion] Other (See Comments)     Pt. Reports that it made her more weird     Prior to Admission Medications    acetaminophen  975 mg Oral Q8H    atorvastatin  10 mg Oral Daily    azaTHIOprine  150 mg Oral Daily    enoxaparin ANTICOAGULANT  40 mg Subcutaneous Q24H    piperacillin-tazobactam  4.5 g Intravenous Q6H    polyethylene glycol  17 g Oral Daily    senna-docusate  1 tablet Oral BID    sodium chloride (PF)  3 mL Intracatheter Q8H    sodium chloride (PF)  3 mL Intracatheter Q8H    sulfamethoxazole-trimethoprim  1 tablet Oral Q Mon Wed Fri AM    tacrolimus  2 mg Oral BID    venlafaxine  150 mg Oral BID         Physical Exam   Temp  Av.8  F (36.6  C)  Min: 96.1  F (35.6  C)  Max: 98.8  F (37.1  C)      Pulse  Av.7  Min: 76  Max: 111 Resp  Av.6  Min: 16  Max: 18  SpO2  Av.3 %  Min: 92 %  Max: 100 %     BP  "123/66 (BP Location: Right arm)   Pulse 94   Temp 98.5  F (36.9  C) (Oral)   Resp 16   Ht 1.727 m (5' 8\")   LMP 11/06/2015   SpO2 92%   BMI 47.29 kg/m      Admit       GENERAL APPEARANCE: alert and no distress  HENT: mouth without ulcers or lesions  LYMPHATICS: no cervical or supraclavicular nodes  RESP: lungs clear to auscultation - no rales, rhonchi or wheezes  CV: regular rhythm, normal rate, no rub, no murmur  EDEMA: no LE edema bilaterally  ABDOMEN: Obese soft, mild tenderness in RLQ, non distended, soft and reducible left sided ventral hernia  MS: extremities normal - no gross deformities noted, no evidence of inflammation in joints, no muscle tenderness  SKIN: no rash    Data   CMP  Recent Labs   Lab 05/15/22  0658 05/14/22  0725 05/13/22  1302    143 142   POTASSIUM 3.5 3.6 3.7   CHLORIDE 113* 112* 110*   CO2 24 24 22   ANIONGAP 7 7 10   * 126* 160*   BUN 8 10 8   CR 0.71 0.62 0.65   GFRESTIMATED >90 >90 >90   RENATO 8.5 9.0 9.1   PROTTOTAL 7.1  --  7.9   ALBUMIN 2.7*  --  3.1*   BILITOTAL 0.4  --  0.4   ALKPHOS 62  --  77   AST 10  --  12   ALT 14  --  16     CBC  Recent Labs   Lab 05/15/22  0658 05/14/22  1543 05/14/22  0725 05/13/22  1302   HGB 13.5 13.9 14.3 14.6   WBC 7.6 10.1 11.0 13.7*   RBC 4.22 4.39 4.55 4.70   HCT 41.0 43.0 44.5 44.8   MCV 97 98 98 95   MCH 32.0 31.7 31.4 31.1   MCHC 32.9 32.3 32.1 32.6   RDW 14.0 14.0 14.1 13.7    266 257 288     INR  Recent Labs   Lab 05/15/22  0658 05/14/22  2059 05/14/22  0725   INR  --   --  1.22*   PTT 31 42*  --      ABGNo lab results found in last 7 days.   Urine Studies  Recent Labs   Lab Test 05/13/22  1352 02/28/20  2347 11/20/15  1449   COLOR Yellow Yellow Yellow   APPEARANCE Clear Clear Clear   URINEGLC Negative Negative Negative   URINEBILI Negative Negative Negative   URINEKETONE Negative Negative 10*   SG 1.015 1.016 1.010   UBLD Small* Small* Small*   URINEPH 7.0 6.5 6.5   PROTEIN 200 * Negative 30*   NITRITE Negative " Negative Negative   LEUKEST Negative Negative Negative   RBCU 7* 5* 6*   WBCU 3 1 1     Recent Labs   Lab Test 06/30/16  1021 03/31/15  0843 11/26/14  0903   UTPG 0.06 Unable to calculate due to low value Unable to calculate due to low value     PTH  Recent Labs   Lab Test 08/25/17  1107   PTHI 62     Iron Studies  No lab results found.    IMAGING:  All imaging studies reviewed by me.

## 2022-05-16 NOTE — PLAN OF CARE
Status: s/p appendectomy 5/15.   Vitals: VSS on RA. CPAP overnight   Neuros: Intact. Denies N/T   IV: PIV infusing NS at 125 mL/hr  Diet: Regular diet, tolerating well   Bowel status: BM 5/14. Passing gas   : Voiding spontaneously    Skin: x3 lap sites to abd w/ liquid bandage  Pain: Mild RLQ pain with activity, no interventions needed per patient.   Activity: up ad dayron in room   Plan: Continue to follow POC. Possible discharge today. Friend Javier will provide transport upon discharge

## 2022-05-16 NOTE — DISCHARGE SUMMARY
[facility]  Hospitalist Discharge Summary      Date of Admission:  5/13/2022  Date of Discharge:  5/16/2022  2:15 PM  Discharging Provider: Brandon Weems MD  Discharge Service: Hospitalist Service, GOLD TEAM 9    Discharge Diagnoses   #appendicitis  #RLQ pain, stable  #nausea  #leukocytosis, improving  #tachypnea  #sepsis  #DDKT  #pancreas transplant  #H/O of DVT with recurrent PE  #Mild hypernatremia  #Hypoalbuminemia  #Mild hypokalemia    Follow-ups Needed After Discharge   Follow-up Appointments     Adult [facility] Follow-up and recommended labs and tests      Follow up with primary care provider, Alban Clark, within 7 days for   hospital follow- up.  The following labs/tests are recommended: BMP.      Please follow-up on appendicitis/abdominal pain, kidney/pancreas   transplant, restarting of apixaban (instructed patient to restart on   5/17), hypernatremia (holding sodium bicarb tabs, discussed increasing PO   water intake, consideration for restart as outpatient, pending outpatient   BMP), hypokalemia, hypoalbuminemia.    Appointments on [place] and/or [facility] (with [facility] or [facility]   provider or service). Call [phone] if you haven't heard regarding   these appointments within 7 days of discharge.            Please also follow up on her albumin/protein albumin in urine.    Unresulted Labs Ordered in the Past 30 Days of this Admission     Date and Time Order Name Status Description    5/15/2022  9:32 AM Surgical Pathology Exam In process       These results will be followed up by PCP    Discharge Disposition   Discharged to home  Condition at discharge: Stable      Hospital Course    Delilah Fountain is a 56 year old female admitted on 5/13/2022. She has a history of DVT and PE (on rivaroxaban), HERLINDA on CPAP, T1DM, combined pancreas/kdiney transplant as a result of T1DM and is admitted for appendicitis     #appendicitis  #RLQ pain,  stable  #nausea  #leukocytosis, improving  #tachypnea  #sepsis  Patient here with RLQ pain for 1 day, was initially hypertensive on arrival though this resolved and other vitals were unremarkable. Labs notable for leukocytosis with neutrophilic predominance. Otherwise lipase low, UA with no concern for infection, and electrolytes WNL. CT A/P notable for acute uncomplicated appendicitis. Surgery evaluated patient and did not pursue surgical intervention night of admission with patient being fully anticoagulated. Patient received ertapenem in ED, changed to Zosyn. On exam RLQ tenderness appreciated but otherwise clinically doing well, no acute abdomen. Underwent appendectomy (inflamed and not perforated) and adhesiolysis 5/15/2022. EBL 5cc.  - Surgery consulted   -Abx stopped POD1   -No narcotics on DC   -Surgical instructions placed in DC navigator   -Patient will have telephone follow up  - Instructed patient to restart her apixaban POD2 (5/17/22)  - Patient tolerated diet, passing flatus, and had pain controlled prior to DC in stable condiditon. She would like to follow electrolyte abnormalities as outpatient and DC 5/16/2022. Patient instructed to follow up at PCP clinic, regarding upcoming lab draws, and medication changes. Patient understood instructions and all questions answered.     #DDKT  #pancreas transplant  Patient with history of pancreas and kidney transplant secondary to T1DM. Doing well with no issues for quite some time. No laboratory evidence of any issues.   -Continue immunosuppression and antiinfective prophylaxis  -Routine follow up with Transplant Nephrology as scheduled (Sept 2022)     #H/O of DVT with recurrent PE  Discussed with patient and reports she was on Eliquis prior to surgery, confirmed with pharmacy. Eliquis held on admission, prior to surgery. Noted permanent IVC filter in place. Instructed patient to restart her apixaban POD2 (5/17/22).    #Mild hypernatremia  Likely in the setting  of poor PO water intake. Will repeat labs as outpatient. Discussed increasing her PO intake and held sodium bicarb tabs until follow up (did not take in hospital and bicarb normal at 24).    #Hypoalbuminemia  Will need outpatient follow up (UA with protein albumin 200). No urinary symptoms or change in urine. Decreased diet prior to DC (improved day of DC). Would recommend repeat UA and further follow up as needed.    #Mild hypokalemia  Likely in the setting of decreased PO intake. Prescribed 2 days of K replacement and instructed to increase K intake through diet. Will have outpatient labs follow up.    Consultations This Hospital Stay   NURSING TO CONSULT FOR VASCULAR ACCESS CARE IP CONSULT  NEPHROLOGY KIDNEY/PANCREAS TRANSPLANT ADULT IP CONSULT  NURSING TO CONSULT FOR VASCULAR ACCESS CARE IP CONSULT  NURSING TO CONSULT FOR VASCULAR ACCESS CARE IP CONSULT  PHARMACY IP CONSULT  PHARMACY IP CONSULT  NURSING TO CONSULT FOR VASCULAR ACCESS CARE IP CONSULT  PHARMACY IP CONSULT  PHARMACY IP CONSULT    Code Status   Prior    Time Spent on this Encounter   I, Brandon Weems MD, personally saw the patient today and spent greater than 30 minutes discharging this patient.       Brandon Weems MD  [facility] [address] [address]  Phone: [phone]  ______________________________________________________________________    Physical Exam   Vital Signs:                    Weight: 0 lbs 0 oz  General: alert, pleasant, NAD  Cardiac: RRR, no clicks rubs or murmurs  Respiratory: good respiratory effort, CTAB, no wheezes/rhonchi/rales or crackles appreciated,  GI: no tenderness to palpation, no rebound tenderness or guarding, BS normoactive, non distended. port sites on abdomen (glue in place) without signs of infection  Extremities: moving all extremities equally, no peripheral edema, brisk pulses, warm and well perfused  Neuro: alert and oriented       Primary Care Physician   Alban  HEATHER Clark    Discharge Orders      Basic metabolic panel    Please send results to Dr. Alban Clrak  Phone [phone] and fax [phone]     Tacrolimus by [employer] Mass [employer]rometry     Brief Discharge Instructions    Discharge Instructions  Activity  - No strenuous exercise for 4 weeks  - No lifting, pushing, pulling more than 15-20 pounds for 4 weeks  - Do not operate a motor vehicle while taking narcotic pain medications    Incisions  - You may shower and get incisions wet starting 24 hrs after surgery  - Do not scrub incisions or submerge wounds (e.g. bath, pool, hot tub, lake, etc.) for 4 weeks   - Your incision is closed with dissolvable sutures and topped with a surgical glue called Dermabond. This will fall off in ~2 weeks on its own.  - Avoid applying any lotions or ointments near the areas where the Dermabond glue was used    Medications  - Do not take any additional Tylenol (acetaminophen) while using a narcotic pain medication which includes acetaminophen  - Do not take more than 4,000mg of acetaminophen in any 24 hour period, as this can cause liver damage  - Take stool softeners such as Senna or Miralax while you are using narcotics, but stop if you develop diarrhea    Follow-Up:  - Call your primary care provider to touch base regarding your recent admission.    - Call or return sooner than your regularly scheduled visit if you develop any of the following: fever >101.5, uncontrolled pain, uncontrolled nausea or vomiting, as well as increased redness, swelling, or drainage from your wound.   -  A nurse from the General Surgery Clinic will contact you 24 hours, or next business day, after your discharge from the hospital.  If you have questions or want to schedule an in person follow up appointment please call the General Surgery Clinic at [phone].  Call [phone] and ask to speak with the Surgery resident on call if you are having troubles in the evenings, at night, or on the weekend.  -   If you are receiving home care please inform your home care nurse of our contact number.     Reason for your hospital stay    You are hospitalized for appendicitis and received an appendectomy.     Activity    Your activity upon discharge: activity as tolerated     Adult [facility] Follow-up and recommended labs and tests    Follow up with primary care provider, Alban Clark, within 7 days for hospital follow- up.  The following labs/tests are recommended: BMP.      Please follow-up on appendicitis/abdominal pain, kidney/pancreas transplant, restarting of apixaban (instructed patient to restart on 5/17), hypernatremia (holding sodium bicarb tabs, discussed increasing PO water intake, consideration for restart as outpatient, pending outpatient BMP), hypokalemia, hypoalbuminemia.    Appointments on [place] and/or [facility] (with [facility] or [facility] provider or service). Call [phone] if you haven't heard regarding these appointments within 7 days of discharge.     Diet    Follow this diet upon discharge: Orders Placed This Encounter      Advance Diet as Tolerated: Regular Diet Adult; Regular Diet Adult     CBC with Platelets & Differential       Significant Results and Procedures   Most Recent 3 CBC's:Recent Labs   Lab Test 05/16/22  1039 05/15/22  0658 05/14/22  1543   WBC 9.4 7.6 10.1   HGB 13.0 13.5 13.9   MCV 98 97 98    237 266     Most Recent 3 BMP's:Recent Labs   Lab Test 05/16/22  1039 05/15/22  0658 05/14/22  0725   * 144 143   POTASSIUM 3.3* 3.5 3.6   CHLORIDE 114* 113* 112*   CO2 24 24 24   BUN 10 8 10   CR 0.74 0.71 0.62   ANIONGAP 7 7 7   RENATO 8.4* 8.5 9.0   * 117* 126*   ,   Results for orders placed or performed during the hospital encounter of 05/13/22   CT Abdomen Pelvis w Contrast    Addendum: 5/13/2022    Addendum: Dictation error in impression point 3.    Corrected   impression:    3. Colon containing ventral hernia above the umbilicus without  evidence of  obstruction.    I have personally reviewed the examination and initial interpretation  and I agree with the findings.    CONNIE JIMENEZ MD         SYSTEM ID:  [id number]      Narrative    EXAMINATION: CT ABDOMEN PELVIS W CONTRAST, 5/13/2022 3:21 PM    TECHNIQUE:  Helical CT of the abdomen and pelvis with IV contrast.  Coronal and sagittal reformatted images were created, archived and  reviewed at the workstation for further assessment.    COMPARISON: CT 11/20/2015    HISTORY: RLQ pain, concern for appendicitis; hx kidney/panc transplant    FINDINGS:     The appendix demonstrates increased enhancement, wall thickening, and  periappendiceal stranding (series 4 images 41-53, series 6 images  312-336). Caliber approximately 9 mm. No evidence of perforation or  abscess.    Diffuse hepatic steatosis. Unremarkable gallbladder and biliary tree.  Normal spleen. Mild fatty atrophy of the pancreas. No adrenal nodules.  Atrophic native kidneys.     Unremarkable appearance of the left lower quadrant transplant kidney.  Atrophic appearance of the right lower quadrant adjacent transplant  pancreas.    Colon containing ventral hernia above the umbilicus without evidence  of obstruction. Left spigelian hernia containing small and large bowel  and omental fat without evidence of obstruction.    No or intraperitoneal free air, ascites, or organized fluid  collections.    IVC filter in the infrarenal IVC.    Unremarkable uterus and adnexa. Normal-appearing bladder.    No acute or suspicious osseous abnormality.    The lung bases are clear.      Impression    IMPRESSION:    1. Acute uncomplicated appendicitis.  2. Unremarkable appearance of the left lower quadrant transplant  kidney and atrophic appearance of the right lower quadrant transplant  pancreas.  3. Colon containing ventral hernia above the umbilicus without  evidence of dissection.  4. Large left spigelian hernia containing small and large bowel  without evidence of  obstruction.  5. Hepatic steatosis.  6. IVC filter.    I have personally reviewed the examination and initial interpretation  and I agree with the findings.    CONNIE JIMENEZ MD         SYSTEM ID:  [id number]       Discharge Medications   Discharge Medication List as of 5/16/2022 12:45 PM      START taking these medications    Details   apixaban ANTICOAGULANT (ELIQUIS) 5 MG tablet Take 1 tablet (5 mg) by mouth 2 times daily . Restart medication 5/17/22., Disp-60 tablet, R-0, E-Prescribe      potassium chloride (KLOR-CON) 20 MEQ packet Take 20 mEq by mouth 2 times daily, Disp-4 packet, R-0, E-Prescribe         CONTINUE these medications which have NOT CHANGED    Details   venlafaxine (EFFEXOR XR) 75 MG 24 hr capsule Take 150 mg by mouth 2 times daily, Historical      atorvastatin (LIPITOR) 10 MG tablet Take 1 tablet (10 mg) by mouth daily, Disp-90 tablet, R-3, E-Prescribe      azaTHIOprine (IMURAN) 50 MG tablet Take 3 tablets (150 mg) by mouth daily, Disp-90 tablet, R-11, E-Prescribe      calcium carbonate (OS-RENATO 500 MG [place]. [place]) 500 MG tablet Take 500 mg by mouth 2 times daily, Historical      Cetirizine HCl (ZYRTEC ALLERGY PO) Take 5 mg by mouth 2 times daily, Historical      cholecalciferol (VITAMIN D3) 1000 units (25 mcg) capsule Take 1 capsule by mouth daily., Disp-30 capsule, R-11, E-Prescribe      order for DME [employer] 10  10-15CM/H20  PILLOWS CHRISTIANSON FX FOR HERHistorical      PROGRAF (BRAND) 1 MG capsule Take 2 capsules (2 mg) by mouth 2 times daily, Disp-120 capsule, R-11, MINAL, E-Prescribe      sulfamethoxazole-trimethoprim (BACTRIM/SEPTRA) 400-80 MG per tablet Take 1 tablet by mouth Every Mon, Wed, Fri Morning, Disp-40 tablet, R-0, E-PrescribeLabs needed         STOP taking these medications       rivaroxaban ANTICOAGULANT (XARELTO ANTICOAGULANT) 20 MG TABS tablet Comments:   Reason for Stopping:         sodium bicarbonate 650 MG tablet Comments:   Reason for Stopping:         venlafaxine (EFFEXOR) 75 MG  tablet Comments:   Reason for Stopping:             Allergies   Allergies   Allergen Reactions     Wellbutrin [Bupropion] Other (See Comments)     Pt. Reports that it made her more weird

## 2022-05-16 NOTE — PLAN OF CARE
Status: s/p appendectomy 5/15.   Vitals: VSS on RA. CPAP overnight   Neuros: Intact. Denies N/T   IV: PIV removed without difficulty prior to discharge  Diet: Regular diet, tolerating well   Bowel status: BM 5/14. Passing gas   : Voiding spontaneously    Skin: x3 lap sites to abd w/ liquid bandage. Showered today after ok by MD  Pain: Mild RLQ pain with activity, no interventions needed per patient.   Activity: up ad dayron in room   Plan: Continue to follow POC.      Discharge time/date: today at 1400  Walked or Wheelchair: walked  PIV removed: yes  Reviewed AVS with patient: yes  Medication due times added to AVS in EPIC: yes  Verbalized understanding of discharge with teachback: yes  Medications retrieved from pharmacy: yes, 2  Supplies sent home: n/a  Belongings from security with patient: n/a

## 2022-05-16 NOTE — PROGRESS NOTES
[facility]  Transplant Nephrology progress note   Date of Admission:  5/13/2022  Today's Date: 05/16/2022  Requesting physician: Brandon Weems MD    Recommendations:  -Discharge per Surgery/Medicine teams, we are comfortable with discharge.     -Continue current immunosuppression     -Counseled on diet/weight loss/exercise to help combat insulin resistance.     -Routine follow up with Transplant Nephrology as scheduled (9/2022)       Assessment & Plan      Delilah Fountain is a 56 year old female with a history of SPK, hernia repair (performed by Dr. Pandey) recurrent DVT/PE on anticoagulation + IVC filter, BMI 47 admitted with acute appendicitis. She is being medically managed due to complex surgical history with SPKs, hernias, obesity. She has been able to receive and keep down PO immunosuppression.     #Acute appendicitis    -Appreciate multidisciplinary management from Medicine/General Surgery/Transplant Surgery    -Agree with antibiotics; pip-tazo or cefepime/flagyl probably sufficient     # DDKT (SPK): Stable              - Baseline Cr ~ 0.6- 0.7 mg/dL               - Proteinuria: Normal (<0.2 grams)              - Date DSA Last Checked: 2017      Latest DSA: No              - BK Viremia: No              - Kidney Tx Biopsy: No     # Pancreas Tx (SPK):               - Pancreatic Exocrine Drainage: Enteric drained                     - Blood glucose: Near euglycemia         But occasionally elevated fasting sugar.   On insulin: No              - HbA1c: Stable      Latest HbA1c: 6 %              - Pancreatic enzymes: Stable              - Date DSA Last Checked: 2017                    Latest DSA: No              - Pancreas Tx Biopsy: No     # Immunosuppression: Tacrolimus immediate release (goal 5-8) and Azathioprine (dose 150 mg daily)              - Changes: No     # Infection Prophylaxis:   - PJP: Sulfa/TMP (Bactrim)     # Hypertension: Controlled;    Goal BP: < 130/80 weight has been in the range  284 - 300 lbs. Now it is 311 lbs              - Changes: No     # Prediabetes : Glucose generally running ~ low  135 - 139  . A1C 6 %               - Management as per primary team.     # DVT recurrent with history of PE: on Rivaroxaban,    -On hold due to acute appendicitis    -Has  IVC filter (permanent)   -SCDs reasonable      # Transplant History:  Etiology of Kidney Failure: Diabetes   Tx: SPK  Transplant: 7/12/2000 (Kidney / Pancreas)  Significant changes in immunosuppression: On azathioprine  Significant transplant-related complications: None     Recommendations were communicated to the primary team via this note.      REASON FOR CONSULT   Kidney, pancreas transplant; immunosuppression management     Interval History:   Underwent laparoscopic appendectomy with Surgery yesterday without issue. She is feeling well, eating, having bowel movements. Anticipated discharge today. She has been on her home tacrolimus and azathioprine.       Review of Systems    The 10 point Review of Systems is negative other than noted in the HPI or here.     Past Medical History    I have reviewed this patient's medical history and updated it with pertinent information if needed.   Past Medical History:   Diagnosis Date     Allergic rhinitis      Anxiety      Gastro-oesophageal reflux disease      Sleep apnea     uses cpap machine       Past Surgical History   I have reviewed this patient's surgical history and updated it with pertinent information if needed.  Past Surgical History:   Procedure Laterality Date     EYE SURGERY      left and right cataract surgeries     HERNIA REPAIR      ventral     HERNIORRHAPHY INCISIONAL (LOCATION)  3/21/2013    Procedure: HERNIORRHAPHY INCISIONAL (LOCATION);  Open Incisional Hernia  Anesthesia General with block;  Surgeon: Rhonda Pandey MD;  Location: [facility] OR     LAPAROSCOPIC APPENDECTOMY N/A 5/15/2022    Procedure: APPENDECTOMY, LAPAROSCOPIC with  "lysis of Adesions;  Surgeon: Alban Lea MD;  Location: [facility] OR     TRANSPLANT      kidney/pancreas       Family History   I have reviewed this patient's family history and updated it with pertinent information if needed.   No family history of cancer, heart disease       Social History   I have reviewed this patient's social history and updated it with pertinent information if needed. Delilah Fountain  reports that she has never smoked. She has never used smokeless tobacco. She reports current alcohol use. She reports that she does not use drugs.    Allergies   Allergies   Allergen Reactions     Wellbutrin [Bupropion] Other (See Comments)     Pt. Reports that it made her more weird     Prior to Admission Medications     acetaminophen  975 mg Oral Q8H     atorvastatin  10 mg Oral Daily     azaTHIOprine  150 mg Oral Daily     enoxaparin ANTICOAGULANT  40 mg Subcutaneous Q24H     piperacillin-tazobactam  4.5 g Intravenous Q6H     polyethylene glycol  17 g Oral Daily     senna-docusate  1 tablet Oral BID     sodium chloride (PF)  3 mL Intracatheter Q8H     sodium chloride (PF)  3 mL Intracatheter Q8H     sulfamethoxazole-trimethoprim  1 tablet Oral Q Mon Wed Fri AM     tacrolimus  2 mg Oral BID     venlafaxine  150 mg Oral BID         Physical Exam   Temp  Av.8  F (36.6  C)  Min: 96.1  F (35.6  C)  Max: 98.8  F (37.1  C)      Pulse  Av.7  Min: 76  Max: 111 Resp  Av.6  Min: 16  Max: 18  SpO2  Av.3 %  Min: 92 %  Max: 100 %     /66 (BP Location: Right arm)   Pulse 94   Temp 98.5  F (36.9  C) (Oral)   Resp 16   Ht 1.727 m (5' 8\")   [facility] 2015   SpO2 92%   BMI 47.29 kg/m      Admit       GENERAL APPEARANCE: alert and no distress  HENT: mouth without ulcers or lesions  LYMPHATICS: no cervical or supraclavicular nodes  RESP: lungs clear to auscultation - no rales, rhonchi or wheezes  CV: regular rhythm, normal rate, no rub, no murmur  EDEMA: no LE edema " bilaterally  ABDOMEN: Obese soft, nontender RLQ non distended, soft and reducible left sided ventral hernia  MS: extremities normal - no gross deformities noted, no evidence of inflammation in joints, no muscle tenderness  SKIN: no rash    Data   CMP  Recent Labs   Lab 05/16/22  1039 05/15/22  0658 05/14/22  0725 05/13/22  1302   * 144 143 142   POTASSIUM 3.3* 3.5 3.6 3.7   CHLORIDE 114* 113* 112* 110*   CO2 24 24 24 22   ANIONGAP 7 7 7 10   * 117* 126* 160*   BUN 10 8 10 8   CR 0.74 0.71 0.62 0.65   GFRESTIMATED >90 >90 >90 >90   RENATO 8.4* 8.5 9.0 9.1   PROTTOTAL 6.8 7.1  --  7.9   ALBUMIN 2.5* 2.7*  --  3.1*   BILITOTAL 0.5 0.4  --  0.4   ALKPHOS 59 62  --  77   AST 15 10  --  12   ALT 15 14  --  16     CBC  Recent Labs   Lab 05/16/22  1039 05/15/22  0658 05/14/22  1543 05/14/22  0725   HGB 13.0 13.5 13.9 14.3   WBC 9.4 7.6 10.1 11.0   RBC 4.08 4.22 4.39 4.55   HCT 40.1 41.0 43.0 44.5   MCV 98 97 98 98   MCH 31.9 32.0 31.7 31.4   MCHC 32.4 32.9 32.3 32.1   RDW 14.0 14.0 14.0 14.1    237 266 257     INR  Recent Labs   Lab 05/15/22  0658 05/14/22  2059 05/14/22  0725   INR  --   --  1.22*   PTT 31 42*  --      ABGNo lab results found in last 7 days.   Urine Studies  Recent Labs   Lab Test 05/13/22  1352 02/28/20  2347 11/20/15  1449   COLOR Yellow Yellow Yellow   APPEARANCE Clear Clear Clear   URINEGLC Negative Negative Negative   URINEBILI Negative Negative Negative   URINEKETONE Negative Negative 10*   SG 1.015 1.016 1.010   UBLD Small* Small* Small*   URINEPH 7.0 6.5 6.5   PROTEIN 200 * Negative 30*   NITRITE Negative Negative Negative   LEUKEST Negative Negative Negative   RBCU 7* 5* 6*   WBCU 3 1 1     Recent Labs   Lab Test 06/30/16  1021 03/31/15  0843 11/26/14  0903   UTPG 0.06 Unable to calculate due to low value Unable to calculate due to low value     PTH  Recent Labs   Lab Test 08/25/17  1107   PTHI 62     Iron Studies  No lab results found.    IMAGING:  All imaging studies reviewed by  me.

## 2022-05-17 ENCOUNTER — PATIENT OUTREACH (OUTPATIENT)
Dept: SURGERY | Facility: CLINIC | Age: 57
End: 2022-05-17
Payer: COMMERCIAL

## 2022-05-17 NOTE — PROGRESS NOTES
Message  Received: Yesterday  Tima Redmond MD [place], Alem Aaron, PIYUSH; Armando Chung MD  Discharged from hospital following lap appendicitis today     Recommendations   Labs tomorrow including Tac trough   Routine follow up with Transplant Nephrology as scheduled (9/2022)   Thanks   Tima Redmond MD, FACP, [facility]   Transplant Nephrology Fellow   [facility]   Pager [phone]     OUTCOME:   RNCC reviewed chart. One time BMP, CBC, and Tacrolimus levels have been ordered. Also, Post transplant standing labs are ordered and active for monthly checks, including tacrolimus level; Extended expiration date thru next appt 10/6/22.     Alem Diaz RN, BSN  Solid Organ Transplant, Post Kidney and Pancreas  Transplant Care Coordinator  [phone]

## 2022-05-17 NOTE — LETTER
OUTPATIENT LABORATORY TEST ORDER    Patient Name: Delilah Fountain                                          Transplant Date: 7/12/2000  YOB: 1965                                               Issue Date & Time: 5/18/2022  2:28 PM    [facility] MR: [id number]                                                 Exp. Date (1 year after date issued)    Diagnoses:   Kidney Transplant (ICD-10  Z94.0)      Pancreas Transplant (ICD-10  Z94.83)     Long term use of medications (ICD-10  Z79.899)              Lab results to be available on the same day drawn.   Please release results to patient upon their request    Please fax to the Transplant Center at [phone].    Monthly   Complete Blood Count with Platelets    Basic Metabolic Panel (Sodium, Potassium, Chloride, CO2, Creatinine, Urea Nitrogen, Glucose, Calcium)   Amylase, Lipase   /Tacrolimus/Prograf drug level     Every 6 months:             Complete Lipid Panel fasting (Cholesterol, Triglycerides, HDL, LDL)            Glycosylated Hemoglobin (A1c)              Urine for Protein/Creatinine      If you have any questions, please call The [facility] at [phone] or [phone].        Armando Chung MD   of Medicine  Division of Nephrology and Hypertension   Transplant Nephrology

## 2022-05-17 NOTE — PROGRESS NOTES
Delilah Fountain is a patient of Dr. Alban Lea that underwent laparoscopic appendectomy approximately 2 days ago (5/15).  Attempted to contact patient via telephone for a status update and review post-op  teaching.  LM on VM to call office.  Await return call.      Of note:  Pathology:  Pending  Wound:  Skin Sealant  Follow-up:  Routine with EGS.  Patient to follow with PCP in ~1 week for hospital follow up, labs, and discuss medications.  Restrictions:  - No strenuous exercise for 3-4 weeks  - No lifting, pushing, pulling more than 15-20 pounds for 3-4 weeks  New medications:  Eliquis- to start 5/17. ?? Pain medication  Equipment/Supplies:  None

## 2022-05-17 NOTE — PROGRESS NOTES
RN Post-Op/Post-Discharge Care Coordination Note    Spoke with Patient.    Support  Patient able to care for self independently     Health Status  Nausea/Vomiting: Patient denies nausea/vomiting.  Eating/drinking: Patient is able to eat and drink without any complaints.  Bowel habits: Patient reports having loose stool. Will add Greek yogurt to her diet and contact this office if it does not resolve within 24-48 hours.  Drains (ABBI): N/A  Fevers/chills: Patient denies any fever or chills.  Incisions: Patient denies any signs and symptoms of infection..  Wound closure:  Skin Sealant  Pain: Minimal.  Not taking any analgesics.  New Medications:  Eliquis- started    Activity/Restrictions  No lifting in excess of 15-20 pounds for 3-4 weeks    Equipment  None    Pathology reviewed with patient:  No, not yet available    Forms/Letters  No    All of her questions were answered including reviewing restrictions and wound care.  She will call this office if she has any further questions and/or concerns.      In lieu of a post-op clinic patient that patient would like to be contacted in approximately 7-10 days via telephone.    A copy of this note was routed to the primary surgeon.      Whom and When to Call  Patient acknowledges understanding of how to manage any medication changes and   when to seek medical care.     Patient advised that if after hour medical concerns arise to please call [phone] and choose option 4 to speak to the physician on call.

## 2022-05-18 LAB
BACTERIA BLD CULT: NO GROWTH
BACTERIA BLD CULT: NO GROWTH

## 2022-05-18 NOTE — PROGRESS NOTES
Post Transplant labs, post discharge not yet completed. [facility] message to Delilah asking to complete and new standing lab letter sent to preferred lab on file.     Next appt 10/6/22 scheduled with Dr. Destiny Diaz, RN, BSN  Solid Organ Transplant, Post Kidney and Pancreas  Transplant Care Coordinator  [phone]

## 2022-05-19 LAB
PATH REPORT.COMMENTS IMP SPEC: NORMAL
PATH REPORT.COMMENTS IMP SPEC: NORMAL
PATH REPORT.FINAL DX SPEC: NORMAL
PATH REPORT.GROSS SPEC: NORMAL
PATH REPORT.MICROSCOPIC SPEC OTHER STN: NORMAL
PATH REPORT.RELEVANT HX SPEC: NORMAL
PHOTO IMAGE: NORMAL

## 2022-05-24 ENCOUNTER — PATIENT OUTREACH (OUTPATIENT)
Dept: SURGERY | Facility: CLINIC | Age: 57
End: 2022-05-24
Payer: COMMERCIAL

## 2022-05-24 NOTE — PROGRESS NOTES
Delilah Fountain is a patient of Dr. Alban Lea that recently underwent a surgical procedure.  The patient was contacted via telephone approximately 7-10 days ago for a status update and post-op teaching.  In lieu of a clinic visit, the patient requested to be contacted at a later date by an RN for an assessment.    Attempted to contact patient via telephone for a status update.  LM on VM to call office.    Have loose stools resolved?  Did she see PCP for labs/meds?    Pathology:  Case Report   Surgical Pathology Report                         Case: [id number]                                   Authorizing Provider:  Alban Lea MD   Collected:           05/15/2022 09:32 AM           Ordering Location:     [facility]                 Received:            05/16/2022 08:32 AM           Pathologist:           Sukh Pro DO                                                             Specimen:    Appendix, appendix                                                                         Final Diagnosis   A. APPENDIX, APPENDECTOMY:  - Acute appendicitis with acute serositis   - One benign lymph node

## 2022-05-24 NOTE — PROGRESS NOTES
Delilah Fountain was contacted several days post procedure via telephone for a status update and elected to have a telephone follow -up call approximately 7-10 days after original contact in lieu of a clinic visit with Dr. Alban Lea.  She continues to do well and the skin glue  has not peeled off.  The patients wounds are healed and the area is C/D/I.  She is afebrile, pain free, and jaden po; the patient is slowly resuming her normal activity.   Discussed restrictions including no lifting in excess of 15-20 pounds for 3 weeks.    Pathology was reviewed with the patient: Yes    All of Delilah Fountain question's were answered and  she would like to return to the clinic on a PRN basis.  The patient is aware that  she may schedule a post op appointment at any time.    A copy of this note was routed to the patients surgeon.

## 2022-05-31 LAB
CREATININE (EXTERNAL): 0.7 MG/DL (ref 0.57–1)
GFR ESTIMATED (EXTERNAL): 101 ML/MIN/1.73
GLUCOSE (EXTERNAL): 137 MG/DL (ref 70–99)
POTASSIUM (EXTERNAL): 4.2 MMOL/L (ref 3.5–5.2)

## 2022-06-24 ENCOUNTER — TELEPHONE (OUTPATIENT)
Dept: SURGERY | Facility: CLINIC | Age: 57
End: 2022-06-24

## 2022-06-24 NOTE — TELEPHONE ENCOUNTER
Spoke to patient reminding them to fill out the new patient questionnaire before their appointment.    Elizabeth TELLEZ MA

## 2022-06-27 ENCOUNTER — OFFICE VISIT (OUTPATIENT)
Dept: SURGERY | Facility: CLINIC | Age: 57
End: 2022-06-27
Payer: COMMERCIAL

## 2022-06-27 VITALS
HEART RATE: 90 BPM | HEIGHT: 68 IN | BODY MASS INDEX: 44.41 KG/M2 | DIASTOLIC BLOOD PRESSURE: 84 MMHG | SYSTOLIC BLOOD PRESSURE: 136 MMHG | WEIGHT: 293 LBS | OXYGEN SATURATION: 98 %

## 2022-06-27 VITALS — HEIGHT: 68 IN | WEIGHT: 293 LBS | BODY MASS INDEX: 44.41 KG/M2

## 2022-06-27 DIAGNOSIS — Z86.39 HISTORY OF DIABETES MELLITUS, TYPE I: ICD-10-CM

## 2022-06-27 DIAGNOSIS — I10 PRIMARY HYPERTENSION: ICD-10-CM

## 2022-06-27 DIAGNOSIS — G47.33 OSA (OBSTRUCTIVE SLEEP APNEA): ICD-10-CM

## 2022-06-27 DIAGNOSIS — Z13.21 SCREENING FOR ENDOCRINE, NUTRITIONAL, METABOLIC AND IMMUNITY DISORDER: ICD-10-CM

## 2022-06-27 DIAGNOSIS — Z13.0 SCREENING FOR ENDOCRINE, NUTRITIONAL, METABOLIC AND IMMUNITY DISORDER: ICD-10-CM

## 2022-06-27 DIAGNOSIS — K46.0: ICD-10-CM

## 2022-06-27 DIAGNOSIS — G89.29 CHRONIC PAIN OF BOTH KNEES: ICD-10-CM

## 2022-06-27 DIAGNOSIS — Z13.228 SCREENING FOR ENDOCRINE, NUTRITIONAL, METABOLIC AND IMMUNITY DISORDER: ICD-10-CM

## 2022-06-27 DIAGNOSIS — N39.3 STRESS INCONTINENCE: ICD-10-CM

## 2022-06-27 DIAGNOSIS — M25.562 CHRONIC PAIN OF BOTH KNEES: ICD-10-CM

## 2022-06-27 DIAGNOSIS — K21.9 GASTROESOPHAGEAL REFLUX DISEASE, UNSPECIFIED WHETHER ESOPHAGITIS PRESENT: ICD-10-CM

## 2022-06-27 DIAGNOSIS — M25.561 CHRONIC PAIN OF BOTH KNEES: ICD-10-CM

## 2022-06-27 DIAGNOSIS — E66.01 MORBID OBESITY (H): Primary | ICD-10-CM

## 2022-06-27 DIAGNOSIS — E66.01 MORBID OBESITY (H): ICD-10-CM

## 2022-06-27 DIAGNOSIS — F41.9 ANXIETY: ICD-10-CM

## 2022-06-27 DIAGNOSIS — Z13.29 SCREENING FOR ENDOCRINE, NUTRITIONAL, METABOLIC AND IMMUNITY DISORDER: ICD-10-CM

## 2022-06-27 DIAGNOSIS — F33.9 RECURRENT MAJOR DEPRESSIVE DISORDER, REMISSION STATUS UNSPECIFIED (H): ICD-10-CM

## 2022-06-27 PROCEDURE — 97802 MEDICAL NUTRITION INDIV IN: CPT | Performed by: DIETITIAN, REGISTERED

## 2022-06-27 PROCEDURE — 99215 OFFICE O/P EST HI 40 MIN: CPT | Performed by: PHYSICIAN ASSISTANT

## 2022-06-27 NOTE — PATIENT INSTRUCTIONS
Nice to meet you today.  Below is our plan we discussed.-  TOMAS Guevara  Plan:   Bariatric Task List  Lose 5 lbs prior to surgery.  Goal Weight: 294 lbs  Tasks:  Have preoperative laboratory tests drawn.   Psychological Evaluation with MMPI and clearance for weight loss surgery.  Achieve clearance from dietitian to see surgeon.  HP Phone calls  Transplant Clearance  Lose 5 lbs prior to surgery.  Goal Weight: 294 lbs  Have preoperative laboratory tests drawn.   Psychological Evaluation with MMPI and clearance for weight loss surgery.  Achieve clearance from dietitian to see surgeon.  HP Phone calls  Transplant Clearance  Vascular Consult for anticoagulation Plan due to  clotting history    Labs have been ordered in the system for you. You can have these drawn at any [facility] lab.  Please call [phone] set up an appointment.  Your results will be posted on [facility] as soon as they're complete.  After all are resulted, I will review and comment to you.  Psychological evaluation was ordered.  Call [phone] to be scheduled with Riky Field, PhD,    FOLLOW-UP:  Call [phone] to make appointment to return for a pre-surgery in clinic visit in 8-10 weeks to see Paola Landa PA-C. Make 1 month follow up nutrition visit virtually or in clinic.  _____________________________________________________________________  In general before being submitted for insurance approval, you will need the following:  -Clearance by the Psychologist  -Clearance by the dietitian  -Structured weight loss visits if mandated by your insurance carrier  -Surgeon consult  -Use CPAP for at least one month before surgery if you have sleep apnea. Make sure to bring your CPAP or BiPAP to the hospital at the time of surgery.  -You will need to be tobacco free for 3 months before surgery and remain a non-smoker thereafter. If you are currently smoking or have recently quit, your urine will be evaluated for tobacco metabolites  pre-operatively.  -If you have diabetes, you will need to have an A1C less than or equal to 8 within 3 months of surgery.  -You will need an exercise plan which includes MOVE, ie., walking and MUSCLE, ie.,walking, bands, weight, machines, etc...  _____________________________________________________________________  After Bariatric Surgery:   Vitamin supplementation is a lifelong need. You will take:  B-12 1000 mcg or higher sublingual (under the tongue) daily or by injection 1-2X /month  Vitamin D3 5000 IU daily   Multivitamin containing 18mg of iron twice a day  Calcium citrate 2 daily  Thiamine 100 mg weekly   Vitron C once daily if you are a menstruating female  Follow up is impotent to keep your weight off and your vitamin levels up.  Your labs will be monitored every 3 months for the first year and yearly thereafter.  Prevent Ulcers by avoiding tobacco and anti-inflammatories (NSAIDS) forever.  Also alcohol in excess, caffeine in excess. NSAIDS examples:Aspirin, Ibuprofen, Naproxen and similar medications) Tylenol is fine.  Protect you stomach if on Asprin.  If you are told by your physician take Aspirin to protect your heart or for another reason, make sure to take omeprazole or similar medication (protonix, nexium, prevacid) to protect your stomach.  Alcohol affects you differently. There is a 10% increase of Alcohol Use Disorder in patients with bariatric surgery.   If you have even ONE drink DO NOT DRIVE.

## 2022-06-27 NOTE — Clinical Note
Pt has HP.  Can you please sign her up for phone call program?  I added this to her task list.  Thanks.

## 2022-06-27 NOTE — PATIENT INSTRUCTIONS
Guanakito Mazariegos!     It was great chatting with you today! Here are some links to the information we discussed:      Vitamins/Minerals:  [email or web address]     Diet Guidelines:  [email or web address]         Here are the goals we discussed for this first month:     1. Be mindful of starch intake  - Limit starches to 25% of your plate     2. Avoid grazing between meals  - Plan ahead for small snacks that give you a boost in protein and/or fiber     3. Eat balanced meals at regular intervals  - Giving your body balanced nutrition throughout the day may help curb grazing behaviors              Your next visit can be scheduled via the call center at  and should be in one month.  Have a great week and let us know if any questions/concerns pop up!        Rissa Boyle, JOAQUIN, LD?  Clinical Dietitian

## 2022-06-27 NOTE — LETTER
Delilah Fountain  June 27, 2022    Bariatric Task List    Required Weight loss:    Lose 5 lbs prior to surgery.  Goal Weight: 294 lbs  Tasks:  Have preoperative laboratory tests drawn.     Psychological Evaluation with MMPI and clearance for weight loss surgery.    Achieve clearance from dietitian to see surgeon.    HP Phone calls    Transplant Clearance    Vascular Clearance  (for clotting history)

## 2022-06-27 NOTE — PROGRESS NOTES
"New Bariatric Nutrition Consultation Note    Reason For Visit: Nutrition Assessment    Delilah Fountain is a 56 year old presenting today for new bariatric nutrition consult.  Pt is interested in laparoscopic (undecided).  Patient is accompanied by self.    ANTHROPOMETRICS:  Estimated body mass index is 45.49 kg/m  as calculated from the following:    Height as of this encounter: 1.727 m (5' 8\").    Weight as of this encounter: 135.7 kg (299 lb 3.2 oz).    Required weight loss goal pre-op: 5 lbs from initial consult weight (goal weight 294 lbs or less before surgery)       6/27/2022   I have tried the following methods to lose weight Watching portions or calories, Exercise       Weight Loss Questions Reviewed With Patient 6/27/2022   How long have you been overweight? Since late teens through early 20's         NUTRITION HISTORY:  Recall Diet Questions Reviewed With Patient 6/27/2022   Describe what you typically consume for breakfast (typical or most recent): Greek yogurt with a little granola and/or fresh fruit; cottage cheese with canned fruit   Describe what you typically consume for lunch (typical or most recent): Salad, sandwich, soup, with veggies   Describe what you typically consume for supper (typical or most recent): Frozen meal (usually Smart Ones or equivalent), rotisserie chicken with coleslaw, fast food   Describe what you typically consume as snacks (typical or most recent): Popcorn, apples, nuts, potato chips/salty snacks, Greek yogurt, cookies   How many ounces of water, or other low calorie drinks, do you drink daily (8 oz=1 glass)? 32 oz   How many ounces of caffeine (coffee, tea, pop) do you drink daily (8 oz=1 glass)? 24 oz   How many ounces of carbonated (pop, beer, sparkling water) drinks do you drinky daily (8 oz=1 glass)? 16 oz   How many ounces of juice, pop, sweet tea, sports drinks, protein drinks, other sweetened drinks, do you drink daily (8 oz=1 glass)? 0 oz   How many ounces of " milk do you drink daily (8 oz=1 glass) 0 oz   Please indicate the type of milk: skim       Eating Habits 6/27/2022   Do you have any dietary restrictions? No   Do you currently binge eat (eat a large amount of food in a short time)? No   Are you an emotional eater? Yes   Do you get up to eat after falling asleep? No   What foods do you crave? Crunchy, salty, bit of sweet; salads       ADDITIONAL INFORMATION:  Pt is considering weight loss surgery but would like to explore non-surgical interventions first. Discussed lifestyle changes r/t surgery today; encouraged 45 minute visit next month to cover surgical pre-op requirements and also discuss non-surgical nutrition. If opts to advance with surgery, will need further education on vitamins and post-op diet advancement    NKFA but avoids grapefruit d/t medication interactions. Note history of kidney/pancreas transplant r/t T1DM in 2000.     Dining Out History Reviewed With Patient 6/27/2022   How often do you dine out? A couple of times a week.   Where do you dine out? (select all that apply) sit-down restaurants, fast food chains, take out   What types of food do you order when you dine out? Ranges: hamburgers, salads, chicken sandwich, ribs, steak, chicken breast       Physical Activity Reviewed With Patient 6/27/2022   How often do you exercise? Less than 1 time per week   What is the duration of your exercise (in minutes)? 15 Minutes   What types of exercise do you do? walking   What keeps you from being more active?  My ability to walk or move around is limited, Shortness of breath       NUTRITION DIAGNOSIS:  Obesity r/t long history of self-monitoring deficit and excessive energy intake aeb BMI >30 kg/m2.    INTERVENTION:  Intervention Provided/Education Provided on post-op diet guidelines, GI anatomy of bariatric surgeries, ways to help prepare for post-op diet guidelines pre-operatively, portion/calorie-control, mindful eating.  Provided pt with list of goals  RD contact information.      Patient Understanding: good  Expected Compliance: good    GOALS:  Limit starchy foods to 25% of meal  Avoid grazing between meals  At balanced meals at consistent intervals    Follow-Up:   Recommend 2-3 follow up visits to assist with lifestyle changes or per insurance.      Time spent with patient: 50 minutes.    Rissa Boyle RD, LD  Clinical Dietitian   [facility]      Physical Exam

## 2022-06-27 NOTE — PROGRESS NOTES
"New Bariatric Surgery Consultation Note    2022    RE: Delilah Fountain  MR#: [id number]  : 1965      Referring provider:       2022   Who referred you? [facility],Michelle       Chief Complaint/Reason for visit: evaluation for possible weight loss surgery    Dear Alban Clark MD (General),    I had the pleasure of seeing your patient, Delilah Fountain, to evaluate her obesity and consider her for possible weight loss surgery. As you know, Delilah Fountain is 56 year old.  She has a height of 5' 8\", a weight of 299 lbs 3.2 oz, and calculated Body mass index is 45.49 kg/m .     Assessment & Plan   Problem List Items Addressed This Visit     Hernia of abdominal cavity, with obstruction    Knee pain, bilateral    HTN (hypertension)    Morbid obesity (H) - Primary    Relevant Orders    Vitamin D Screen    NUTRITION REFERRAL    Adult Mental Health  Referral    HERLINDA (obstructive sleep apnea)    Gastroesophageal reflux disease, unspecified whether esophagitis present    Anxiety    Relevant Orders    Adult Mental Health  Referral    Recurrent major depressive disorder, remission status unspecified (H)    Stress incontinence    History of diabetes mellitus, type I      Other Visit Diagnoses     Screening for endocrine, nutritional, metabolic and immunity disorder        Relevant Orders    Vitamin D Screen           In summary, Delilah Fountain has Class III obesity with a body mass index of Body mass index is 45.49 kg/m . kg/m2 and the comorbidities stated above. She completed an informational seminar and is a possible candidate for bariatric surgery.   She will have to complete the following pre-requisites:    BARIATRIC TASK LIST  Lose 5 lbs prior to surgery.  Goal Weight: 294 lbs    Have preoperative laboratory tests drawn.     Psychological Evaluation with MMPI and clearance for weight loss surgery.    Achieve clearance from dietitian to see " surgeon.     Phone calls    Transplant Clearance    Vascular Consult for anticoagulation Plan due to  clotting history    Once Delilah has completed the requirements in the above tasklist and there are no further recommendations, she will see the surgeon for consultation for bariatric surgery. Delilah  verbalizes understanding of the process to surgery and the post operative schedule.  All questions were answered.     Pt to follow up in 8-10 wks for a face to face visit with me. We will discuss the available weight loss surgeries including risks and benefits, review tasklist, and do a physical exam.     60minutes spent on the date of the encounter doing chart review, history and exam, review test results, counseling, developing plan of care, documentation, and further activities as noted above.        HISTORY OF PRESENT ILLNESS:  Weight Loss History Reviewed with Patient 6/27/2022   How long have you been overweight? Since late teens through early 20's   What is the most that you have ever weighed? 311   What is the most weight you have lost? 20   I have tried the following methods to lose weight Watching portions or calories, Exercise   I have tried the following weight loss medications? (Check all that apply) None   Have you ever had weight loss surgery? No   Spring of 1999 she was diagnosed with kidney failure.  In 2000 had kidney/pancreas transplant.  Has steadily gained weight since.  Pancreas is showing insulin resistance.  She was trying to cut back on carbs and sugar and has lost 11-12 lbs.      Her friends  had the sleeve surgery a year ago and is doing well.  The big part of her concerns is that she feels she should be able lose weight by herself.      CO-MORBIDITIES OF OBESITY INCLUDE:     6/27/2022   I have the following health issues associated with obesity: Sleep Apnea, Stress Incontinence       PAST MEDICAL HISTORY:  Past Medical History:   Diagnosis Date     Allergic rhinitis      Anxiety       Blood clotting disorder (H) 07/2000    Post transplant clot in R leg; lungs in 2/2/2021     Depressive disorder 1996     Fracture     Twice, over 30 years ago     Gastro-oesophageal reflux disease      History of blood transfusion      History of diabetes mellitus 1971    Type 1     Immunosuppression (H)      Kidney failure 1999     Nonsenile cataract 2014    replaced     Other mental problems 2014    ADHD, working on diagnosis and treatment     Sleep apnea     uses cpap machine     Transplant 07/12/2000    Kidney and pancreas     Urinary incontinence 2021     Wounds and injuries 07/2000    Post kidney/pancreas transplant       PAST SURGICAL HISTORY: H/O of DVT following Transplant surgery.  Denies malignant hyperthermia or anesthesia problems with surgery. Denies PONV.  Past Surgical History:   Procedure Laterality Date     ABDOMEN SURGERY  7/12/2000; 3/2016; 5/15/2022     COLONOSCOPY  2013     EYE SURGERY      left and right cataract surgeries     HERNIA REPAIR      ventral     HERNIORRHAPHY INCISIONAL (LOCATION)  03/21/2013    Procedure: HERNIORRHAPHY INCISIONAL (LOCATION);  Open Incisional Hernia  Anesthesia General with block;  Surgeon: Rhonda Pandey MD;  Location: [facility] OR     LAPAROSCOPIC APPENDECTOMY N/A 05/15/2022    Procedure: APPENDECTOMY, LAPAROSCOPIC with lysis of Adesions;  Surgeon: Alban Lea MD;  Location: [facility] OR     TRANSPLANT  01/01/2000    kidney/pancreas       FAMILY HISTORY:   Family History   Problem Relation Age of Onset     Other Cancer Father      Thrombophilia Father      Hyperlipidemia Maternal Grandfather      Depression Maternal Grandfather      Hypertension Paternal Grandmother      Breast Cancer Sister      Breast Cancer Sister      Breast Cancer Other         SOCIAL HISTORY:   Social History Questions Reviewed With Patient 6/27/2022   Can you afford 3 meals a day?  Yes   Can you afford 50-60 dollars a month for vitamins? No   Is looking for work.  Trying to get  "something around 30 hours.  last worked in February.       HABITS:   Eating Habits:  Does a lot of grazing for eating.  Doesn't make much for meals.     Sleep Schedule:    Normal Bedtime: 4-8 Am, will sleep to10-3 PM.     Social History     Tobacco Use     Smoking status: Never Smoker     Smokeless tobacco: Never Used   Substance Use Topics     Alcohol use: Yes     Comment: Very light social drinker     Drug use: No     PSYCHOLOGICAL HISTORY:  Has hx of anxiety and depression.    Has had to self isolated due to her immunosuppressed status    ROS:     6/27/2022   Skin:  Leg swelling, Varicose veins   HEENT: Dizziness/lightheadedness   Musculoskeletal: Back pain, Swelling of legs   Cardiovascular: Shortness of breath with activity   Pulmonary: HERLINDA- on CPAP   Genitourinary: Stress incontinence   Hematological: History of blood transfusions, Family history of blood clots- dad., DVT following transplant, PE 2021   Female only: Post-menopausal   Has been struggling with depression and anxiety.  Feels she has ADHD.      EATING BEHAVIORS:     6/27/2022   Have you or anyone else thought that you had an eating disorder? No   If you answered yes to the previous eating disorder question, select the types that apply from this list: Other   If you answered \"Other\" to the type of eating disorder question above, please describe what it is: Carbohydrate addiction   Do you currently binge eat (eat a large amount of food in a short time)? No   Are you an emotional eater? Yes   Do you get up to eat after falling asleep? No       EXERCISE:     6/27/2022   How often do you exercise? Less than 1 time per week   What is the duration of your exercise (in minutes)? 15 Minutes   What types of exercise do you do? walking   What keeps you from being more active?  My ability to walk or move around is limited, Shortness of breath   Loves to swim    MEDICATIONS:  Current Outpatient Medications   Medication     azaTHIOprine (IMURAN) 50 MG tablet "     calcium carbonate (OS-RENATO 500 MG [place]. [place]) 500 MG tablet     Cetirizine HCl (ZYRTEC ALLERGY PO)     cholecalciferol (VITAMIN D3) 1000 units (25 mcg) capsule     order for DME     PROGRAF (BRAND) 1 MG capsule     sulfamethoxazole-trimethoprim (BACTRIM/SEPTRA) 400-80 MG per tablet     venlafaxine (EFFEXOR XR) 75 MG 24 hr capsule     No current facility-administered medications for this visit.        ALLERGIES:  Allergies   Allergen Reactions     Wellbutrin [Bupropion] Other (See Comments)     Pt. Reports that it made her more weird       LABS AND RECORDS REVIEWED:  Hemoglobin A1C POCT   Date Value Ref Range Status   06/25/2018 5.6 0 - 5.6 % Final     Comment:     Normal <5.7% Prediabetes 5.7-6.4%  Diabetes 6.5% or higher - adopted from ADA   consensus guidelines.       Hemoglobin A1C   Date Value Ref Range Status   03/02/2022 6.0 (H) 0.0 - 5.6 % Final     Comment:     Normal <5.7%   Prediabetes 5.7-6.4%    Diabetes 6.5% or higher     Note: Adopted from ADA consensus guidelines.     Vitamin D Deficiency screening   Date Value Ref Range Status   08/25/2017 23 20 - 75 ug/L Final     Comment:     Season, race, dietary intake, and treatment affect the concentration of   25-hydroxy-Vitamin D. Values may decrease during winter months and increase   during summer months. Values 20-29 ug/L may indicate Vitamin D insufficiency   and values <20 ug/L may indicate Vitamin D deficiency.  Vitamin D determination is routinely performed by an immunoassay specific for   25 hydroxyvitamin D3.  If an individual is on vitamin D2 (ergocalciferol)   supplementation, please specify 25 OH vitamin D2 and D3 level determination by   LCMSMS test [id number].       TSH   Date Value Ref Range Status   11/18/2016 3.95 0.40 - 4.00 mU/L Final     Sodium   Date Value Ref Range Status   05/16/2022 145 (H) 133 - 144 mmol/L Final   02/28/2020 140 133 - 144 mmol/L Final     Potassium   Date Value Ref Range Status   05/16/2022 3.3 (L) 3.4 - 5.3 mmol/L Final    09/09/2020 3.6 3.5 - 5.2 mmol/L Final     Chloride   Date Value Ref Range Status   05/16/2022 114 (H) 94 - 109 mmol/L Final   02/28/2020 110 (H) 94 - 109 mmol/L Final     Chloride (External) (External)   Date Value Ref Range Status   09/10/2020 107 (H) 96 - 106 mmol/L Final     Carbon Dioxide   Date Value Ref Range Status   02/28/2020 26 20 - 32 mmol/L Final     Carbon Dioxide (CO2)   Date Value Ref Range Status   05/16/2022 24 20 - 32 mmol/L Final     Anion Gap   Date Value Ref Range Status   05/16/2022 7 3 - 14 mmol/L Final   02/28/2020 4 3 - 14 mmol/L Final     Glucose   Date Value Ref Range Status   05/16/2022 160 (H) 70 - 99 mg/dL Final   09/09/2020 135 (H) 65 - 99 mg/dL Final     Urea Nitrogen   Date Value Ref Range Status   05/16/2022 10 7 - 30 mg/dL Final   02/28/2020 13 7 - 30 mg/dL Final     Creatinine   Date Value Ref Range Status   05/16/2022 0.74 0.52 - 1.04 mg/dL Final   09/09/2020 0.66 0.57 - 1.00 mg/dL Final     GFR Estimate   Date Value Ref Range Status   05/16/2022 >90 >60 mL/min/1.73m2 Final     Comment:     Effective December 21, 2021 eGFRcr in adults is calculated using the 2021 CKD-EPI creatinine equation which includes age and gender (Samantha neal al., NEJ, DOI: 10.1056/OGEJqm8720195)   09/09/2020 100 >59 ml/min/1.73m2 Final     Calcium   Date Value Ref Range Status   05/16/2022 8.4 (L) 8.5 - 10.1 mg/dL Final   02/28/2020 8.9 8.5 - 10.1 mg/dL Final     Bilirubin Total   Date Value Ref Range Status   05/16/2022 0.5 0.2 - 1.3 mg/dL Final   02/28/2020 0.4 0.2 - 1.3 mg/dL Final     Alkaline Phosphatase   Date Value Ref Range Status   05/16/2022 59 40 - 150 U/L Final   02/28/2020 83 40 - 150 U/L Final     ALT   Date Value Ref Range Status   05/16/2022 15 0 - 50 U/L Final   09/09/2020 15 0 - 32 IU/L Final     AST   Date Value Ref Range Status   05/16/2022 15 0 - 45 U/L Final   09/09/2020 13 0 - 40 IU/L Final     Cholesterol   Date Value Ref Range Status   03/02/2022 184 <200 mg/dL Final   09/09/2020  "136 100 - 199 mg/dL Final     HDL Cholesterol   Date Value Ref Range Status   09/09/2020 38 (L) >39 mg/dL Final     Direct Measure HDL   Date Value Ref Range Status   03/02/2022 41 (L) >=50 mg/dL Final     LDL Cholesterol Calculated   Date Value Ref Range Status   03/02/2022 122 (H) <=100 mg/dL Final   09/09/2020 74 0 - 99 mg/dL Final     Triglycerides   Date Value Ref Range Status   03/02/2022 103 <150 mg/dL Final   09/09/2020 138 0 - 149 mg/dL Final     Triglycerides (External)   Date Value Ref Range Status   09/10/2020 138 0 - 149 mg/dL Final     WBC   Date Value Ref Range Status   02/28/2020 9.5 4.0 - 11.0 10e9/L Final     WBC Count   Date Value Ref Range Status   05/16/2022 9.4 4.0 - 11.0 10e3/uL Final     Hemoglobin   Date Value Ref Range Status   05/16/2022 13.0 11.7 - 15.7 g/dL Final   02/28/2020 14.5 11.7 - 15.7 g/dL Final     Hematocrit   Date Value Ref Range Status   05/16/2022 40.1 35.0 - 47.0 % Final   02/28/2020 44.2 35.0 - 47.0 % Final     MCV   Date Value Ref Range Status   05/16/2022 98 78 - 100 fL Final   02/28/2020 94 78 - 100 fl Final     Platelet Count   Date Value Ref Range Status   05/16/2022 247 150 - 450 10e3/uL Final   02/28/2020 240 150 - 450 10e9/L Final         PHYSICAL EXAM:  /84   Pulse 90   Ht 5' 8\" (1.727 m)   Wt 299 lb 3.2 oz (135.7 kg)   LMP 11/06/2015   SpO2 98%   BMI 45.49 kg/m    GENERAL: Healthy, alert and no distress  EYES: Eyes grossly normal to inspection.  No discharge or erythema, or obvious scleral/conjunctival abnormalities.  RESP: No audible wheeze, cough, or visible cyanosis.  No visible retractions or increased work of breathing.    SKIN: Visible skin clear. No significant rash, abnormal pigmentation or lesions.  NEURO: Cranial nerves grossly intact.  Mentation and speech appropriate for age.  PSYCH: Mentation appears normal, affect normal/bright, judgement and insight intact, normal speech and appearance well-groomed.      Sincerely,     Paola Landa, " TOMAS

## 2022-06-28 NOTE — PROGRESS NOTES
[facility]  [address] [place] [address] [address], [place] [address]  Main: [phone], Fax: [phone]    Patient seen at: [facility] at [address] [place] [address]    Video Visit Note: Patient called and changed to video visit at time of appointment.     Patient: Delilah Fountain  MRN: [id number]  : 1965  DONALDO: 2022    Reason for visit:  Follow up recurrent DVT    Clinical History Summary:  Delilah Fountain is a 56 year old female with past medical history significant for type 1 DM s/p pancreas/kidney transplant with postoperative DVT in  treated with LMWH and permanent IVC filter. In 2020, she had unprovoked bilateral pulmonary embolism and was treated with LMWH and warfarin initially due to concern about drug interactions and morbid obesity. She eventually was changed to Xarelto 20mg once daily. Due to shortness of breath symptoms reported at her 2021 visit, a peak Rivaroxaban level was checked to ensure adequate absorption. Unfortunately, she had missed her dose the morning of the lab testing. She has not completed this yet.     Interim History:  Today, Delilah Fountain notes she is doing fairly well. She did have hospital admission 2022 for appendicitis with sepsis who underwent appendectomy and lysis of adhesions on 5/15 after Xarelto was held for for 48 hours. She was restarted on Eliquis 48 hours after the procedure on . She tolerated Eliquis well without any bleeding side effects. She recently ran out and is back on Xarelto 20mg once daily now. She denies any lower extremity edema or pain symptoms. No chest pain or dyspnea. She is considering bariatric surgery (sleeve) procedure and just started the initial process for this. She is still debating whether or not to pursue it.     ROS:  Denies any bleeding complications. Specifically, no frequent epistaxis. No issues with oral mucosal  bleeding. Denies any hematuria or blood in stools. Denies any shortness of breath. No chest pain. No cough. No fever.    Surgical sites healing. One with scab still.     Medications:   Current Outpatient Medications   Medication Sig Dispense Refill     azaTHIOprine (IMURAN) 50 MG tablet Take 3 tablets (150 mg) by mouth daily 90 tablet 11     calcium carbonate (OS-RENATO 500 MG [place]. [place]) 500 MG tablet Take 500 mg by mouth 2 times daily       Cetirizine HCl (ZYRTEC ALLERGY PO) Take 5 mg by mouth 2 times daily       cholecalciferol (VITAMIN D3) 1000 units (25 mcg) capsule Take 1 capsule by mouth daily. 30 capsule 11     order for DME AIRSENSE 10  10-15CM/H20  PILLOWS [employer] FX FOR HER       PROGRAF (BRAND) 1 MG capsule Take 2 capsules (2 mg) by mouth 2 times daily 120 capsule 11     sulfamethoxazole-trimethoprim (BACTRIM/SEPTRA) 400-80 MG per tablet Take 1 tablet by mouth Every Mon, Wed, Fri Morning 40 tablet 0     venlafaxine (EFFEXOR XR) 75 MG 24 hr capsule Take 150 mg by mouth 2 times daily          Allergies:      Allergies   Allergen Reactions     Wellbutrin [Bupropion] Other (See Comments)     Pt. Reports that it made her more weird       PMH:  Past Medical History:   Diagnosis Date     Allergic rhinitis      Anxiety      Blood clotting disorder (H) 07/2000    Post transplant clot in R leg; lungs in 2/2/2021     Depressive disorder 1996     Fracture     Twice, over 30 years ago     Gastro-oesophageal reflux disease      History of blood transfusion      History of diabetes mellitus 1971    Type 1     Immunosuppression (H)      Kidney failure 1999     Nonsenile cataract 2014    replaced     Other mental problems 2014    ADHD, working on diagnosis and treatment     Sleep apnea     uses cpap machine     Transplant 07/12/2000    Kidney and pancreas     Urinary incontinence 2021     Wounds and injuries 07/2000    Post kidney/pancreas transplant        Social History:   Social History     Tobacco Use     Smoking status:  Never Smoker     Smokeless tobacco: Never Used   Substance Use Topics     Alcohol use: Yes     Comment: Very light social drinker     Drug use: No       Family History:  Deferred.    Objective:  Vitals: LMP 11/06/2015  No vitals - VV     Exam:   Constitutional: Appears well, no distress  HEENT: normocephalic, atraumatic  Respiratory: no increased work of breathing.   Skin: no ecchymosis on exposed dermis.  Neuro: AOx3      Labs:  CBC RESULTS:   Recent Labs   Lab Test 05/16/22  1039   WBC 9.4   RBC 4.08   HGB 13.0   HCT 40.1   MCV 98   MCH 31.9   MCHC 32.4   RDW 14.0        Last Comprehensive Metabolic Panel:  Sodium   Date Value Ref Range Status   05/16/2022 145 (H) 133 - 144 mmol/L Final   02/28/2020 140 133 - 144 mmol/L Final     Potassium   Date Value Ref Range Status   05/16/2022 3.3 (L) 3.4 - 5.3 mmol/L Final   09/09/2020 3.6 3.5 - 5.2 mmol/L Final     Chloride   Date Value Ref Range Status   05/16/2022 114 (H) 94 - 109 mmol/L Final   02/28/2020 110 (H) 94 - 109 mmol/L Final     Chloride (External) (External)   Date Value Ref Range Status   09/10/2020 107 (H) 96 - 106 mmol/L Final     Carbon Dioxide   Date Value Ref Range Status   02/28/2020 26 20 - 32 mmol/L Final     Carbon Dioxide (CO2)   Date Value Ref Range Status   05/16/2022 24 20 - 32 mmol/L Final     Anion Gap   Date Value Ref Range Status   05/16/2022 7 3 - 14 mmol/L Final   02/28/2020 4 3 - 14 mmol/L Final     Glucose   Date Value Ref Range Status   05/16/2022 160 (H) 70 - 99 mg/dL Final   09/09/2020 135 (H) 65 - 99 mg/dL Final     Urea Nitrogen   Date Value Ref Range Status   05/16/2022 10 7 - 30 mg/dL Final   02/28/2020 13 7 - 30 mg/dL Final     Creatinine   Date Value Ref Range Status   05/16/2022 0.74 0.52 - 1.04 mg/dL Final   09/09/2020 0.66 0.57 - 1.00 mg/dL Final     GFR Estimate   Date Value Ref Range Status   05/16/2022 >90 >60 mL/min/1.73m2 Final     Comment:     Effective December 21, 2021 eGFRcr in adults is calculated using the  2021 CKD-EPI creatinine equation which includes age and gender (Samantha et al., NE, DOI: 10.1056/TNVAag9413215)   09/09/2020 100 >59 ml/min/1.73m2 Final     Calcium   Date Value Ref Range Status   05/16/2022 8.4 (L) 8.5 - 10.1 mg/dL Final   02/28/2020 8.9 8.5 - 10.1 mg/dL Final     Liver Function Studies -   Recent Labs   Lab Test 05/16/22  1039   PROTTOTAL 6.8   ALBUMIN 2.5*   BILITOTAL 0.5   ALKPHOS 59   AST 15   ALT 15        Imaging:  No results found for this or any previous visit (from the past 744 hour(s)).     Assessment:  Delilah Fountain is a 56 year old female with history of type 1 diabetes s/p pancreas/kidney transplant with postoperative DVT in 2000 treated initially with LMWH and IVC filter (permanent) and unprovoked bilateral PE in 2/2020 who was initially treated with warfarin but eventually changed to Xarelto with plan for lifelong anticoagulation. She has not had any recurrent DVT/PE nor any bleeding complications on Xarelto. She was recently discharged from urgent appendectomy on Eliquis. She ran out of that and was placed back on Xarelto. She is considering future bariatric surgery.    Plan:  Delilah remains appropriate candidate for long term anticoagulation.   We discussed use of Xarelto or changing over to Eliquis. I counseled the patient that if she did pursue bariatric surgery that I would recommend changing to Eliquis due to gastric absorption concerns with altered anatomy. She will let us know if she does pursue surgery or if she wishes to change to Eliquis at any time. Reviewed bleeding profile of Eliquis vs Xarelto in detail.     Tentative bariatric surgery AC plan:   Hold anticoagulation 48 hours prior to procedure.  Would use therapeutic Lovenox for 2 weeks after bariatric surgery then transition to Eliquis 5mg twice daily afterwards.     Discussed checking peak level of anticoagulant in this next year. We shall first decide on Xarelto continuation or if she plans on changing to  Eliquis.     Patient instructed to contact the clinic should they require any surgical procedures for perioperative planning.     Return to clinic in 6 months, sooner if any concerns.       Mamie Huang, MPH, PA-C  [facility]    30 minutes spent on the date of the encounter doing chart review, review of outside records, review of test results, interpretation of tests, patient visit and documentation       Video-Visit Details:  Type of service:  Video Visit  Video Start Time: 2:31pm  Video End Time (time video stopped): 2:56pm   Originating Location (pt. Location): Home  Distant Location (provider location):  [facility]  Mode of Communication:  Video Conference via [employer]

## 2022-06-29 ENCOUNTER — VIRTUAL VISIT (OUTPATIENT)
Dept: HEMATOLOGY | Facility: CLINIC | Age: 57
End: 2022-06-29
Attending: PHYSICIAN ASSISTANT
Payer: COMMERCIAL

## 2022-06-29 VITALS — WEIGHT: 293 LBS | BODY MASS INDEX: 45.46 KG/M2

## 2022-06-29 DIAGNOSIS — Z86.718 PERSONAL HISTORY OF DVT (DEEP VEIN THROMBOSIS): ICD-10-CM

## 2022-06-29 DIAGNOSIS — Z86.711 HISTORY OF PULMONARY EMBOLISM: Primary | ICD-10-CM

## 2022-06-29 DIAGNOSIS — E66.01 MORBID OBESITY (H): ICD-10-CM

## 2022-06-29 DIAGNOSIS — Z71.89 ENCOUNTER FOR ANTICOAGULATION DISCUSSION AND COUNSELING: ICD-10-CM

## 2022-06-29 PROCEDURE — 99213 OFFICE O/P EST LOW 20 MIN: CPT | Mod: GT | Performed by: PHYSICIAN ASSISTANT

## 2022-06-29 NOTE — PATIENT INSTRUCTIONS
[facility]  [facility]  [facility] [address], [address], [place] [address]  Main: [phone], Fax: [phone]    Delilah,   It was a pleasure seeing you today.  Thank you for allowing us to be involved in your care.  Please let us know if there is anything else we can do for you, so that we can be sure you are leaving completely satisfied with your care experience. Below is the plan that we discussed.     Let me know if you wish to change to Eliquis or stick with Xarelto for now.   We should check a Xarelto level if you are not changing to Eliquis or pursuing bariatric surgery.   If you do pursue bariatric surgery, please call and let us know date of procedure so we can finalize anticoagulation plan and transition you to Lovenox briefly after the procedure and ultimately back to Eliquis    We would like a provider on our team to see you at least annually for optimal care and to allow us to continue to prescribe for you.      Return to clinic in  6 months.      Your nurse clinician is Lizy Russ, MSN, RN, [phone].   If they are unavailable and you have immediate concerns, please call [phone] and ask for a nurse.     Mamie Huang, MPH, PA-C  [facility]

## 2022-06-29 NOTE — PROGRESS NOTES
Patient was contacted to complete the pre-visit call prior to their video visit with the provider.      Allergies and medications were reviewed.    I thanked them for their time to cover this information.    Brigida Pierre

## 2022-07-18 ENCOUNTER — VIRTUAL VISIT (OUTPATIENT)
Dept: SURGERY | Facility: CLINIC | Age: 57
End: 2022-07-18

## 2022-07-18 DIAGNOSIS — E66.01 MORBID OBESITY (H): Primary | ICD-10-CM

## 2022-07-18 NOTE — LETTER
7/18/2022         RE: Delilah Fountain  [address]  [facility] [address]        Dear Colleague,    Thank you for referring your patient, Delilah Fountain, to the [facility] AND BARIATRICS CARE [place]. Please see a copy of my visit note below.    Video-Visit Details    Type of service:  Video Visit    Video Start Time (time video started): 3:28 PM    Video End Time (time video stopped): 4:23 PM    Originating Location (pt. Location): Home    Distant Location (provider location):  Home office    Mode of Communication:  Video Conference via [employer]    Physician has received verbal consent for a Video Visit from the patient? Yes    Delilah would like to follow through with bariatric surgery for health reasons. Yest she is not 100% certain she wants to have surgery. She has struggled with her weight for much of her life and now is concerned about various comorbidities. She also had a kidney and pancreas transplant which was somewhat traumatic for her. 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 She has a history of stress, emotional and boredom eating. She has good knowledge of surgery and good support. She is medicated and in psychotherapy for depression and anxiety. She will follow up and complete psychological testing. F32.9; F41.1; E66.01    Riky Field, PhD            Again, thank you for allowing me to participate in the care of your patient.        Sincerely,        Riky Field, PhD

## 2022-07-18 NOTE — PROGRESS NOTES
Video-Visit Details    Type of service:  Video Visit    Video Start Time (time video started): 3:28 PM    Video End Time (time video stopped): 4:23 PM    Originating Location (pt. Location): Home    Distant Location (provider location):  Home office    Mode of Communication:  Video Conference via [facility]    Physician has received verbal consent for a Video Visit from the patient? Yes    Delilah would like to follow through with bariatric surgery for health reasons. Yest she is not 100% certain she wants to have surgery. She has struggled with her weight for much of her life and now is concerned about various comorbidities. She also had a kidney and pancreas transplant which was somewhat traumatic for her. 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 She has a history of stress, emotional and boredom eating. She has good knowledge of surgery and good support. She is medicated and in psychotherapy for depression and anxiety. She will follow up and complete psychological testing. F32.9; F41.1; E66.01    Riky Field, PhD

## 2022-07-26 ENCOUNTER — TELEPHONE (OUTPATIENT)
Dept: NEPHROLOGY | Facility: CLINIC | Age: 57
End: 2022-07-26

## 2022-07-26 NOTE — TELEPHONE ENCOUNTER
[facility] Prior Authorization Team   Phone: [phone]  Fax: [phone]    PA Initiation    Medication: Prograf (PA Initiated)  Insurance Company: [employer] - Phone [phone] Fax [phone]  Pharmacy Filling the Rx: [facility] - [place], [place] - [address]  Filling Pharmacy Phone: [phone]  Filling Pharmacy Fax:    Start Date: 7/26/2022

## 2022-07-26 NOTE — TELEPHONE ENCOUNTER
Prior Authorization Approval    Authorization Effective Date: 6/27/2022  Authorization Expiration Date: 7/26/2023  Medication: Prograf (PA Approved)  Approved Dose/Quantity: 120 for 30ds  Reference #: [id number]   Insurance Company: [employer] d[employer] - Phone [phone] Fax [phone]  Expected CoPay: $3     CoPay Card Available: No    Foundation Assistance Needed:    Which Pharmacy is filling the prescription (Not needed for infusion/clinic administered): [facility] - [place], [place] - [address]  Pharmacy Notified: Yes  Patient Notified: Yes

## 2022-07-29 ENCOUNTER — VIRTUAL VISIT (OUTPATIENT)
Dept: SURGERY | Facility: CLINIC | Age: 57
End: 2022-07-29
Payer: COMMERCIAL

## 2022-07-29 VITALS — HEIGHT: 68 IN | WEIGHT: 292 LBS | BODY MASS INDEX: 44.25 KG/M2

## 2022-07-29 DIAGNOSIS — E66.01 MORBID OBESITY (H): ICD-10-CM

## 2022-07-29 PROCEDURE — 97803 MED NUTRITION INDIV SUBSEQ: CPT | Mod: GT | Performed by: DIETITIAN, REGISTERED

## 2022-07-29 NOTE — PROGRESS NOTES
"Delilah is a 56 year old who is being evaluated via a billable video visit.      How would you like to obtain your AVS? Valeriat  If the video visit is dropped, the invitation should be resent by: Text to cell phone: [phone]  Will anyone else be joining your video visit? No          Video-Visit Details    Video Start Time: 3:00pm    Type of service:  Video Visit    Video End Time:3:43pm    Originating Location (pt. Location): Home    Distant Location (provider location):  Provider Remote Setting    Platform used for Video Visit: [facility]      PRE SURGICAL WEIGHT LOSS NUTRITION APPOINTMENT    Delilah Fountain  1965  female  [id number]  56 year old    ASSESSMENT    Desired Surgical Procedure: (undecided)    REASON FOR VISIT:  Delilah Fountain is a 56 year old year old female presents today for a pre-surgical weight loss follow-up appointment. Patient accompanied by self.    DIAGNOSIS:  Weight Status Obesity Grade III BMI >40    ANTHROPOMETRICS:  Height: 68\"    Initial Weight: 299.2 lbs     Current weight: 292 lbs 0 oz    BMI: Body mass index is 44.4 kg/m .    VITAMINS AND MINERALS:  N/a - discussed today    NUTRITION HISTORY:  Breakfast: [wakes at 10-12pm] within 1 hour of waking] yogurt +/- granola  cottage cheese + canned fruit   Lunch: [grazes] apple or celery +/- peanut butter  cheese & crackers  popcorn   Supper: [9-10pm] fast food - burger or chicken sandwich  salad (pre-bagged or from restaurant) + canned chicken  soup  leftover ribs + potatoes   rotisserie chicken   Snacks: (see lunch)    Fluids Consumed: water, coffee, diet soda, sparkling water, EtOH (occasional)   Eating slower: Yes  Chewing foods thoroughly: Sometimes  Take 20-30 minutes to consume each meal: Yes  Fluids and meals separate by at least 30 minutes: No  Carbonation: yes  Caffeine: yes  Additional Information: Pt has decided to actively pursue surgery after discussion with PsyD regarding long-term weight loss goals. " Reviewed several guidelines today as well as behavioral expectations for surgery readiness. Discussed vitamins, minerals and post-op diet advancement.     PHYSICAL ACTIVITY:  No intentional exercise but trying to be more active - parking further away in parking lots, running more errands    DIAGNOSIS:  Previous Nutrition Diagnosis: Obesity related to long history of self- monitoring deficit and excessive energy intake evidenced by BMI of 45.49 kg/m2  No change, modified below    Previous goals:   Limit starchy foods to 25% of meal - improving  Avoid grazing between meals - not met  At balanced meals at consistent intervals - partially met    Current Nutrition Diagnosis: Obesity related to long history of self-monitoring deficit and excessive energy intake as evidenced by BMI of 44.4 kg/m2.    INTERVENTION:  Nutrition Prescription: Recommended energy/nutrient modification.    GOALS:  Begin taking multivitamin, calcium and vitamin D per guidelines  Eat 3 meals/day at regular intervals  Avoid grazing - have scheduled mealtimes    Intervention:  - Discussed progress towards previous goals.  - Reinforced importance of making behavior changes in preparation for bariatric surgery.   - Assessed learning needs and learning preferences       NUTRITION MONITORING AND EVALUATION:  Anticipated compliance: fair-good  Patient demonstrated fair-good understanding.     Follow up: Continue to monitor patient closely regarding weight loss and diet.  # of visits needed: 2-3  Cleared by RD: No     TIME SPENT WITH PATIENT: 43 minutes      Rissa Boyle RD, LD  Clinical Dietitian

## 2022-07-29 NOTE — PATIENT INSTRUCTIONS
Guanakito Mazariegos!    It was great chatting with you again today today! Here are some links to the information we discussed:         Vitamins and Minerals  [email or web address]     Diet Guidelines:  [email or web address]     Your Stage 1 Diet: Clear Liquids  [email or web address]     Your Stage 2 Diet: Low-fat Full Liquids  [email or web address]     Your Stage 3 Diet: Pureed Foods  [email or web address]     Your Stage 4 Diet: Soft Foods  [email or web address]    Your Stage 5 Diet: Regular Foods  [email or web address]       Here are the goals we discussed for this first month:    1. Begin taking a daily multivitamin , calcium, and vitamin D supplement   - The doses/requirements for these are in the vitamin/mineral handout link above.   - The easiest schedule will be to take calcium 2-3x/ per day, and take everything else at bedtime.      2. Eat balanced meals at regular intervals  - Eat scheduled meals rather than grazing  - Have your first meal within 1 hour of waking up, then eat a meal every 4-6 hours             Your next visit can be scheduled via the call center at  and should be in one month. Have a great week and let us know if any questions/concerns pop up!     Rissa Boyle, JOAQUIN, LD?  Clinical Dietitian

## 2022-08-01 ENCOUNTER — MYC MEDICAL ADVICE (OUTPATIENT)
Dept: TRANSPLANT | Facility: CLINIC | Age: 57
End: 2022-08-01

## 2022-08-01 ENCOUNTER — VIRTUAL VISIT (OUTPATIENT)
Dept: SURGERY | Facility: CLINIC | Age: 57
End: 2022-08-01

## 2022-08-01 DIAGNOSIS — E66.01 MORBID OBESITY (H): Primary | ICD-10-CM

## 2022-08-01 NOTE — PROGRESS NOTES
Video-Visit Details    Type of service:  Video Visit    Video Start Time (time video started): 3:04 PM    Video End Time (time video stopped): 3:55 PM    Originating Location (pt. Location): Home    Distant Location (provider location):  Home office     Mode of Communication:  Video Conference via [employer]    Physician has received verbal consent for a Video Visit from the patient? Yes    Recommendations: This patient is not 100% certain she would like to follow through with bariatric surgery as she finds it to be  extreme.  Thus, she will continue in the non-surgical weight management program for the time being. The final decision to follow through with bariatric surgery should be done in collaboration with [facility] Comprehensive Weight Management Program staff.    Current Treatment Plan and Aftercare Plan: She will follow up with the bariatric dietitian and bariatrician to improve nutrition and eating behaviors, maintain mindful eating behaviors, document her eating, measure her food, plan out her meals, increase activity level and follow up with this clinician only if she is 100% certain she wants to follow through with surgery. She will continue individual psychotherapy to focus on improved coping mechanisms and maintain medication management to promote mood stability.  F32.9; F41.9; E66.01    Riky Field, PhD

## 2022-08-01 NOTE — LETTER
8/1/2022         RE: Delilah Fountain  [address]  [facility] [address]        Dear Colleague,    Thank you for referring your patient, Delilah Fountain, to the [facility] SURGERY CL[facility]IC AND BARIATRICS CARE [place]. Please see a copy of my visit note below.    Video-Visit Details    Type of service:  Video Visit    Video Start Time (time video started): 3:04 PM    Video End Time (time video stopped): 3:55 PM    Originating Location (pt. Location): Home    Distant Location (provider location):  Home office     Mode of Communication:  Video Conference via [employer] [facility] [employer]    Physician has received verbal consent for a Video Visit from the patient? Yes    Recommendations: This patient is not 100% certain she would like to follow through with bariatric surgery as she finds it to be  extreme.  Thus, she will continue in the non-surgical weight management program for the time being. The final decision to follow through with bariatric surgery should be done in collaboration with [facility] Comprehensive Weight Management Program staff.    Current Treatment Plan and Aftercare Plan: She will follow up with the bariatric dietitian and bariatrician to improve nutrition and eating behaviors, maintain mindful eating behaviors, document her eating, measure her food, plan out her meals, increase activity level and follow up with this clinician only if she is 100% certain she wants to follow through with surgery. She will continue individual psychotherapy to focus on improved coping mechanisms and maintain medication management to promote mood stability.  F32.9; F41.9; E66.01    Riky Field, PhD            Again, thank you for allowing me to participate in the care of your patient.        Sincerely,        Riky Field, PhD

## 2022-08-18 ENCOUNTER — MYC MEDICAL ADVICE (OUTPATIENT)
Dept: SURGERY | Facility: CLINIC | Age: 57
End: 2022-08-18

## 2022-08-21 NOTE — PROGRESS NOTES
Delilah is a 56 year old who is being evaluated via a billable video visit.      If the video visit is dropped, the invitation should be resent by: Text to cell phone: [phone]  Will anyone else be joining your video visit? No      Video-Visit Details    Type of service:  Video Visit    Video Start Time: 3:39 PM    Video End Time:4:08 PM    Originating Location (pt. Location): Home    Distant Location (provider location):  [facility] WE[facility] [place]     Platform used for Video Visit: [employer]    2022      Return Medical Weight Management Note     Delilah Fountain  MRN:  [id number]  :  1965    Dear Alban Clark MD,    I had the pleasure of seeing your patient Delilah Fountain. She is a 56 year old female who I am continuing to see for treatment of obesity related to:       2022   I have the following health issues associated with obesity: Sleep Apnea, Stress Incontinence       Assessment & Plan   Problem List Items Addressed This Visit     Morbid obesity (H) - Primary     We discussed healthy habits to assist with weight loss. We discussed medication that may assist with weight loss. Reviewed phentermine and Saxenda. Risks/ benefits and possible side effects were discussed and questions were answered. Insurance dictates we must use phentermine unless contraindicated 1st before other AOM covered.  Will message transplant team to see if this is reason for contraindication.  If not will start phentermine.  Otherwise, will start Saxenda since pt has elevated HgA1C.                   Prediabetes     Recommend we start GLP-1 as primary weight loss medication to assist in insulin resistance.                  PATIENT INSTRUCTIONS:  Insurance dictates we must use phentermine unless contraindicated 1st before other weight loss medications like Saxenda are covered.  I'll message your transplant team to see if this is reason for contraindication.  If not will start  phentermine.  Otherwise, will start Saxenda.       Check back if you don't hear anything my next Monday.     Goals: Continue working with your health  and therapist on your sleep schedule.     FOLLOW-UP:    Please call [phone] to schedule your next visit in 8-12 weeks and with the dietician now.     52 minutes spent on the date of the encounter doing chart review, history and exam, result review, counseling, developing plan of care, documentation, and further activities as noted          She has the following co-morbidities:       6/27/2022   I have the following health issues associated with obesity: Sleep Apnea, Stress Incontinence     Interval History: Delilah initally seen for NBS consult in June.  but decided against pursuing bariatric surgery. Returns today for medical weight management. She was interested in starting a medication to help with her prediabetes. I  messaged Dr. Crespo her transplant [facility] regarding starting a GLP-1. The team felt this was appropriate.     Delilah is currently seeing a therapist and also health  through her insurance.     Is looking into ADHD testing/treatment.  Feels this is a key piece that is missing for her. Has trouble keeping focused during the day.  Does not have a regular sleep/ wake schedule.  Delilah is working on her sleep habits.  She is settting an alarm. Will get into a good book and all of a sudden it will be 2 am and she is still up.   Having trouble getting on a schedule.  She does not work and is concerned if she started to work right now she would not be successful.  She notices she often starts attempts at healthy lifestyle changes but easily gets distracted or off course.     She would like to start an AOM.  Delilah does not feel she is always having hunger.  May eat out of boredom or habit.     .  BP Readings from Last 6 Encounters:   06/27/22 136/84   05/16/22 123/66   03/03/22 (!) 158/92   02/29/20 119/83   07/05/18 160/79   06/19/17 117/85      Pulse Readings from Last 6 Encounters:   06/27/22 90   05/16/22 94   03/03/22 91   02/29/20 89   07/05/18 98   06/19/17 108       WEIGHT METRICS:  Body mass index is 44.34 kg/m .   Current Weight: 291 lb 9.6 oz (132.3 kg) (pt reported)  Last Visits Weight: 292 lb (132.5 kg)  Initial Weight (lbs): 299.2 lbs  Cumulative weight loss (lbs): 7.6  Weight Loss Percentage: 2.54%    Wt Readings from Last 10 Encounters:   08/23/22 291 lb 9.6 oz (132.3 kg)   07/29/22 292 lb (132.5 kg)   06/29/22 299 lb (135.6 kg)   06/27/22 299 lb 3.2 oz (135.7 kg)   06/27/22 299 lb 3.2 oz (135.7 kg)   03/03/22 311 lb (141.1 kg)   09/16/20 310 lb (140.6 kg)   06/11/20 302 lb (137 kg)   02/29/20 297 lb (134.7 kg)   07/05/18 303 lb (137.4 kg)      AOM ROS:  Phentermine:  Denies Active CAD, Uncontrolled HTN ,Cardiac Arrythmias, Hyperthyroidism ,Closed angle glaucoma.  GLP-1:    Denies Medullary Thyroid Ca (incl Fhx), MEN type II, No pancreatitis Hx since transplant.   hronic Malabsorption syndrome  MEDICATIONS:   Current Outpatient Medications   Medication     azaTHIOprine (IMURAN) 50 MG tablet     calcium carbonate (OS-RENATO 500 MG [place]. [place]) 500 MG tablet     Cetirizine HCl (ZYRTEC ALLERGY PO)     cholecalciferol (VITAMIN D3) 1000 units (25 mcg) capsule     order for DME     PROGRAF (BRAND) 1 MG capsule     sulfamethoxazole-trimethoprim (BACTRIM/SEPTRA) 400-80 MG per tablet     venlafaxine (EFFEXOR XR) 75 MG 24 hr capsule     No current facility-administered medications for this visit.       LABS:  Hemoglobin A1C   Date Value Ref Range Status   03/02/2022 6.0 (H) 0.0 - 5.6 % Final     Comment:     Normal <5.7%   Prediabetes 5.7-6.4%    Diabetes 6.5% or higher     Note: Adopted from ADA consensus guidelines.   06/25/2018 5.6 0 - 5.6 % Final     Comment:     Normal <5.7% Prediabetes 5.7-6.4%  Diabetes 6.5% or higher - adopted from ADA   consensus guidelines.       Vitamin D Deficiency screening   Date Value Ref Range Status   08/25/2017 23 20 -  75 ug/L Final     Comment:     Season, race, dietary intake, and treatment affect the concentration of   25-hydroxy-Vitamin D. Values may decrease during winter months and increase   during summer months. Values 20-29 ug/L may indicate Vitamin D insufficiency   and values <20 ug/L may indicate Vitamin D deficiency.  Vitamin D determination is routinely performed by an immunoassay specific for   25 hydroxyvitamin D3.  If an individual is on vitamin D2 (ergocalciferol)   supplementation, please specify 25 OH vitamin D2 and D3 level determination by   LCMSMS test VITD23.       Sodium   Date Value Ref Range Status   05/16/2022 145 (H) 133 - 144 mmol/L Final   02/28/2020 140 133 - 144 mmol/L Final     Potassium   Date Value Ref Range Status   05/16/2022 3.3 (L) 3.4 - 5.3 mmol/L Final   09/09/2020 3.6 3.5 - 5.2 mmol/L Final     Chloride   Date Value Ref Range Status   05/16/2022 114 (H) 94 - 109 mmol/L Final   02/28/2020 110 (H) 94 - 109 mmol/L Final     Chloride (External) (External)   Date Value Ref Range Status   09/10/2020 107 (H) 96 - 106 mmol/L Final     Carbon Dioxide   Date Value Ref Range Status   02/28/2020 26 20 - 32 mmol/L Final     Carbon Dioxide (CO2)   Date Value Ref Range Status   05/16/2022 24 20 - 32 mmol/L Final     Anion Gap   Date Value Ref Range Status   05/16/2022 7 3 - 14 mmol/L Final   02/28/2020 4 3 - 14 mmol/L Final     Glucose   Date Value Ref Range Status   05/16/2022 160 (H) 70 - 99 mg/dL Final   09/09/2020 135 (H) 65 - 99 mg/dL Final     Urea Nitrogen   Date Value Ref Range Status   05/16/2022 10 7 - 30 mg/dL Final   02/28/2020 13 7 - 30 mg/dL Final     Creatinine   Date Value Ref Range Status   05/16/2022 0.74 0.52 - 1.04 mg/dL Final   09/09/2020 0.66 0.57 - 1.00 mg/dL Final     GFR Estimate   Date Value Ref Range Status   05/16/2022 >90 >60 mL/min/1.73m2 Final     Comment:     Effective December 21, 2021 eGFRcr in adults is calculated using the 2021 CKD-EPI creatinine equation which  "includes age and gender (Samantha neal al., NE, DOI: 10.1056/ZAJQza3603233)   09/09/2020 100 >59 ml/min/1.73m2 Final     Calcium   Date Value Ref Range Status   05/16/2022 8.4 (L) 8.5 - 10.1 mg/dL Final   02/28/2020 8.9 8.5 - 10.1 mg/dL Final     Bilirubin Total   Date Value Ref Range Status   05/16/2022 0.5 0.2 - 1.3 mg/dL Final   02/28/2020 0.4 0.2 - 1.3 mg/dL Final     Alkaline Phosphatase   Date Value Ref Range Status   05/16/2022 59 40 - 150 U/L Final   02/28/2020 83 40 - 150 U/L Final     ALT   Date Value Ref Range Status   05/16/2022 15 0 - 50 U/L Final   09/09/2020 15 0 - 32 IU/L Final     AST   Date Value Ref Range Status   05/16/2022 15 0 - 45 U/L Final   09/09/2020 13 0 - 40 IU/L Final     Cholesterol   Date Value Ref Range Status   03/02/2022 184 <200 mg/dL Final   09/09/2020 136 100 - 199 mg/dL Final     HDL Cholesterol   Date Value Ref Range Status   09/09/2020 38 (L) >39 mg/dL Final     Direct Measure HDL   Date Value Ref Range Status   03/02/2022 41 (L) >=50 mg/dL Final     LDL Cholesterol Calculated   Date Value Ref Range Status   03/02/2022 122 (H) <=100 mg/dL Final   09/09/2020 74 0 - 99 mg/dL Final     Triglycerides   Date Value Ref Range Status   03/02/2022 103 <150 mg/dL Final   09/09/2020 138 0 - 149 mg/dL Final     Triglycerides (External)   Date Value Ref Range Status   09/10/2020 138 0 - 149 mg/dL Final        PE:  Ht 5' 8\" (1.727 m)   Wt 291 lb 9.6 oz (132.3 kg)   LMP 11/06/2015   BMI 44.34 kg/m    GENERAL: Healthy, alert and no distress  EYES: Eyes grossly normal to inspection.  No discharge or erythema, or obvious scleral/conjunctival abnormalities.  RESP: No audible wheeze, cough, or visible cyanosis.  No visible retractions or increased work of breathing.    SKIN: Visible skin clear. No significant rash, abnormal pigmentation or lesions.  NEURO: Cranial nerves grossly intact.  Mentation and speech appropriate for age.  PSYCH: Mentation appears normal, affect normal/bright, judgement " and insight intact, normal speech and appearance well-groomed.    Sincerely,      Poala Landa PA-C

## 2022-08-23 ENCOUNTER — VIRTUAL VISIT (OUTPATIENT)
Dept: SURGERY | Facility: CLINIC | Age: 57
End: 2022-08-23
Payer: COMMERCIAL

## 2022-08-23 VITALS — HEIGHT: 68 IN | WEIGHT: 291.6 LBS | BODY MASS INDEX: 44.19 KG/M2

## 2022-08-23 DIAGNOSIS — R73.03 PREDIABETES: ICD-10-CM

## 2022-08-23 DIAGNOSIS — E66.01 MORBID OBESITY (H): Primary | ICD-10-CM

## 2022-08-23 PROCEDURE — 99215 OFFICE O/P EST HI 40 MIN: CPT | Mod: GT | Performed by: PHYSICIAN ASSISTANT

## 2022-08-23 NOTE — PATIENT INSTRUCTIONS
"Nice to talk with you today. Thank you for allowing me the privilege of caring for you. We hope we provided you with the excellent service you deserve.     To ensure the quality of our services you may receive a patient satisfaction survey from an independent monitoring company.  The greatest compliment you can give is \"Likely to Recommend\"    Below is our plan we discussed.-  TOMAS Guevara      Plan:  Insurance dictates we must use phentermine unless contraindicated 1st before other weight loss medications like Saxenda are covered.  I'll message your transplant team to see if this is reason for contraindication.  If not will start phentermine.  Otherwise, will start Saxenda.       Check back if you don't hear anything my next Monday.     Goals: Continue working with your health  and therapist on your sleep schedule.     FOLLOW-UP:    Please call [phone] to schedule your next visit in 8-12 weeks and with the dietician now.     10 Healthy Habits  Eat Better ? Move More ? Live Well   Eat breakfast daily.     Try to eat within the first hour after you wake up. This helps you control your appetite during the day, and you are less likely to snack in the evening.    80% of people who skip meals are overweight or obese.    2.   Include protein with every meal, even breakfast.    Protein will suppress your appetite longer than other types of food. This helps you  feel more satisfied with the amount of food you have eaten.    3.  Fill up on Fiber   Fiber comes from plants--fruits, veggies, whole grains, nuts/seeds and beans   Fiber is low in calories, high in phytonutrients and helps you stay full longer    4.  Eat S-L-O-W-L-Y   Take 20-30 minutes to eat each meal by taking small bites, chewing foods to applesauce consistency or 20-30 times before you swallow   Eating foods too fast can delay satiety/fullness signals and increase overeating     5.  Avoid Mindless Eating   Be present when you eat--take note of the " smell, taste and quality of your food   Make a list of alternative activities you could do to prevent boredom/stress eating  Go for a walk, call a friend, chew gum, paint your nails    6.  Keep a Journal   Writing down what you eat, how you feel and when you are active helps you identify new changes to work on from week to week         Look for ways to cut 100 calories from your current diet 2-3 times per day    7.  Drink 64 ounces of Non Calorie drinks between meals   Water   Zero calorie Propel , Vitamin Water , Crystal Light    Avoid regular soda pop    8.  Stop thinking about food choices as a  diet.    Instead, think of them as healthy, lifelong habits--an effort toward a new way of eating.   Weigh yourself once a week instead of everyday.     9.  Get enough sleep--at least 7 to 8 hours each night.    Lack of sleep is one reason that fat builds up around the waist.    10.  Exercise five to six times per week    Do a combination aerobic exercise (fast walking, biking, swimming) and strength training (Dumbbells, pushups, weight machines, resistance bands).   Start at 10 minutes per day and slowly work your way up to 30 minutes or more.  Taking the stairs instead of the elevator, park at the far end of the parking lot, pace while on the phone, take the dog for a walk.     [email or web address]

## 2022-08-23 NOTE — ASSESSMENT & PLAN NOTE
We discussed healthy habits to assist with weight loss. We discussed medication that may assist with weight loss. Reviewed phentermine and Saxenda. Risks/ benefits and possible side effects were discussed and questions were answered. Insurance dictates we must use phentermine unless contraindicated 1st before other AOM covered.  Will message transplant team to see if this is reason for contraindication.  If not will start phentermine.  Otherwise, will start Saxenda since pt has elevated HgA1C.

## 2022-08-25 ENCOUNTER — TELEPHONE (OUTPATIENT)
Dept: SURGERY | Facility: CLINIC | Age: 57
End: 2022-08-25

## 2022-08-25 DIAGNOSIS — E66.01 MORBID OBESITY (H): Primary | ICD-10-CM

## 2022-08-25 RX ORDER — PHENTERMINE HYDROCHLORIDE 15 MG/1
CAPSULE ORAL
Qty: 60 CAPSULE | Refills: 1 | Status: SHIPPED | OUTPATIENT
Start: 2022-08-25 | End: 2023-10-20 | Stop reason: SINTOL

## 2022-08-25 NOTE — TELEPHONE ENCOUNTER
----- Message from Max Haas MD sent at 8/24/2022  6:11 AM CDT -----  Regarding: RE: Pt attached now.  GLP-1 for Delilah  It doesn't cause an effect on the kidneys that I know about, so not sure why it would be an issue.  ----- Message -----  From: Paola Landa PA-C  Sent: 8/23/2022   6:28 PM CDT  To: Max Haas MD  Subject: RE: Pt attached now.  GLP-1 for Delilah         Guanakito Roberts,    One more question about Delilah...    Insurance dictates we must use phentermine unless contraindicated 1st before Saxenda is covered.  With her transplant are you comfortable with phentermine or would this be a contraindication?      Below are her last 6 BP:  BP Readings from Last 6 Encounters:  06/27/22 : 136/84  05/16/22 : 123/66  03/03/22 : (!) 158/92  02/29/20 : 119/83  07/05/18 : 160/79  06/19/17 : 117/85    If not, I will start with phentermine.     Thank you.      Paola     ----- Message -----  From: Max Haas MD  Sent: 8/22/2022  12:49 PM CDT  To: Will Crespo MD, Lauren Turner Bloch, [facility], #  Subject: RE: Pt attached now.  GLP-1 for Delilah         Dr. Crespo is no longer at this institution.  In general, we are okay with there medications following transplant.    Armando  ----- Message -----  From: Paola Landa PA-C  Sent: 8/22/2022  12:18 PM CDT  To: Will Crespo MD, Lauren Turner Bloch, [facility]  Subject: Pt attached now.  GLP-1 for Delilah             So sorry.  I forgot to attach patient to message.      Guanakito Crespo,    I see Delilah on on 8/23 for medical weight management.  She is prediabetic.  If I can get her approved for a GLP-1 agonist are you comfortable with her starting this in light of her kidney/pancreas transplant?  I see you out of the office, so I will also message Lauren Bloch, my [facility] pharmacist as well for her input.      Thank you.    Sincerely,    Paola Landa PA-C  ----- Message -----  From: Bloch, Lauren Turner, [facility]  Sent: 8/22/2022   10:21 AM CDT  To: Paola Landa PA-C    I dont see a patient attached.   ----- Message -----  From: Paola Landa PA-C  Sent: 8/21/2022   1:28 PM CDT  To: Will Crespo MD, Lauren Turner Bloch, [facility] Dr. Crespo,    I see Delilah on on 8/23 for medical weight management.  She is prediabetic.  If I can get her approved for a GLP-1 agonist are you comfortable with her starting this in light of her kidney/pancreas transplant?  I see you out of the office, so I will also message Lauren Bloch, my [place] pharmacist as well for her input.      Thank you.    Sincerely,    Paola Landa PA-C

## 2022-09-30 ENCOUNTER — TELEPHONE (OUTPATIENT)
Dept: NEPHROLOGY | Facility: CLINIC | Age: 57
End: 2022-09-30

## 2022-09-30 ENCOUNTER — TELEPHONE (OUTPATIENT)
Dept: HEMATOLOGY | Facility: CLINIC | Age: 57
End: 2022-09-30

## 2022-10-06 ENCOUNTER — VIRTUAL VISIT (OUTPATIENT)
Dept: NEPHROLOGY | Facility: CLINIC | Age: 57
End: 2022-10-06
Attending: INTERNAL MEDICINE
Payer: COMMERCIAL

## 2022-10-06 DIAGNOSIS — I82.403 RECURRENT ACUTE DEEP VEIN THROMBOSIS (DVT) OF BOTH LOWER EXTREMITIES (H): ICD-10-CM

## 2022-10-06 DIAGNOSIS — D84.9 IMMUNOSUPPRESSION (H): ICD-10-CM

## 2022-10-06 DIAGNOSIS — I26.99 OTHER ACUTE PULMONARY EMBOLISM WITHOUT ACUTE COR PULMONALE (H): ICD-10-CM

## 2022-10-06 DIAGNOSIS — D63.1 ANEMIA IN STAGE 2 CHRONIC KIDNEY DISEASE: ICD-10-CM

## 2022-10-06 DIAGNOSIS — Z94.0 KIDNEY REPLACED BY TRANSPLANT: Primary | ICD-10-CM

## 2022-10-06 DIAGNOSIS — Z48.298 AFTERCARE FOLLOWING ORGAN TRANSPLANT: ICD-10-CM

## 2022-10-06 DIAGNOSIS — Z94.83 PANCREAS REPLACED BY TRANSPLANT (H): ICD-10-CM

## 2022-10-06 DIAGNOSIS — N18.2 ANEMIA IN STAGE 2 CHRONIC KIDNEY DISEASE: ICD-10-CM

## 2022-10-06 PROBLEM — N39.3 STRESS INCONTINENCE: Status: RESOLVED | Noted: 2022-06-27 | Resolved: 2022-10-06

## 2022-10-06 PROBLEM — K35.30 ACUTE APPENDICITIS WITH LOCALIZED PERITONITIS, UNSPECIFIED WHETHER ABSCESS PRESENT, UNSPECIFIED WHETHER GANGRENE PRESENT, UNSPECIFIED WHETHER PERFORATION PRESENT: Status: RESOLVED | Noted: 2022-05-13 | Resolved: 2022-10-06

## 2022-10-06 PROCEDURE — 99214 OFFICE O/P EST MOD 30 MIN: CPT | Mod: GT | Performed by: INTERNAL MEDICINE

## 2022-10-06 PROCEDURE — G0463 HOSPITAL OUTPT CLINIC VISIT: HCPCS | Mod: PN,RTG | Performed by: INTERNAL MEDICINE

## 2022-10-06 RX ORDER — CETIRIZINE HYDROCHLORIDE 10 MG/1
5 TABLET ORAL DAILY PRN
COMMUNITY
Start: 2022-10-06

## 2022-10-06 NOTE — LETTER
10/6/2022       RE: Delilah Fountain  [address]  [place] [address]     Dear Colleague,    Thank you for referring your patient, Delilah Fountain, to the [facility] [place] [facility]. Please see a copy of my visit note below.    TRANSPLANT NEPHROLOGY CHRONIC POST TRANSPLANT VISIT    Assessment & Plan   # DDKT (SPK): Stable but no labs recently   - Baseline Creatinine:  ~ 0.6-0.75   - Proteinuria: Normal (<0.2 grams), recheck    - Date DSA Last Checked: Aug/2017      Latest DSA: No   - BK Viremia: Not checked recently due to time from transplant   - Kidney Tx Biopsy: No    -She will get labs within the next week at [facility]    -Labs q3 months    # Pancreas Tx (SPK):    - Pancreatic Exocrine Drainage: Enteric drained     - Blood glucose: Elevated blood glucose      On insulin: No   - HbA1c: Trend up due to obesity     Latest HbA1c: 6%, repeat    - Pancreatic enzymes: Stable   - Date DSA Last Checked: Aug/2017  Latest DSA: No   - Pancreas Tx Biopsy: No    # Immunosuppression: Tacrolimus immediate release (goal 5-8) and Azathioprine (dose 150 mg daily)   - Continue with intensive monitoring of immunosuppression for efficacy and toxicity.   - Changes: Not at this time    # Infection Prophylaxis:   - PJP: Sulfa/TMP (Bactrim) MWF    # Hypertension: Borderline control;  Goal BP: < 130/80   - Changes: Not at this time    # Anemia in Chronic Renal Disease: Hgb: Stable      LAURENT: No   - Iron studies: Not checked recently    # Mineral Bone Disorder:   - Secondary renal hyperparathyroidism; PTH level: Normal (15-65 pg/ml)        On treatment: None  - Vitamin D; level: Not checked recently, but was normal last check        On supplement: Yes  - Calcium; level: Low        On supplement: Yes  - Phosphorus; level: Not checked recently, but was normal last check        On supplement: No    # Electrolytes:   - Potassium;  level: Low normal        On supplement: No  - Magnesium; level: Not checked recently, but was normal last check        On supplement: No  - Bicarbonate; level: Normal        On supplement: No    # Acute appendicitis:    -Hospitalized 5/13-5/16/22. Underwent appendectomy 5/15/22.     # H/o DVT with recurrent PE:   -Has permanent IVC filter in place   -Restarted apixaban on POD 5 but then changed back to Rivaroxaban.    -Bleeding and clotting recommended changing to eliquis due to gastric absorbtion concerns with altered anatomy with Rivaroxaban.     # Obesity:   -BMI 44.34kg/m2, seeing bariatric surgery. Goal is to lose 5lbs prior to surgery.    -She was started on phentermine on 8/25/22   -She does not know for sure if she wants to go through with Bariatric surgery     # Skin Cancer Risk:    - Discussed sun protection and recommend regular follow up with Dermatology.    # Medical Compliance: No.  Evidence of infrequent transplant follow-up.  - Discussed importance of checking labs regularly as recommended, taking medications as prescribed and attending scheduled medical appointments.      # COVID-19 Virus Review: Discussed COVID-19 virus and the potential medical risks.  Reviewed preventative health recommendations, including wearing a mask where appropriate.  Recommended COVID vaccination should be up to date with either an initial vaccination or booster shot when appropriate.  Asked the patient to inform the transplant center if they are exposed or diagnosed with this virus.    # COVID Vaccination Up To Date: No, due for next dose. Recommend COVID booster now and then can get     # Transplant History:  Etiology of Kidney Failure: Diabetic nephropathy  Tx: SPK  Transplant: 7/12/2000 (Kidney / Pancreas)  Significant changes in immunosuppression: switched to AZA  Significant transplant-related complications: None    Transplant Office Phone Number: [phone]    Assessment and plan was discussed with the patient and  "she voiced her understanding and agreement.    Return visit: Return in about 1 year (around 10/6/2023).    Messi Dixon MD    Chief Complaint   Ms. Fountain is a 57 year old here for kidney transplant, pancreas transplant, immunosuppression management and follow-up after hospitalization.    History of Present Illness   The patient generally feels well. She states that she has been having issues finding words, about 6-7 years. She has \"tip of tongue\" phenomenon. She is trying to get tested with neuropsychology. She is working with her therapist who thinks that she has depression that is causing this issue. She is concerned about getting a job because she is concerned that she will lose it due to her memory issues.      The patient occasionally develops nausea. She denies vomiting, diarrhea, fever, chills, abdominal pain. She occasionally develops LE edema in her ankles, dysuria, frequency, hematuria, night sweats.     Home BP: Not checked    Problem List   Patient Active Problem List   Diagnosis     Hernia of abdominal cavity, with obstruction     Hernia of abdominal wall     Knee pain, bilateral     Hip pain     HTN (hypertension)     Morbid obesity (H)     Acute appendicitis with localized peritonitis, unspecified whether abscess present, unspecified whether gangrene present, unspecified whether perforation present     HERLINDA (obstructive sleep apnea)     Gastroesophageal reflux disease, unspecified whether esophagitis present     Anxiety     Recurrent major depressive disorder, remission status unspecified (H)     Stress incontinence     History of diabetes mellitus, type I     History of blood clots     Prediabetes       Allergies   Allergies   Allergen Reactions     Wellbutrin [Bupropion] Other (See Comments)     Pt. Reports that it made her more weird       Medications   Current Outpatient Medications   Medication Sig     azaTHIOprine (IMURAN) 50 MG tablet Take 3 tablets (150 mg) by mouth daily     calcium " carbonate (OS-RENATO 500 MG [place]. CA) 500 MG tablet Take 500 mg by mouth 2 times daily     cetirizine (ZYRTEC ALLERGY) 10 MG tablet Take 0.5 tablets (5 mg) by mouth daily as needed for allergies     cholecalciferol (VITAMIN D3) 1000 units (25 mcg) capsule Take 1 capsule by mouth daily.     order for DME AIRSENSE 10  10-15CM/H20  PILLOWS CHRISTIANSON FX FOR HER     phentermine (ADIPEX-P) 15 MG capsule Take 1 capsule in the morning. May increase to 2 tablets if tolerating but hunger not controlled or no weight loss after 2 weeks.     PROGRAF (BRAND) 1 MG capsule Take 2 capsules (2 mg) by mouth 2 times daily     rivaroxaban ANTICOAGULANT (XARELTO ANTICOAGULANT) 20 MG TABS tablet Take 1 tablet (20 mg) by mouth daily (with dinner)     sulfamethoxazole-trimethoprim (BACTRIM/SEPTRA) 400-80 MG per tablet Take 1 tablet by mouth Every Mon, Wed, Fri Morning     venlafaxine (EFFEXOR XR) 75 MG 24 hr capsule Take 150 mg by mouth 2 times daily     No current facility-administered medications for this visit.     Medications Discontinued During This Encounter   Medication Reason     Cetirizine HCl (ZYRTEC ALLERGY PO)        Physical Exam   Vital Signs: LMP 11/06/2015     GENERAL APPEARANCE: alert and no distress  HENT: no obvious abnormalities on appearance  RESP: breathing appears unremarkable with normal rate, no audible wheezing or cough and no apparent shortness of breath with conversation  MS: extremities normal - no gross deformities noted  SKIN: no apparent rash and normal skin tone  NEURO: speech is clear with no obvious neurological deficits  PSYCH: mentation appears normal and affect normal      Data     Renal Latest Ref Rng & Units 5/16/2022 5/15/2022 5/14/2022   Na 133 - 144 mmol/L 145(H) 144 143   Na (external) 134 - 144 mmol/L - - -   K 3.4 - 5.3 mmol/L 3.3(L) 3.5 3.6   K (external) 3.5 - 5.2 mmol/L - - -   Cl 94 - 109 mmol/L 114(H) 113(H) 112(H)   CO2 20 - 32 mmol/L 24 24 24   CO2 (external) 20 - 29 - - -   BUN 7 - 30 mg/dL 10  8 10   BUN (external) 6 - 24 mg/dL - - -   Cr 0.52 - 1.04 mg/dL 0.74 0.71 0.62   Cr (external) 0.57 - 1.00 mg/dL - - -   Glucose 70 - 99 mg/dL 160(H) 117(H) 126(H)   Glucose (external) 65 - 99 mg/dL - - -   Ca  8.5 - 10.1 mg/dL 8.4(L) 8.5 9.0   Ca (external) 8.7 - 10.2 mg/dL - - -   Mg 1.6 - 2.3 mg/dL - - -     Bone Health Latest Ref Rng & Units 8/25/2017 9/30/2013 3/24/2013   Phos 2.5 - 4.5 mg/dL - - 2.5   PTHi 12 - 72 pg/mL 62 - -   Vit D Def 20 - 75 ug/L 23 20(L) -     Heme Latest Ref Rng & Units 5/16/2022 5/15/2022 5/14/2022   WBC 4.0 - 11.0 10e3/uL 9.4 7.6 10.1   Hgb 11.7 - 15.7 g/dL 13.0 13.5 13.9   Plt 150 - 450 10e3/uL 247 237 266   ABSOLUTE NEUTROPHIL 1.6 - 8.3 10e9/L - - -   ABSOLUTE LYMPHOCYTES 0.8 - 5.3 10e9/L - - -   ABSOLUTE MONOCYTES 0.0 - 1.3 10e9/L - - -   ABSOLUTE EOSINOPHILS 0.0 - 0.7 10e9/L - - -   ABSOLUTE BASOPHILS 0.0 - 0.2 10e9/L - - -   ABS IMMATURE GRANULOCYTES 0 - 0.4 10e9/L - - -   ABSOLUTE NUCLEATED RBC - - - -     Liver Latest Ref Rng & Units 5/16/2022 5/15/2022 5/13/2022   AP 40 - 150 U/L 59 62 77   AP (external) 39 - 117 IU/L - - -   TBili 0.2 - 1.3 mg/dL 0.5 0.4 0.4   TBili (external) 0.0 - 1.2 mg/dL - - -   ALT 0 - 50 U/L 15 14 16   ALT (external) 0 - 32 IU/L - - -   AST 0 - 45 U/L 15 10 12   AST (external) 0 - 40 IU/L - - -   Tot Protein 6.8 - 8.8 g/dL 6.8 7.1 7.9   Tot Protein (external) 6.0 - 8.5 g/dL - - -   Albumin 3.4 - 5.0 g/dL 2.5(L) 2.7(L) 3.1(L)   Albumin (external) 3.8 - 4.9 g/dL - - -     Pancreas Latest Ref Rng & Units 5/13/2022 3/2/2022 9/9/2020   A1C 0.0 - 5.6 % - 6.0(H) -   Amylase 30 - 110 U/L - 57 -   Amylase (external) 31 - 110 u/L - - 45   Lipase 73 - 393 U/L 62(L) 94 -   Lipase (external) 31 - 110 u/L - - 45     Iron studies Latest Ref Rng & Units 9/30/2013   Iron 35 - 180 ug/dL 36   Iron sat 15 - 46 % 15   Ferritin 10 - 300 ng/mL 67     [facility] Txp Virology Latest Ref Rng & Units 6/25/2018 8/25/2017 6/30/2016   CVM DNA Quant - - Plasma, EDTA anticoagulant -   CMV  Quant <100 Copies/mL - - -   CMV QT Log <2.0 Log copies/mL - - -   CMV QUANT IU/ML CMVND:CMV DNA Not Detected [IU]/mL - CMV DNA Not Detected -   LOG IU/ML OF CMVQNT <2.1 [Log:IU]/mL - Not Calculated -   BK Spec - Plasma Whole Blood Plasma   BK Res BKNEG:BK Virus DNA Not Detected copies/mL BK Virus DNA Not Detected BK Virus DNA Not Detected BK Virus DNA Not Detected   BK Log <2.7 Log copies/mL Not Calculated Not Calculated Not Calculated   The Real-Time quantitative BK Virus assay was developed and its performance   characteristics determined by the [facility] Laboratory at   the [facility] in [place], [place]. The   primers and probes for each analyte are Analyte Specific Reagents (ASRs)   manufactured by [employer].   ASRs are used in many laboratory tests necessary for standard medical care and   generally do not require U.S. Food and Drug Administration approval. The FDA   has determined that such clearance or approval is not necessary.   This test is used for clinical purposes. It should not be regarded as   investigational or for research. This laboratory is certified under the   Clinical Laboratory Improvement Amendments of 1988 (CLIA-88) as qualified to   perform high complexity clinical laboratory testing.     EBV DNA COPIES/ML EBVNEG:EBV DNA Not Detected [Copies]/mL - EBV DNA Not Detected -   EBV DNA LOG OF COPIES <2.7 [Log:copies]/mL - Not Calculated -        Recent Labs   Lab Test 11/18/16  0945 08/25/17  1109 06/25/18  1116 03/02/22  1414   DOSTAC 2,130 2,230 2,300  --    TACROL 5.6 5.3 6.6 5.7         Again, thank you for allowing me to participate in the care of your patient.      Sincerely,    Messi Dixon MD

## 2022-10-06 NOTE — PATIENT INSTRUCTIONS
Patient Recommendations:  - Recommend COVID booster now and 2 weeks later getting EVUSHELD. Please call to make a nurse visit for EVUSHELD.     Transplant Patient Information  Your Post Transplant Coordinator is: Alem Diaz  For non urgent items, we encourage you to contact your coordinator/care team online via [place]  You and your care team can also contact your transplant coordinator Monday - Friday, 8am - 5pm at [phone] (Option 2 to reach the coordinator or Option 4 to schedule an appointment).  After hours for urgent matters, please call [facility] at [phone].

## 2022-10-06 NOTE — PROGRESS NOTES
TRANSPLANT NEPHROLOGY CHRONIC POST TRANSPLANT VISIT    Assessment & Plan   # DDKT (SPK): Stable but no labs recently   - Baseline Creatinine:  ~ 0.6-0.75   - Proteinuria: Normal (<0.2 grams), recheck    - Date DSA Last Checked: Aug/2017      Latest DSA: No   - BK Viremia: Not checked recently due to time from transplant   - Kidney Tx Biopsy: No    -She will get labs within the next week at [facility]    -Labs q3 months    # Pancreas Tx (SPK):    - Pancreatic Exocrine Drainage: Enteric drained     - Blood glucose: Elevated blood glucose      On insulin: No   - HbA1c: Trend up due to obesity     Latest HbA1c: 6%, repeat    - Pancreatic enzymes: Stable   - Date DSA Last Checked: Aug/2017  Latest DSA: No   - Pancreas Tx Biopsy: No    # Immunosuppression: Tacrolimus immediate release (goal 5-8) and Azathioprine (dose 150 mg daily)   - Continue with intensive monitoring of immunosuppression for efficacy and toxicity.   - Changes: Not at this time    # Infection Prophylaxis:   - PJP: Sulfa/TMP (Bactrim) MWF    # Hypertension: Borderline control;  Goal BP: < 130/80   - Changes: Not at this time    # Anemia in Chronic Renal Disease: Hgb: Stable      LAURENT: No   - Iron studies: Not checked recently    # Mineral Bone Disorder:   - Secondary renal hyperparathyroidism; PTH level: Normal (15-65 pg/ml)        On treatment: None  - Vitamin D; level: Not checked recently, but was normal last check        On supplement: Yes  - Calcium; level: Low        On supplement: Yes  - Phosphorus; level: Not checked recently, but was normal last check        On supplement: No    # Electrolytes:   - Potassium; level: Low normal        On supplement: No  - Magnesium; level: Not checked recently, but was normal last check        On supplement: No  - Bicarbonate; level: Normal        On supplement: No    # Acute appendicitis:    -Hospitalized 5/13-5/16/22. Underwent appendectomy 5/15/22.     # H/o DVT with recurrent PE:   -Has permanent IVC  filter in place   -Restarted apixaban on POD 5 but then changed back to Rivaroxaban.    -Bleeding and clotting recommended changing to eliquis due to gastric absorbtion concerns with altered anatomy with Rivaroxaban.     # Obesity:   -BMI 44.34kg/m2, seeing bariatric surgery. Goal is to lose 5lbs prior to surgery.    -She was started on phentermine on 8/25/22   -She does not know for sure if she wants to go through with Bariatric surgery     # Skin Cancer Risk:    - Discussed sun protection and recommend regular follow up with Dermatology.    # Medical Compliance: No.  Evidence of infrequent transplant follow-up.  - Discussed importance of checking labs regularly as recommended, taking medications as prescribed and attending scheduled medical appointments.      # COVID-19 Virus Review: Discussed COVID-19 virus and the potential medical risks.  Reviewed preventative health recommendations, including wearing a mask where appropriate.  Recommended COVID vaccination should be up to date with either an initial vaccination or booster shot when appropriate.  Asked the patient to inform the transplant center if they are exposed or diagnosed with this virus.    # COVID Vaccination Up To Date: No, due for next dose. Recommend COVID booster now and then can get     # Transplant History:  Etiology of Kidney Failure: Diabetic nephropathy  Tx: SPK  Transplant: 7/12/2000 (Kidney / Pancreas)  Significant changes in immunosuppression: switched to AZA  Significant transplant-related complications: None    Transplant Office Phone Number: [phone]    Assessment and plan was discussed with the patient and she voiced her understanding and agreement.    Return visit: Return in about 1 year (around 10/6/2023).    Messi Dixon MD    Chief Complaint   Ms. Fountain is a 57 year old here for kidney transplant, pancreas transplant, immunosuppression management and follow-up after hospitalization.    History of Present Illness   The  "patient generally feels well. She states that she has been having issues finding words, about 6-7 years. She has \"tip of tongue\" phenomenon. She is trying to get tested with neuropsychology. She is working with her therapist who thinks that she has depression that is causing this issue. She is concerned about getting a job because she is concerned that she will lose it due to her memory issues.      The patient occasionally develops nausea. She denies vomiting, diarrhea, fever, chills, abdominal pain. She occasionally develops LE edema in her ankles, dysuria, frequency, hematuria, night sweats.     Home BP: Not checked    Problem List   Patient Active Problem List   Diagnosis     Hernia of abdominal cavity, with obstruction     Hernia of abdominal wall     Knee pain, bilateral     Hip pain     HTN (hypertension)     Morbid obesity (H)     Acute appendicitis with localized peritonitis, unspecified whether abscess present, unspecified whether gangrene present, unspecified whether perforation present     HERLINDA (obstructive sleep apnea)     Gastroesophageal reflux disease, unspecified whether esophagitis present     Anxiety     Recurrent major depressive disorder, remission status unspecified (H)     Stress incontinence     History of diabetes mellitus, type I     History of blood clots     Prediabetes       Allergies   Allergies   Allergen Reactions     Wellbutrin [Bupropion] Other (See Comments)     Pt. Reports that it made her more weird       Medications   Current Outpatient Medications   Medication Sig     azaTHIOprine (IMURAN) 50 MG tablet Take 3 tablets (150 mg) by mouth daily     calcium carbonate (OS-RENATO 500 MG [place]. [place]) 500 MG tablet Take 500 mg by mouth 2 times daily     cetirizine (ZYRTEC ALLERGY) 10 MG tablet Take 0.5 tablets (5 mg) by mouth daily as needed for allergies     cholecalciferol (VITAMIN D3) 1000 units (25 mcg) capsule Take 1 capsule by mouth daily.     order for DME AIRSENSE " 10  10-15CM/H20  PILLOWS CHRISTIANSON FX FOR HER     phentermine (ADIPEX-P) 15 MG capsule Take 1 capsule in the morning. May increase to 2 tablets if tolerating but hunger not controlled or no weight loss after 2 weeks.     PROGRAF (BRAND) 1 MG capsule Take 2 capsules (2 mg) by mouth 2 times daily     rivaroxaban ANTICOAGULANT (XARELTO ANTICOAGULANT) 20 MG TABS tablet Take 1 tablet (20 mg) by mouth daily (with dinner)     sulfamethoxazole-trimethoprim (BACTRIM/SEPTRA) 400-80 MG per tablet Take 1 tablet by mouth Every Mon, Wed, Fri Morning     venlafaxine (EFFEXOR XR) 75 MG 24 hr capsule Take 150 mg by mouth 2 times daily     No current facility-administered medications for this visit.     Medications Discontinued During This Encounter   Medication Reason     Cetirizine HCl (ZYRTEC ALLERGY PO)        Physical Exam   Vital Signs: [facility] 11/06/2015     GENERAL APPEARANCE: alert and no distress  HENT: no obvious abnormalities on appearance  RESP: breathing appears unremarkable with normal rate, no audible wheezing or cough and no apparent shortness of breath with conversation  MS: extremities normal - no gross deformities noted  SKIN: no apparent rash and normal skin tone  NEURO: speech is clear with no obvious neurological deficits  PSYCH: mentation appears normal and affect normal      Data     Renal Latest Ref Rng & Units 5/16/2022 5/15/2022 5/14/2022   Na 133 - 144 mmol/L 145(H) 144 143   Na (external) 134 - 144 mmol/L - - -   K 3.4 - 5.3 mmol/L 3.3(L) 3.5 3.6   K (external) 3.5 - 5.2 mmol/L - - -   Cl 94 - 109 mmol/L 114(H) 113(H) 112(H)   CO2 20 - 32 mmol/L 24 24 24   CO2 (external) 20 - 29 - - -   BUN 7 - 30 mg/dL 10 8 10   BUN (external) 6 - 24 mg/dL - - -   Cr 0.52 - 1.04 mg/dL 0.74 0.71 0.62   Cr (external) 0.57 - 1.00 mg/dL - - -   Glucose 70 - 99 mg/dL 160(H) 117(H) 126(H)   Glucose (external) 65 - 99 mg/dL - - -   Ca  8.5 - 10.1 mg/dL 8.4(L) 8.5 9.0   Ca (external) 8.7 - 10.2 mg/dL - - -   Mg 1.6 - 2.3 mg/dL - - -      Bone Health Latest Ref Rng & Units 8/25/2017 9/30/2013 3/24/2013   Phos 2.5 - 4.5 mg/dL - - 2.5   PTHi 12 - 72 pg/mL 62 - -   Vit D Def 20 - 75 ug/L 23 20(L) -     Heme Latest Ref Rng & Units 5/16/2022 5/15/2022 5/14/2022   WBC 4.0 - 11.0 10e3/uL 9.4 7.6 10.1   Hgb 11.7 - 15.7 g/dL 13.0 13.5 13.9   Plt 150 - 450 10e3/uL 247 237 266   ABSOLUTE NEUTROPHIL 1.6 - 8.3 10e9/L - - -   ABSOLUTE LYMPHOCYTES 0.8 - 5.3 10e9/L - - -   ABSOLUTE MONOCYTES 0.0 - 1.3 10e9/L - - -   ABSOLUTE EOSINOPHILS 0.0 - 0.7 10e9/L - - -   ABSOLUTE BASOPHILS 0.0 - 0.2 10e9/L - - -   ABS IMMATURE GRANULOCYTES 0 - 0.4 10e9/L - - -   ABSOLUTE NUCLEATED RBC - - - -     Liver Latest Ref Rng & Units 5/16/2022 5/15/2022 5/13/2022   AP 40 - 150 U/L 59 62 77   AP (external) 39 - 117 IU/L - - -   TBili 0.2 - 1.3 mg/dL 0.5 0.4 0.4   TBili (external) 0.0 - 1.2 mg/dL - - -   ALT 0 - 50 U/L 15 14 16   ALT (external) 0 - 32 IU/L - - -   AST 0 - 45 U/L 15 10 12   AST (external) 0 - 40 IU/L - - -   Tot Protein 6.8 - 8.8 g/dL 6.8 7.1 7.9   Tot Protein (external) 6.0 - 8.5 g/dL - - -   Albumin 3.4 - 5.0 g/dL 2.5(L) 2.7(L) 3.1(L)   Albumin (external) 3.8 - 4.9 g/dL - - -     Pancreas Latest Ref Rng & Units 5/13/2022 3/2/2022 9/9/2020   A1C 0.0 - 5.6 % - 6.0(H) -   Amylase 30 - 110 U/L - 57 -   Amylase (external) 31 - 110 u/L - - 45   Lipase 73 - 393 U/L 62(L) 94 -   Lipase (external) 31 - 110 u/L - - 45     Iron studies Latest Ref Rng & Units 9/30/2013   Iron 35 - 180 ug/dL 36   Iron sat 15 - 46 % 15   Ferritin 10 - 300 ng/mL 67     [facility] Txp Virology Latest Ref Rng & Units 6/25/2018 8/25/2017 6/30/2016   CVM DNA Quant - - Plasma, EDTA anticoagulant -   CMV Quant <100 Copies/mL - - -   CMV QT Log <2.0 Log copies/mL - - -   CMV QUANT IU/ML CMVND:CMV DNA Not Detected [IU]/mL - CMV DNA Not Detected -   LOG IU/ML OF CMVQNT <2.1 [Log:IU]/mL - Not Calculated -   BK Spec - Plasma Whole Blood Plasma   BK Res BKNEG:BK Virus DNA Not Detected copies/mL BK Virus DNA Not  Detected BK Virus DNA Not Detected BK Virus DNA Not Detected   BK Log <2.7 Log copies/mL Not Calculated Not Calculated Not Calculated   The Real-Time quantitative BK Virus assay was developed and its performance   characteristics determined by the [facility] Diagnostic Laboratory at   the [facility] in [place], [place]. The   primers and probes for each analyte are Analyte Specific Reagents (ASRs)   manufactured by [employer].   ASRs are used in many laboratory tests necessary for standard medical care and   generally do not require U.S. Food and Drug Administration approval. The FDA   has determined that such clearance or approval is not necessary.   This test is used for clinical purposes. It should not be regarded as   investigational or for research. This laboratory is certified under the   Clinical Laboratory Improvement Amendments of 1988 (CLIA-88) as qualified to   perform high complexity clinical laboratory testing.     EBV DNA COPIES/ML EBVNEG:EBV DNA Not Detected [Copies]/mL - EBV DNA Not Detected -   EBV DNA LOG OF COPIES <2.7 [Log:copies]/mL - Not Calculated -        Recent Labs   Lab Test 11/18/16  0945 08/25/17  1109 06/25/18  1116 03/02/22  1414   DOSTAC 2,130 2,230 2,300  --    TACROL 5.6 5.3 6.6 5.7

## 2022-10-06 NOTE — PROGRESS NOTES
Delilah is a 57 year old who is being evaluated via a billable video visit.      How would you like to obtain your AVS? MyChart  If the video visit is dropped, the invitation should be resent by: Text to cell phone: [phone]  Will anyone else be joining your video visit? No        Video-Visit Details    Video Start Time: 4:13 PM    Type of service:  Video Visit    Video End Time:4:23 PM, followed by Galo 4:26pm - 4:51pm    Originating Location (pt. Location): Home    Distant Location (provider location):  [facility] [place]     Platform used for Video Visit: Laser[employer]

## 2022-10-07 DIAGNOSIS — Z94.83 PANCREAS REPLACED BY TRANSPLANT (H): Primary | ICD-10-CM

## 2022-10-07 DIAGNOSIS — Z94.0 KIDNEY TRANSPLANTED: ICD-10-CM

## 2022-10-07 DIAGNOSIS — Z48.298 AFTERCARE FOLLOWING ORGAN TRANSPLANT: ICD-10-CM

## 2022-10-07 NOTE — PROGRESS NOTES
Message  Received: Yesterday  Messi Dixon MD [place], Alem Aaron RN    Hi, she should get q3 month labs but will get labs next week at [facility]. With those labs please obtain UPCR, HbA1c.     Thanks,   Messi     OUTCOME: RNCC placed orders in Epic.    Alem Diaz RN, BSN  Solid Organ Transplant, Post Kidney and Pancreas  Transplant Care Coordinator  [phone]

## 2022-10-10 ENCOUNTER — HEALTH MAINTENANCE LETTER (OUTPATIENT)
Age: 57
End: 2022-10-10

## 2022-11-08 DIAGNOSIS — Z94.0 KIDNEY TRANSPLANTED: Primary | ICD-10-CM

## 2022-11-08 RX ORDER — TACROLIMUS 1 MG/1
2 CAPSULE, GELATIN COATED ORAL 2 TIMES DAILY
Qty: 120 CAPSULE | Refills: 0 | Status: SHIPPED | OUTPATIENT
Start: 2022-11-08 | End: 2022-11-30

## 2022-11-30 DIAGNOSIS — Z94.0 KIDNEY TRANSPLANTED: ICD-10-CM

## 2022-11-30 RX ORDER — TACROLIMUS 1 MG/1
2 CAPSULE, GELATIN COATED ORAL 2 TIMES DAILY
Qty: 120 CAPSULE | Refills: 0 | Status: SHIPPED | OUTPATIENT
Start: 2022-11-30 | End: 2023-01-06

## 2023-01-06 DIAGNOSIS — Z94.0 KIDNEY TRANSPLANTED: ICD-10-CM

## 2023-01-06 RX ORDER — TACROLIMUS 1 MG/1
2 CAPSULE, GELATIN COATED ORAL 2 TIMES DAILY
Qty: 120 CAPSULE | Refills: 0 | Status: SHIPPED | OUTPATIENT
Start: 2023-01-06 | End: 2023-02-07

## 2023-01-11 ENCOUNTER — TELEPHONE (OUTPATIENT)
Dept: HEMATOLOGY | Facility: CLINIC | Age: 58
End: 2023-01-11

## 2023-01-11 DIAGNOSIS — I26.99 OTHER ACUTE PULMONARY EMBOLISM WITHOUT ACUTE COR PULMONALE (H): ICD-10-CM

## 2023-01-16 DIAGNOSIS — I26.99 OTHER ACUTE PULMONARY EMBOLISM WITHOUT ACUTE COR PULMONALE (H): ICD-10-CM

## 2023-01-16 NOTE — PROGRESS NOTES
Fax from [facility] requesting refill of Xarelto 20mg tabs.    Patient last seen in June 2022 and due for follow up December 2023. Out reach attempts to get patient scheduled occurred last week. 3 month supply, zero refills.    Nena BENDERN, RN, PHN   [facility]   Office: [phone]  Fax: [phone]

## 2023-01-17 ENCOUNTER — TELEPHONE (OUTPATIENT)
Dept: HEMATOLOGY | Facility: CLINIC | Age: 58
End: 2023-01-17
Payer: COMMERCIAL

## 2023-02-07 DIAGNOSIS — Z94.0 KIDNEY TRANSPLANTED: Primary | ICD-10-CM

## 2023-02-07 RX ORDER — TACROLIMUS 1 MG/1
2 CAPSULE, GELATIN COATED ORAL 2 TIMES DAILY
Qty: 120 CAPSULE | Refills: 0 | Status: SHIPPED | OUTPATIENT
Start: 2023-02-07 | End: 2023-03-06

## 2023-03-06 DIAGNOSIS — Z94.0 KIDNEY TRANSPLANTED: Primary | ICD-10-CM

## 2023-03-06 RX ORDER — TACROLIMUS 1 MG/1
2 CAPSULE, GELATIN COATED ORAL 2 TIMES DAILY
Qty: 120 CAPSULE | Refills: 0 | Status: SHIPPED | OUTPATIENT
Start: 2023-03-06 | End: 2023-04-07

## 2023-03-20 LAB
ALT SERPL-CCNC: 10 IU/L (ref 0–32)
AST SERPL-CCNC: 12 IU/L (ref 0–40)
CHOLESTEROL (EXTERNAL): 185 MG/DL (ref 100–199)
CREATININE (EXTERNAL): 0.55 MG/DL (ref 0.57–1)
GFR ESTIMATED (EXTERNAL): 106 ML/MIN/1.73M2
GLUCOSE (EXTERNAL): 119 MG/DL (ref 70–99)
HBA1C MFR BLD: 6.2 % (ref 4.8–5.6)
HDLC SERPL-MCNC: 46 MG/DL
LDL CHOLESTEROL CALCULATED (EXTERNAL): 116 MG/DL (ref 0–99)
POTASSIUM (EXTERNAL): 4.1 MMOL/L (ref 3.5–5.2)
TRIGLYCERIDES (EXTERNAL): 129 MG/DL (ref 0–149)

## 2023-03-25 ENCOUNTER — HEALTH MAINTENANCE LETTER (OUTPATIENT)
Age: 58
End: 2023-03-25

## 2023-04-07 DIAGNOSIS — Z94.0 KIDNEY TRANSPLANTED: ICD-10-CM

## 2023-04-07 DIAGNOSIS — I26.99 OTHER ACUTE PULMONARY EMBOLISM WITHOUT ACUTE COR PULMONALE (H): ICD-10-CM

## 2023-04-07 RX ORDER — TACROLIMUS 1 MG/1
2 CAPSULE, GELATIN COATED ORAL 2 TIMES DAILY
Qty: 120 CAPSULE | Refills: 0 | Status: SHIPPED | OUTPATIENT
Start: 2023-04-07 | End: 2023-05-26

## 2023-04-07 NOTE — TELEPHONE ENCOUNTER
RNCC placed call to Delilah to request lab collection for future refills of Prograf. She states she had labs done 2 weeks ago at [facility], Ph# [phone]. Call to clinic, request that lab results from 3/20/23 be faxed over. Tacrolimus level verbally reported as 4.7 (near goal 5-8). Will review timing when fax received; patient should obtain monthly labs in 2 weeks. Confirmed dose taking with patient is Prograf 2 mg BID; 30 day supply sent.     Alem Diaz RN, BSN  Solid Organ Transplant, Post Kidney and Pancreas  Transplant Care Coordinator  [phone]

## 2023-04-10 DIAGNOSIS — I26.99 OTHER ACUTE PULMONARY EMBOLISM WITHOUT ACUTE COR PULMONALE (H): ICD-10-CM

## 2023-04-11 DIAGNOSIS — Z94.83 PANCREAS REPLACED BY TRANSPLANT (H): ICD-10-CM

## 2023-04-11 DIAGNOSIS — Z94.0 KIDNEY TRANSPLANTED: Primary | ICD-10-CM

## 2023-04-11 RX ORDER — AZATHIOPRINE 50 MG/1
150 TABLET ORAL DAILY
Qty: 90 TABLET | Refills: 11 | Status: SHIPPED | OUTPATIENT
Start: 2023-04-11 | End: 2024-03-26

## 2023-05-26 DIAGNOSIS — Z94.0 KIDNEY TRANSPLANTED: ICD-10-CM

## 2023-05-26 RX ORDER — TACROLIMUS 1 MG/1
2 CAPSULE, GELATIN COATED ORAL 2 TIMES DAILY
Qty: 120 CAPSULE | Refills: 0 | Status: SHIPPED | OUTPATIENT
Start: 2023-05-26 | End: 2023-06-21

## 2023-06-06 ENCOUNTER — TRANSFERRED RECORDS (OUTPATIENT)
Dept: HEALTH INFORMATION MANAGEMENT | Facility: CLINIC | Age: 58
End: 2023-06-06
Payer: COMMERCIAL

## 2023-06-21 DIAGNOSIS — Z94.0 KIDNEY TRANSPLANTED: Primary | ICD-10-CM

## 2023-06-21 RX ORDER — TACROLIMUS 1 MG/1
2 CAPSULE, GELATIN COATED ORAL 2 TIMES DAILY
Qty: 120 CAPSULE | Refills: 0 | Status: SHIPPED | OUTPATIENT
Start: 2023-06-21 | End: 2023-07-17

## 2023-06-22 ENCOUNTER — VIRTUAL VISIT (OUTPATIENT)
Dept: HEMATOLOGY | Facility: CLINIC | Age: 58
End: 2023-06-22
Attending: INTERNAL MEDICINE
Payer: COMMERCIAL

## 2023-06-22 DIAGNOSIS — Z86.711 HISTORY OF PULMONARY EMBOLISM: Primary | ICD-10-CM

## 2023-06-22 PROCEDURE — 99214 OFFICE O/P EST MOD 30 MIN: CPT | Mod: VID | Performed by: INTERNAL MEDICINE

## 2023-06-22 NOTE — PROGRESS NOTES
[facility]  [address] [address]  Main: [phone], Fax: [phone]    Patient: Delilah Fountain  MRN: [id number]  : 1965  DONALDO: 2023     Reason for visit:  Follow up recurrent DVT    Assessment:  Delilah Fountain is a 57 year old woman with history of type 1 diabetes s/p pancreas/kidney transplant with postoperative DVT in  treated initially with LMWH and IVC filter (permanent) and unprovoked bilateral PE in 2020 who was initially treated with warfarin but eventually changed to Xarelto with plan for indefinite secondary prophylaxis.    Plan:  1. Today we discussed the risks and benefits of continued secondary prophylaxis with rivaroxaban versus changing to apixaban and after this discussion she decided she would like to make this change.  2. Discontinued rivaroxaban  3. Started apixaban 5 mg twice a day  4. Tentative bariatric surgery AC plan:   Hold anticoagulation 48 hours prior to procedure.  Would use therapeutic Lovenox for 2 weeks after bariatric surgery then transition to Eliquis 5mg twice daily afterwards.   5. Patient instructed to contact the clinic should they require any surgical procedures for perioperative planning.   6. Return to clinic in 1 year    30 minutes spent by me on the date of the encounter doing chart review, review of test results, interpretation of tests, patient visit and documentation      Video-Visit Details:    Type of service:  Video Visit  Video Start Time: 317 PM  Video End Time (time video stopped): 341 PM    Originating Location (pt. Location): Home  Distant Location (provider location):  [facility]     Mode of Communication:  Video Conference via Veset    --------------------------------------------------  Clinical History Summary:  Delilah Fountain is a 57 year old woman with past medical history significant for  type 1 DM s/p pancreas/kidney transplant with postoperative DVT in 2000 treated with LMWH and permanent IVC filter. In February 2020, she had unprovoked bilateral pulmonary embolism and was treated with LMWH and warfarin initially due to concern about drug interactions and morbid obesity. She eventually was changed to Xarelto 20mg once daily. Due to shortness of breath symptoms reported at her July 2021 visit, a peak Rivaroxaban level was checked to ensure adequate absorption. Unfortunately, she had missed her dose the morning of the lab testing. She has not completed this yet.     Interim History:  Today, Delilah Fountain notes she is doing well.  He reports no acute issues with bleeding or melena.  Cost Xarelto is not a concern for her.  She notes no new symptoms concerning for DVT or pulmonary embolism.  She is concerned about taking Xarelto with food.  This causes her some significant stress about the appropriate time to take the medication.  Today we discussed alternatives such as apixaban which has not required to take with food but would require a change to twice a day dosing.  She was considering weight loss surgery but is now more interested in medical management so she continues to follow with the weight management clinic.    Exam:   Exam:  There is no height or weight on file to calculate BMI.   Constitutional: Appears well, no distress  Eyes: no discharge, injection or icterus  Respiratory: no cough or labored breathing  Skin: no rashes or petechiae  Neurological: no deficits appreciated, speech is fluent  Psych: affect is normal    Labs:  Creatinine   Date Value Ref Range Status   05/16/2022 0.74 0.52 - 1.04 mg/dL Final   09/09/2020 0.66 0.57 - 1.00 mg/dL Final     Imaging:  none

## 2023-07-14 DIAGNOSIS — Z94.0 KIDNEY TRANSPLANTED: ICD-10-CM

## 2023-07-14 DIAGNOSIS — Z94.83 PANCREAS REPLACED BY TRANSPLANT (H): Primary | ICD-10-CM

## 2023-07-17 RX ORDER — TACROLIMUS 1 MG/1
2 CAPSULE, GELATIN COATED ORAL 2 TIMES DAILY
Qty: 120 CAPSULE | Refills: 11 | Status: SHIPPED | OUTPATIENT
Start: 2023-07-17 | End: 2024-07-23

## 2023-08-22 ENCOUNTER — HOSPITAL ENCOUNTER (OUTPATIENT)
Dept: MAMMOGRAPHY | Facility: CLINIC | Age: 58
Discharge: HOME | End: 2023-08-22
Attending: FAMILY MEDICINE | Admitting: FAMILY MEDICINE
Payer: COMMERCIAL

## 2023-08-22 DIAGNOSIS — Z12.31 VISIT FOR SCREENING MAMMOGRAM: ICD-10-CM

## 2023-08-22 PROCEDURE — 77067 SCR MAMMO BI INCL CAD: CPT

## 2023-09-06 ENCOUNTER — TELEPHONE (OUTPATIENT)
Dept: NEPHROLOGY | Facility: CLINIC | Age: 58
End: 2023-09-06
Payer: COMMERCIAL

## 2023-09-06 NOTE — TELEPHONE ENCOUNTER
[facility] Prior Authorization Team   Phone: [phone]  Fax: [phone]    PA Initiation    Medication: TACROLIMUS (GENERIC EQUIV) 1 MG PO CAPS  Insurance Company: [employer] - Phone [phone] Fax [phone]  Pharmacy Filling the Rx: [facility] - [place] [address]  Filling Pharmacy Phone: [phone]  Filling Pharmacy Fax: [phone]  Start Date: 9/6/2023

## 2023-09-08 NOTE — TELEPHONE ENCOUNTER
Prior Authorization Approval    Medication: TACROLIMUS (GENERIC EQUIV) 1 MG PO CAPS  Authorization Effective Date: 8/6/2023  Authorization Expiration Date: 9/6/2024  Approved Dose/Quantity: 120 for 30  days  Reference #: [id number]   Insurance Company: [employer] - Phone [phone] Fax [phone]  Expected CoPay: $3     CoPay Card Available:      Financial Assistance Needed: No  Which Pharmacy is filling the prescription: [facility] - [place], [place] - [address]  Pharmacy Notified: Yes  Patient Notified: Yes

## 2023-10-19 ENCOUNTER — VIRTUAL VISIT (OUTPATIENT)
Dept: TRANSPLANT | Facility: CLINIC | Age: 58
End: 2023-10-19
Attending: INTERNAL MEDICINE
Payer: COMMERCIAL

## 2023-10-19 DIAGNOSIS — Z94.0 HTN, KIDNEY TRANSPLANT RELATED: ICD-10-CM

## 2023-10-19 DIAGNOSIS — I82.403 RECURRENT ACUTE DEEP VEIN THROMBOSIS (DVT) OF BOTH LOWER EXTREMITIES (H): ICD-10-CM

## 2023-10-19 DIAGNOSIS — F33.9 RECURRENT MAJOR DEPRESSIVE DISORDER, REMISSION STATUS UNSPECIFIED (H): Primary | ICD-10-CM

## 2023-10-19 DIAGNOSIS — E66.01 MORBID OBESITY (H): ICD-10-CM

## 2023-10-19 DIAGNOSIS — G47.33 OSA (OBSTRUCTIVE SLEEP APNEA): ICD-10-CM

## 2023-10-19 DIAGNOSIS — D84.9 IMMUNOSUPPRESSION (H): ICD-10-CM

## 2023-10-19 DIAGNOSIS — R73.03 PREDIABETES: ICD-10-CM

## 2023-10-19 DIAGNOSIS — Z94.0 KIDNEY TRANSPLANTED: ICD-10-CM

## 2023-10-19 DIAGNOSIS — Z94.83 PANCREAS REPLACED BY TRANSPLANT (H): ICD-10-CM

## 2023-10-19 DIAGNOSIS — I15.1 HTN, KIDNEY TRANSPLANT RELATED: ICD-10-CM

## 2023-10-19 DIAGNOSIS — Z79.01 ANTICOAGULATED: ICD-10-CM

## 2023-10-19 PROCEDURE — 99214 OFFICE O/P EST MOD 30 MIN: CPT | Mod: VID | Performed by: INTERNAL MEDICINE

## 2023-10-19 NOTE — PROGRESS NOTES
Delilah is a 58 year old who is being evaluated via a billable video visit.      How would you like to obtain your AVS? MyChart  If the video visit is dropped, the invitation should be resent by: Text to cell phone: [phone]  Will anyone else be joining your video visit? No    Video-Visit Details    Type of service:  Video Visit   Video Start Time: 11:04 AM  Video End Time:11:27 AM    Originating Location (pt. Location): Home    Distant Location (provider location):  On-site  Platform used for Video Visit: [facility]     TRANSPLANT NEPHROLOGY CHRONIC POST TRANSPLANT VISIT    Assessment & Plan   # DDKT (SPK): Stable but no labs recently   - Baseline Creatinine:  ~ 0.6-0.75   - Proteinuria: Normal (<0.2 grams), recheck    - Date DSA Last Checked: Aug/2017      Latest DSA: No   - BK Viremia: Not checked recently due to time from transplant   - Kidney Tx Biopsy: No    -She will get labs within the next week at [facility]    -Labs q3 months    # Pancreas Tx (SPK):    - Pancreatic Exocrine Drainage: Enteric drained     - Blood glucose: Elevated blood glucose      On insulin: No   - HbA1c: Trend up due to obesity     Latest HbA1c: 6.2%, repeat    - Pancreatic enzymes: Stable   - Date DSA Last Checked: Aug/2017  Latest DSA: No   - Pancreas Tx Biopsy: No    # Immunosuppression: Tacrolimus immediate release (goal 5-8) and Azathioprine (dose 150 mg daily)   - Continue with intensive monitoring of immunosuppression for efficacy and toxicity.   - Changes: Not at this time    # Infection Prophylaxis:   - PJP: Sulfa/TMP (Bactrim) MWF    # Hypertension: Borderline control; Previously Goal BP: < 130/80   - Changes: Not at this time,. Encouraged to check home BP for any medication changes    # Anemia in Chronic Renal Disease: Hgb: Stable      LAURENT: No   - Iron studies: Not checked recently    # Mineral Bone Disorder:   - Secondary renal hyperparathyroidism; PTH level: Normal (15-65 pg/ml)        On treatment: None  - Vitamin  D; level: Not checked recently, but was normal last check        On supplement: Yes  - Calcium; level: Low        On supplement: Yes  - Phosphorus; level: Not checked recently, but was normal last check        On supplement: No    # Electrolytes:   - Potassium; level: Normal        On supplement: No  - Magnesium; level: Not checked recently, but was normal last check        On supplement: No  - Bicarbonate; level: Normal        On supplement: No    # Acute appendicitis:    -Hospitalized 5/13-5/16/22. Underwent appendectomy 5/15/22.     # H/o DVT with recurrent PE:   -Has permanent IVC filter in place   -Restarted apixaban on POD 5 but then changed back to Rivaroxaban.    -Bleeding and clotting recommended changing to eliquis due to gastric absorbtion concerns with altered anatomy with Rivaroxaban.     # Obesity:   -BMI 44.34kg/m2,was seeing bariatric surgery. Goal is to lose 5lbs prior to surgery but lost follow up with them as she does not want to undergo surgery yet.    -She was started on phentermine on 8/25/22 which she did not tolerate, so she discontinued   -encouraged to be active and asked her to let us know if she would be interested in weight loss management program again. Pt want to think about it.    # Depression, pt endorsed continued symptosis and likely worsening with lack of sleep. She is following with psychologist.    # Skin Cancer Risk: last visit was 10 years ago.   - Discussed sun protection and recommend regular follow up with Dermatology.    # Medical Compliance: No.  Evidence of infrequent transplant follow-up.  - Discussed importance of checking labs regularly as recommended, taking medications as prescribed and attending scheduled medical appointments.    # COVID-19 Virus Review: Discussed COVID-19 virus and the potential medical risks.  Reviewed preventative health recommendations, including wearing a mask where appropriate.  Recommended COVID vaccination should be up to date with either  an initial vaccination or booster shot when appropriate.  Asked the patient to inform the transplant center if they are exposed or diagnosed with this virus.    # COVID Vaccination Up To Date: No, due for next dose. Recommend COVID booster now     # Transplant History:  Etiology of Kidney Failure: Diabetic nephropathy  Tx: SPK  Transplant: 7/12/2000 (Kidney / Pancreas)  Significant changes in immunosuppression:  switched to AZA  Significant transplant-related complications: None    Transplant Office Phone Number: [phone]    Assessment and plan was discussed with the patient and she voiced her understanding and agreement.    Return visit: Return in about 1 year (around 10/19/2024).    Lucille Reza MD    Chief Complaint   Ms. Fountain is a 58 year old here for kidney transplant, pancreas transplant, immunosuppression management and follow-up after hospitalization.    History of Present Illness   The patient generally feels well. Endorsed that her sleep pattern was messed up and she will have naps but not having good night sleep. Endorsed that she is dealing with her depression, worsened since COVID. She lives alone and mostly stays home as he worried about acquiring COVID infection. Eventually she tried to work part time but noted that some of her colleagues/relatives were tested positive for COVID, so she had to stop working so that she will not acquire infections. Endorsed that she might have gained some weight since that she is not physically active. Mentioned that her appetite is stable/good.    The patient occasionally develops nausea. She denies vomiting, diarrhea, fever, chills, abdominal pain. She occasionally develops LE edema in her ankles, but no dysuria, frequency, hematuria, night sweats.     Home BP: Not checked    Problem List   Patient Active Problem List   Diagnosis    Hernia of abdominal cavity, with obstruction    Hernia of abdominal wall    Knee pain, bilateral    Hip pain    HTN, kidney  transplant related    Morbid obesity (H)    HERLINDA (obstructive sleep apnea)    Gastroesophageal reflux disease, unspecified whether esophagitis present    Anxiety    Recurrent major depressive disorder, remission status unspecified (H24)    History of diabetes mellitus, type I    Prediabetes    Recurrent acute deep vein thrombosis (DVT) of both lower extremities (H)    Anemia in stage 2 chronic kidney disease    Kidney replaced by transplant    Pancreas replaced by transplant (H)    Immunosuppression (H24)    Aftercare following organ transplant       Allergies   Allergies   Allergen Reactions    Wellbutrin [Bupropion] Other (See Comments)     Pt. Reports that it made her more weird       Medications   Current Outpatient Medications   Medication Sig    apixaban ANTICOAGULANT (ELIQUIS) 5 MG tablet Take 1 tablet (5 mg) by mouth 2 times daily    azaTHIOprine (IMURAN) 50 MG tablet Take 3 tablets (150 mg) by mouth daily    calcium carbonate (OS-RENATO 500 MG [place]. [place]) 500 MG tablet Take 500 mg by mouth 2 times daily    cetirizine (ZYRTEC ALLERGY) 10 MG tablet Take 0.5 tablets (5 mg) by mouth daily as needed for allergies    cholecalciferol (VITAMIN D3) 1000 units (25 mcg) capsule Take 1 capsule by mouth daily.    order for DME AIRSENSE 10  10-15CM/H20  PILLOWS CHRISTIANSON FX FOR HER    phentermine (ADIPEX-P) 15 MG capsule Take 1 capsule in the morning. May increase to 2 tablets if tolerating but hunger not controlled or no weight loss after 2 weeks.    PROGRAF (BRAND) 1 MG capsule Take 2 capsules (2 mg) by mouth 2 times daily    sulfamethoxazole-trimethoprim (BACTRIM/SEPTRA) 400-80 MG per tablet Take 1 tablet by mouth Every Mon, Wed, Fri Morning    venlafaxine (EFFEXOR XR) 75 MG 24 hr capsule Take 150 mg by mouth 2 times daily     No current facility-administered medications for this visit.     There are no discontinued medications.      Physical Exam   Vital Signs: [facility] 11/06/2015     GENERAL APPEARANCE: alert and no  distress  HENT: no obvious abnormalities on appearance  RESP: breathing appears unremarkable with normal rate, no audible wheezing or cough and no apparent shortness of breath with conversation  MS: extremities normal - no gross deformities noted  SKIN: no apparent rash and normal skin tone  NEURO: speech is clear with no obvious neurological deficits  PSYCH: mentation appears normal and affect normal      Data         Latest Ref Rng & Units 6/6/2023     3:49 PM 3/24/2023     5:10 PM 3/20/2023    12:00 PM   Renal   Na (external) 134 - 144 mmol/L 144  141        K (external) 3.5 - 5.2 mmol/L 3.8  4.1     4.1       Cl 96 - 106 mmol/L 106  106        Cl (external) 96 - 106 mmol/L 106  106        CO2 (external) 20 - 29 mmol/L 24  23        BUN (external) 6 - 24 mg/dL 16  14        Cr (external) 0.57 - 1.00 mg/dL 0.63  0.55     0.55       Glucose (external) 70 - 99 mg/dL 148  119     119       Ca (external) 8.7 - 10.2 mg/dL 9.6  8.7            This result is from an external source.         Latest Ref Rng & Units 8/25/2017    11:07 AM 9/30/2013     4:57 PM 3/24/2013     5:56 AM   Bone Health   Phosphorus 2.5 - 4.5 mg/dL   2.5    Parathyroid Hormone Intact 12 - 72 pg/mL 62      Vit D Def 20 - 75 ug/L 23  20           Latest Ref Rng & Units 6/6/2023     3:49 PM 3/24/2023     5:10 PM 5/16/2022    10:39 AM   Heme   WBC 4.0 - 11.0 10e3/uL   9.4    WBC (external) 3.4 - 10.8 x10E3/uL 7.4  7.9        Hgb 11.7 - 15.7 g/dL   13.0    Hgb (external) 11.1 - 15.9 g/dL 14.0  14.3        Plt 150 - 450 10e3/uL   247    Plt (external) 150 - 450 x10E3/uL 280  295        ABSOLUTE NEUTROPHILS (EXTERNAL) 1.4 - 7.0 x10E3/uL 5.2     5.9        ABSOLUTE LYMPHOCYTES (EXTERNAL) 0.7 - 3.1 x10E3/uL 1.2     1.2        ABSOLUTE MONOCYTES (EXTERNAL) 0.1 - 0.9 x10E3/uL 0.7     0.7        ABSOLUTE EOSINOPHILS (EXTERNAL) 0.0 - 0.4 x10E3/uL 0.2     0.1        ABSOLUTE BASOPHILS (EXTERNAL) 0.0 - 0.2 x10E3/uL 0.0     0.0            This result is from an  external source.         Latest Ref Rng & Units 6/6/2023     3:49 PM 3/24/2023     5:10 PM 3/20/2023    12:00 PM   Liver   AP (external) 44 - 121 IU/L 83     83        TBili (external) 0.0 - 1.2 mg/dL 0.2     0.3        ALT (external) 0 - 32 IU/L 11     10     10       AST (external) 0 - 40 IU/L 11     12     12       Tot Protein (external) 6.0 - 8.5 g/dL 6.8     6.9        Albumin (external) 3.8 - 4.9 g/dL 3.6     3.1            This result is from an external source.         Latest Ref Rng & Units 6/6/2023     3:49 PM 3/24/2023     5:10 PM 3/20/2023    12:00 PM   Pancreas   A1C (external) 4.8 - 5.6 %  6.2     6.2       Amylase (external) 31 - 110 U/L 55  53        Lipase (external) 31 - 110 U/L 55  53            This result is from an external source.         Latest Ref Rng & Units 9/30/2013     4:57 PM   Iron studies   Iron 35 - 180 ug/dL 36    Iron Saturation Index 15 - 46 % 15    Ferritin 10 - 300 ng/mL 67          Latest Ref Rng & Units 6/25/2018    11:15 AM 8/25/2017    11:13 AM 8/25/2017    11:10 AM   [facility] Virology   CVM DNA Quant   Plasma, EDTA anticoagulant  C    CMV QUANT IU/ML CMVND^CMV DNA Not Detected [IU]/mL  CMV DNA Not Detected     LOG IU/ML OF CMVQNT <2.1 [Log_IU]/mL  Not Calculated     BK Spec  Plasma   Whole Blood  C   BK Res BKNEG^BK Virus DNA Not Detected copies/mL BK Virus DNA Not Detected   BK Virus DNA Not Detected    BK Log <2.7 Log copies/mL Not Calculated   Not Calculated       C Corrected result       Recent Labs   Lab Test 11/18/16  0945 08/25/17  1109 06/25/18  1116 03/02/22  1414   DOSTAC 2,130 2,230 2,300  --    TACROL 5.6 5.3 6.6 5.7

## 2023-10-19 NOTE — LETTER
10/19/2023         RE: Delilah Fountain  [address]  [place] [address]        Dear Colleague,    Thank you for referring your patient, Delilah Fountain, to the [facility]. Please see a copy of my visit note below.           TRANSPLANT NEPHROLOGY CHRONIC POST TRANSPLANT VISIT    Assessment & Plan  # DDKT (SPK): Stable but no labs recently   - Baseline Creatinine:  ~ 0.6-0.75   - Proteinuria: Normal (<0.2 grams), recheck    - Date DSA Last Checked: Aug/2017      Latest DSA: No   - BK Viremia: Not checked recently due to time from transplant   - Kidney Tx Biopsy: No    -She will get labs within the next week at [facility]    -Labs q3 months    # Pancreas Tx (SPK):    - Pancreatic Exocrine Drainage: Enteric drained     - Blood glucose: Elevated blood glucose      On insulin: No   - HbA1c: Trend up due to obesity     Latest HbA1c: 6.2%, repeat    - Pancreatic enzymes: Stable   - Date DSA Last Checked: Aug/2017  Latest DSA: No   - Pancreas Tx Biopsy: No    # Immunosuppression: Tacrolimus immediate release (goal 5-8) and Azathioprine (dose 150 mg daily)   - Continue with intensive monitoring of immunosuppression for efficacy and toxicity.   - Changes: Not at this time    # Infection Prophylaxis:   - PJP: Sulfa/TMP (Bactrim) MWF    # Hypertension: Borderline control; Previously Goal BP: < 130/80   - Changes: Not at this time,. Encouraged to check home BP for any medication changes    # Anemia in Chronic Renal Disease: Hgb: Stable      LAURENT: No   - Iron studies: Not checked recently    # Mineral Bone Disorder:   - Secondary renal hyperparathyroidism; PTH level: Normal (15-65 pg/ml)        On treatment: None  - Vitamin D; level: Not checked recently, but was normal last check        On supplement: Yes  - Calcium; level: Low        On supplement: Yes  - Phosphorus; level: Not checked recently, but was normal last check        On supplement: No    # Electrolytes:   -  Potassium; level: Normal        On supplement: No  - Magnesium; level: Not checked recently, but was normal last check        On supplement: No  - Bicarbonate; level: Normal        On supplement: No    # Acute appendicitis:    -Hospitalized 5/13-5/16/22. Underwent appendectomy 5/15/22.     # H/o DVT with recurrent PE:   -Has permanent IVC filter in place   -Restarted apixaban on POD 5 but then changed back to Rivaroxaban.    -Bleeding and clotting recommended changing to eliquis due to gastric absorbtion concerns with altered anatomy with Rivaroxaban.     # Obesity:   -BMI 44.34kg/m2,was seeing bariatric surgery. Goal is to lose 5lbs prior to surgery but lost follow up with them as she does not want to undergo surgery yet.    -She was started on phentermine on 8/25/22 which she did not tolerate, so she discontinued   -encouraged to be active and asked her to let us know if she would be interested in weight loss management program again. Pt want to think about it.    # Depression, pt endorsed continued symptosis and likely worsening with lack of sleep. She is following with psychologist.    # Skin Cancer Risk: last visit was 10 years ago.   - Discussed sun protection and recommend regular follow up with Dermatology.    # Medical Compliance: No.  Evidence of infrequent transplant follow-up.  - Discussed importance of checking labs regularly as recommended, taking medications as prescribed and attending scheduled medical appointments.    # COVID-19 Virus Review: Discussed COVID-19 virus and the potential medical risks.  Reviewed preventative health recommendations, including wearing a mask where appropriate.  Recommended COVID vaccination should be up to date with either an initial vaccination or booster shot when appropriate.  Asked the patient to inform the transplant center if they are exposed or diagnosed with this virus.    # COVID Vaccination Up To Date: No, due for next dose. Recommend COVID booster now     #  Transplant History:  Etiology of Kidney Failure: Diabetic nephropathy  Tx: SPK  Transplant: 7/12/2000 (Kidney / Pancreas)  Significant changes in immunosuppression:  switched to AZA  Significant transplant-related complications: None    Transplant Office Phone Number: [phone]    Assessment and plan was discussed with the patient and she voiced her understanding and agreement.    Return visit: Return in about 1 year (around 10/19/2024).    Lucille Reza MD    Chief Complaint  Ms. Fountain is a 58 year old here for kidney transplant, pancreas transplant, immunosuppression management and follow-up after hospitalization.    History of Present Illness  The patient generally feels well. Endorsed that her sleep pattern was messed up and she will have naps but not having good night sleep. Endorsed that she is dealing with her depression, worsened since COVID. She lives alone and mostly stays home as he worried about acquiring COVID infection. Eventually she tried to work part time but noted that some of her colleagues/relatives were tested positive for COVID, so she had to stop working so that she will not acquire infections. Endorsed that she might have gained some weight since that she is not physically active. Mentioned that her appetite is stable/good.    The patient occasionally develops nausea. She denies vomiting, diarrhea, fever, chills, abdominal pain. She occasionally develops LE edema in her ankles, but no dysuria, frequency, hematuria, night sweats.     Home BP: Not checked    Problem List  Patient Active Problem List   Diagnosis    Hernia of abdominal cavity, with obstruction    Hernia of abdominal wall    Knee pain, bilateral    Hip pain    HTN, kidney transplant related    Morbid obesity (H)    HERLINDA (obstructive sleep apnea)    Gastroesophageal reflux disease, unspecified whether esophagitis present    Anxiety    Recurrent major depressive disorder, remission status unspecified (H24)    History of  diabetes mellitus, type I    Prediabetes    Recurrent acute deep vein thrombosis (DVT) of both lower extremities (H)    Anemia in stage 2 chronic kidney disease    Kidney replaced by transplant    Pancreas replaced by transplant (H)    Immunosuppression (H24)    Aftercare following organ transplant       Allergies  Allergies   Allergen Reactions    Wellbutrin [Bupropion] Other (See Comments)     Pt. Reports that it made her more weird       Medications  Current Outpatient Medications   Medication Sig    apixaban ANTICOAGULANT (ELIQUIS) 5 MG tablet Take 1 tablet (5 mg) by mouth 2 times daily    azaTHIOprine (IMURAN) 50 MG tablet Take 3 tablets (150 mg) by mouth daily    calcium carbonate (OS-RENATO 500 MG [place]. [place]) 500 MG tablet Take 500 mg by mouth 2 times daily    cetirizine (ZYRTEC ALLERGY) 10 MG tablet Take 0.5 tablets (5 mg) by mouth daily as needed for allergies    cholecalciferol (VITAMIN D3) 1000 units (25 mcg) capsule Take 1 capsule by mouth daily.    order for DME [employer] 10  10-15CM/H20  PILLOWS CHRISTIANSON FX FOR HER    phentermine (ADIPEX-P) 15 MG capsule Take 1 capsule in the morning. May increase to 2 tablets if tolerating but hunger not controlled or no weight loss after 2 weeks.    PROGRAF (BRAND) 1 MG capsule Take 2 capsules (2 mg) by mouth 2 times daily    sulfamethoxazole-trimethoprim (BACTRIM/SEPTRA) 400-80 MG per tablet Take 1 tablet by mouth Every Mon, Wed, Fri Morning    venlafaxine (EFFEXOR XR) 75 MG 24 hr capsule Take 150 mg by mouth 2 times daily     No current facility-administered medications for this visit.     There are no discontinued medications.      Physical Exam  Vital Signs: [facility] 11/06/2015     GENERAL APPEARANCE: alert and no distress  HENT: no obvious abnormalities on appearance  RESP: breathing appears unremarkable with normal rate, no audible wheezing or cough and no apparent shortness of breath with conversation  MS: extremities normal - no gross deformities noted  SKIN: no apparent  rash and normal skin tone  NEURO: speech is clear with no obvious neurological deficits  PSYCH: mentation appears normal and affect normal      Data        Latest Ref Rng & Units 6/6/2023     3:49 PM 3/24/2023     5:10 PM 3/20/2023    12:00 PM   Renal   Na (external) 134 - 144 mmol/L 144  141        K (external) 3.5 - 5.2 mmol/L 3.8  4.1     4.1       Cl 96 - 106 mmol/L 106  106        Cl (external) 96 - 106 mmol/L 106  106        CO2 (external) 20 - 29 mmol/L 24  23        BUN (external) 6 - 24 mg/dL 16  14        Cr (external) 0.57 - 1.00 mg/dL 0.63  0.55     0.55       Glucose (external) 70 - 99 mg/dL 148  119     119       Ca (external) 8.7 - 10.2 mg/dL 9.6  8.7            This result is from an external source.         Latest Ref Rng & Units 8/25/2017    11:07 AM 9/30/2013     4:57 PM 3/24/2013     5:56 AM   Bone Health   Phosphorus 2.5 - 4.5 mg/dL   2.5    Parathyroid Hormone Intact 12 - 72 pg/mL 62      Vit D Def 20 - 75 ug/L 23  20           Latest Ref Rng & Units 6/6/2023     3:49 PM 3/24/2023     5:10 PM 5/16/2022    10:39 AM   Heme   WBC 4.0 - 11.0 10e3/uL   9.4    WBC (external) 3.4 - 10.8 x10E3/uL 7.4  7.9        Hgb 11.7 - 15.7 g/dL   13.0    Hgb (external) 11.1 - 15.9 g/dL 14.0  14.3        Plt 150 - 450 10e3/uL   247    Plt (external) 150 - 450 x10E3/uL 280  295        ABSOLUTE NEUTROPHILS (EXTERNAL) 1.4 - 7.0 x10E3/uL 5.2     5.9        ABSOLUTE LYMPHOCYTES (EXTERNAL) 0.7 - 3.1 x10E3/uL 1.2     1.2        ABSOLUTE MONOCYTES (EXTERNAL) 0.1 - 0.9 x10E3/uL 0.7     0.7        ABSOLUTE EOSINOPHILS (EXTERNAL) 0.0 - 0.4 x10E3/uL 0.2     0.1        ABSOLUTE BASOPHILS (EXTERNAL) 0.0 - 0.2 x10E3/uL 0.0     0.0            This result is from an external source.         Latest Ref Rng & Units 6/6/2023     3:49 PM 3/24/2023     5:10 PM 3/20/2023    12:00 PM   Liver   AP (external) 44 - 121 IU/L 83     83        TBili (external) 0.0 - 1.2 mg/dL 0.2     0.3        ALT (external) 0 - 32 IU/L 11     10     10        AST (external) 0 - 40 IU/L 11     12     12       Tot Protein (external) 6.0 - 8.5 g/dL 6.8     6.9        Albumin (external) 3.8 - 4.9 g/dL 3.6     3.1            This result is from an external source.         Latest Ref Rng & Units 6/6/2023     3:49 PM 3/24/2023     5:10 PM 3/20/2023    12:00 PM   Pancreas   A1C (external) 4.8 - 5.6 %  6.2     6.2       Amylase (external) 31 - 110 U/L 55  53        Lipase (external) 31 - 110 U/L 55  53            This result is from an external source.         Latest Ref Rng & Units 9/30/2013     4:57 PM   Iron studies   Iron 35 - 180 ug/dL 36    Iron Saturation Index 15 - 46 % 15    Ferritin 10 - 300 ng/mL 67          Latest Ref Rng & Units 6/25/2018    11:15 AM 8/25/2017    11:13 AM 8/25/2017    11:10 AM   [facility] Txp Virology   CVM DNA Quant   Plasma, EDTA anticoagulant  C    CMV QUANT IU/ML CMVND^CMV DNA Not Detected [IU]/mL  CMV DNA Not Detected     LOG IU/ML OF CMVQNT <2.1 [Log_IU]/mL  Not Calculated     BK Spec  Plasma   Whole Blood  C   BK Res BKNEG^BK Virus DNA Not Detected copies/mL BK Virus DNA Not Detected   BK Virus DNA Not Detected    BK Log <2.7 Log copies/mL Not Calculated   Not Calculated       C Corrected result       Recent Labs   Lab Test 11/18/16  0945 08/25/17  1109 06/25/18  1116 03/02/22  1414   DOSTAC 2,130 2,230 2,300  --    TACROL 5.6 5.3 6.6 5.7             Again, thank you for allowing me to participate in the care of your patient.        Sincerely,        Lucille Reza MD

## 2023-10-19 NOTE — PATIENT INSTRUCTIONS
Continue good hydration   Labs this week  Continue your current medications  Work on your weight and sleep habits

## 2023-10-20 PROBLEM — Z79.01 ANTICOAGULATED: Status: ACTIVE | Noted: 2023-10-20

## 2024-01-11 DIAGNOSIS — Z79.899 ENCOUNTER FOR LONG-TERM (CURRENT) USE OF HIGH-RISK MEDICATION: ICD-10-CM

## 2024-01-11 DIAGNOSIS — Z94.83 PANCREAS REPLACED BY TRANSPLANT (H): ICD-10-CM

## 2024-01-11 DIAGNOSIS — Z94.0 KIDNEY TRANSPLANTED: ICD-10-CM

## 2024-01-11 DIAGNOSIS — Z48.298 AFTERCARE FOLLOWING ORGAN TRANSPLANT: Primary | ICD-10-CM

## 2024-01-11 RX ORDER — SODIUM BICARBONATE 650 MG/1
650 TABLET ORAL 2 TIMES DAILY
COMMUNITY

## 2024-01-11 RX ORDER — LURASIDONE HYDROCHLORIDE 60 MG/1
60 TABLET, FILM COATED ORAL DAILY
COMMUNITY
Start: 2023-10-10 | End: 2024-01-31

## 2024-01-11 NOTE — PROGRESS NOTES
Gilda message from Delilah. Requested post-transplant labs to follow up on blood glucose and A1C values after starting Latuda (Lurasidone) titrated up by her psychiatrist to 60 mg daily. This can cause tacrolimus levels to rise and she is not obtaining post transplant labs monthly as recommended. Last labs on file are from 2023. Updated lab requisition letter sent today (was set to  in 2024) and notified patient. Should follow up with PCP regarding post-menopause hormone related questions.     Alem Diaz, RN, BSN  Solid Organ Transplant, Post Kidney and Pancreas  Transplant Care Coordinator  [phone]

## 2024-01-11 NOTE — LETTER
OUTPATIENT LABORATORY TEST ORDER    Patient Name: Delilah Fountain                                          Transplant Date: 7/12/2000  YOB: 1965                                               Issue Date & Time: 1/11/2024  10:36 AM   [facility] MR: [id number]                                                 Exp. Date (1 year after date issued)    Diagnoses:   Kidney Transplant (ICD-10  Z94.0)      Pancreas Transplant (ICD-10  Z94.83)     Long term use of medications (ICD-10  Z79.899)              Lab results to be available on the same day drawn.   Please release results to patient upon their request    Please fax to the Transplant Center at [phone].    Monthly   Complete Blood Count with Platelets    Basic Metabolic Panel (Sodium, Potassium, Chloride, CO2, Creatinine, Urea Nitrogen, Glucose, Calcium)   Amylase, Lipase   /Tacrolimus/Prograf drug level (12 hour trough). Ensure 12 hours between last dose and blood draw.    Every 6 months:             Glycosylated Hemoglobin (A1c)              Urine for Protein/Creatinine      If you have any questions, please call [facility] at [phone] or [phone].      .  Lucille Reza MD

## 2024-01-30 ENCOUNTER — TELEPHONE (OUTPATIENT)
Dept: TRANSPLANT | Facility: CLINIC | Age: 59
End: 2024-01-30
Payer: COMMERCIAL

## 2024-01-30 DIAGNOSIS — R45.89 DEPRESSED MOOD: Primary | ICD-10-CM

## 2024-01-30 DIAGNOSIS — Z94.83 PANCREAS REPLACED BY TRANSPLANT (H): ICD-10-CM

## 2024-01-30 RX ORDER — SULFAMETHOXAZOLE AND TRIMETHOPRIM 400; 80 MG/1; MG/1
1 TABLET ORAL
Qty: 40 TABLET | Refills: 0 | OUTPATIENT
Start: 2024-01-31

## 2024-01-30 NOTE — TELEPHONE ENCOUNTER
Please call Delilah.  She stated she had her labs drawn on 01/23/2024 at [facility],  (We have not received her labs yet) [ (I faxed a request with updated lab order to them received ok confirmation)].     Delilah's last set of lab drawn were 06/06/2023.  (We have) no lab draws in between then and now.    She mentioned her antidepressant medication Lurasidone was increased to 80mg from 60mg.     Was told this medication can cause her blood sugar increase.    Gave verbal results;  01/23/2024      Glucose = 158    Fasting   Serum/Teodora = 46  Serum/Lipase = 23  Cr.,=  0.79  Albumin = 3.5  Total Protein = 6.9

## 2024-01-30 NOTE — LETTER
PHYSICIAN ORDERS      DATE & TIME ISSUED: 2024 11:35 AM  PATIENT NAME: Delilah Fountain   : 1965     [facility] MR# [if applicable]: [id number]     DIAGNOSIS:  Pancreas Transplanted; Hyperglycemia  ICD-10 CODE: Z94.83;  R73.9    Please check the following labs within one week:  - Hemoglobin A1C  - Blood Glucose       Any questions please call: [phone] option 5    Please fax each result same day as resulted/available [phone].  Critical lab results page [phone]      .  Lucille Reza MD

## 2024-01-31 RX ORDER — LURASIDONE HYDROCHLORIDE 60 MG/1
TABLET, FILM COATED ORAL
COMMUNITY
End: 2024-10-02

## 2024-01-31 RX ORDER — LURASIDONE HYDROCHLORIDE 80 MG/1
80 TABLET, FILM COATED ORAL
COMMUNITY
Start: 2023-12-23 | End: 2024-10-02

## 2024-01-31 NOTE — TELEPHONE ENCOUNTER
RNCC reviewed patient chart and lab results.   Tacrolimus level at goal 5-8. (Latuda can increase tacrolimus concentration level.) Will continue to monitor on routine post-transplant labs.   Kidney function and pancreas function stable.  Glucose elevated per pt d/t Latuda/lurasidone 80 mg PO daily. Prescription med list updated for reported dose change from 60 mg to 80 mg. Notified transplant nephrologist.     Reviewed with Provider, Dr. Reza:  Message  Received: Yesterday  Lucille Reza MD Blaisdell, Christin Rebecca, RN  Caller: Unspecified (Yesterday,  1:23 PM)  Guanakito Ferrara    I am not that aware of that medication, but seems like it does increase A1c levels. Yes we can check and see if they had to adjust the dose if it is high.    Thank you for checking with me  Lucille          Previous Messages  ----- Message -----  From: Alem Diaz RN  Sent: 1/30/2024   4:50 PM CST  To: Alem Diaz RN; *    Hi Dr. Reza, Do you recommend checking a hemoglobin A1C value to [place] how her blood sugars are looking since increasing this medication? Overall, looks like creatinine and pancreas enzymes are as expected.    Alem Diaz RN, BSN  Solid Organ Transplant, Post Kidney and Pancreas  Transplant Care Coordinator  [phone]     OUTCOME:   Spoke with Delilah, We discussed possibility of developing type 2 diabetes. Pancreas was transplanted for type 1 diabetes. She admits she has been more sedentary d/t depression struggles. Also, only eating 2 meals per day, such as Healthy Choice frozen dinner because she doesn't have the get up and go to prepare meals. Watching her sodium intake. Discussed increasing exercise activity and she reports walking more lately. We discussed continuing to work on her diet, reducing carbohydrate intake. Confirms this was a fasting blood glucose draw. Will have A1C checked and repeat her blood sugar test, then follow up with her PCP on 2/8/24 as  scheduled. She also notes that her psychiatrist decreased the dose of Latuda from 80 mg to 60 mg PO because reportedly glucose levels are higher at the 80 mg dose. Again med list updated. She also notes she will try harder to get her post-transplant routine labs done. Reassured her that her kidney function and pancreas enzyme levels were stable on her 1/24/24 lab collection.

## 2024-02-05 ENCOUNTER — TRANSFERRED RECORDS (OUTPATIENT)
Dept: HEALTH INFORMATION MANAGEMENT | Facility: CLINIC | Age: 59
End: 2024-02-05
Payer: COMMERCIAL

## 2024-02-07 ENCOUNTER — TELEPHONE (OUTPATIENT)
Dept: TRANSPLANT | Facility: CLINIC | Age: 59
End: 2024-02-07
Payer: COMMERCIAL

## 2024-02-07 DIAGNOSIS — Z94.83 PANCREAS REPLACED BY TRANSPLANT (H): ICD-10-CM

## 2024-02-07 DIAGNOSIS — E66.01 MORBID OBESITY (H): Primary | ICD-10-CM

## 2024-02-07 DIAGNOSIS — Z94.0 KIDNEY TRANSPLANTED: ICD-10-CM

## 2024-02-07 NOTE — TELEPHONE ENCOUNTER
Patient called to follow up on a conversation from a couple weeks regarding her PCP wanting to prescribe Mounjaro 2.5 mg to low the A1C of 7.3. patient PCP is wanting to start the medication. Patient has some questions.

## 2024-02-08 NOTE — TELEPHONE ENCOUNTER
[facility] Solid Organ Transplant Nephrology recommends the following, regarding medications for Diabetes and Weight Management, as ordered / managed by your Endocrinologist or Primary Care Provider:    Drug Class Drug Name Contraindications Considerations   GLP-1 Inhibitor Semaglutide (Ozempic)   Liraglutide (Victoza)   Dulaglutide (Trulicity)   Exenatide (Byetta) Pancreas transplant Increased risk for pancreatitis  Increased risk for dehydration     SGLT2 Inhibitors Empagliflozin (Jardiance)   Canagliflozin (Invokana)   Dapagliflozin (Farxiga)   Bexagliflozin (Brenzavvy)   Ertuglifozin (Steglatro) GFR <30mL/min (absolute)  GFR <45mL/min (relative)  Recurrent UTIs (relative) Glucosuria   Potential increased risk for UTIs     Staff msg sent:  GIP/GLP-1 RA, tirzepatide, brand names Mounjaro and Zepbound, is another antidiabetic medication used for the treatment of type 2 diabetes and for weight loss we quite frequently are having patient's call about using post pancreas transplant. If used at lowest dose 2.5 mg, with good renal function, can patient proceed with obtaining prescription from her PCP?     Alem Diaz, RN, BSN  Solid Organ Transplant, Post Kidney and Pancreas  Transplant Care Coordinator  [phone]

## 2024-02-08 NOTE — TELEPHONE ENCOUNTER
Message  Received: Today  Max Haas MD Blaisdell, Christin Rebecca, RN  Cc: Gloria Panchal RN  Caller: Unspecified (Yesterday,  4:22 PM)  Yes.          Previous Messages       ----- Message -----  From: Alem Diaz RN  Sent: 2/8/2024  12:05 PM CST  To: Max Haas MD; *    ----- Message from Alem Diaz RN sent at 2/8/2024 12:05 PM CST -----  GIP/GLP-1 RA, tirzepatide, brand names Mounjaro and Zepbound, is another antidiabetic medication used for the treatment of type 2 diabetes and for weight loss we quite frequently are having patient's call about using post pancreas transplant.    Dr. Haas, If used at lowest dose 2.5 mg, with good renal function, can patient proceed with obtaining prescription from her PCP?    Alem    OUTCOME: RNCC relayed information to jake Mazariegos to proceed with Mounjaro at lowest dose for diabetic management/rise in A1C to 7.3. We discussed risk of pancreatitis and dehydration/OLGA. She maintains adequate hydration, knows to monitor lipase levels monthly and report any abdominal/pancreas graft pain.     Alem Diaz RN, BSN  Solid Organ Transplant, Post Kidney and Pancreas  Transplant Care Coordinator  [phone]

## 2024-02-09 NOTE — TELEPHONE ENCOUNTER
"RNCC placed call to Delilah to retract decision for Mounjaro injections post pancreas transplant after further discussion with leadership and provider.     \"We would not recommend GLP-1 inhibitors for pancreas patients.     It is true that higher doses of the medications do have higher risks, but we don't know what the risk might be with lower doses.  Also, having some kidney dysfunction increases the risk of pancreatitis.\"     Asked that Delilah return call to RNCC.     Alem Diaz, RN, BSN  Solid Organ Transplant, Post Kidney and Pancreas  Transplant Care Coordinator  [phone]     Addendum: Delilah promptly returned call to discuss further. RNCC indicated that transplant is being approached more frequently about these medications, GLP-1 inhibitors, they are newer and not as well researched so we did not have enough data to support using at a lower dose at risk of causing pancreatitis or acute kidney injury. Delilah states she is at 300 lbs and with depression struggles with weight loss. Her biggest concern is that her blood sugar values are high and that her A1C level has jumped. She wants what is best for her transplanted organs. RNCC expressed concern for diabetes type 2 and the management/interventions that previously were implemented for this diagnosis prior to marketing of GLP-1 inhibitors. Follow up with PCP or endocrinologist for alternative management. Check in with Dr. Haas on further recommendations from his standpoint; perhaps sending another referral for weight management referral originally sent by Dr. Crespo 3/3/22.    "

## 2024-02-12 ENCOUNTER — TRANSFERRED RECORDS (OUTPATIENT)
Dept: HEALTH INFORMATION MANAGEMENT | Facility: CLINIC | Age: 59
End: 2024-02-12

## 2024-02-14 NOTE — TELEPHONE ENCOUNTER
RNCC returned call to Delilah. Consensus among transplant nephrologists is to avoid GLP-1a inhibitors d/t risk of pancreatitis. Pt voiced understanding.    [employer] message sent to Delilah with the following list of meds contraindicated post pancreas transplant.    [facility] Solid Organ Transplant Nephrology recommends the following, regarding medications for Diabetes and Weight Management, as ordered / managed by your Endocrinologist or Primary Care Provider:    Drug Class Drug Name Contraindications Considerations   GLP-1 Inhibitor Semaglutide (Ozempic)   Liraglutide (Victoza)   Dulaglutide (Trulicity)   Exenatide (Byetta)  Tirzepatide : GLP-1a (Mounjaro, Zepbound)   Pancreas transplant Increased risk for pancreatitis  Increased risk for dehydration     SGLT2 Inhibitors Empagliflozin (Jardiance)   Canagliflozin (Invokana)   Dapagliflozin (Farxiga)   Bexagliflozin (Brenzavvy)   Ertuglifozin (Steglatro) GFR <30mL/min (absolute)  GFR <45mL/min (relative)  Recurrent UTIs (relative) Glucosuria   Potential increased risk for UTIs     Referral sent for weight loss lifestyle management and for endocrinologist located at the [facility]. Phone numbers provided to patient to call if not heard from schedulers.     Alem Diaz RN, BSN  Solid Organ Transplant, Post Kidney and Pancreas  Transplant Care Coordinator  [phone]

## 2024-02-27 ENCOUNTER — MYC MEDICAL ADVICE (OUTPATIENT)
Dept: OTHER | Age: 59
End: 2024-02-27

## 2024-02-28 NOTE — TELEPHONE ENCOUNTER
FW: Is this medication alright for me?  Received: Yesterday  Ottoniel Alvarado, [facility]  Alem Diaz, PIYUSH  Phone Number: [phone]     Yes it can rarely          Previous Messages       ----- Message -----  From: Alem Diaz RN  Sent: 2/27/2024   4:20 PM CST  To: Ottoniel Alvarado [facility]  Subject: FW: Is this medication alright for me?          Is the BP monitoring recommended because Cymbalta increases blood pressure?  ----- Message -----  From: Delilah Fountain  Sent: 2/27/2024   3:42 PM CST  To: Alem Diaz RN  Subject: Is this medication alright for me?              Actually, my BP tends to be a bit on the high side. Does that change the advice?

## 2024-03-26 DIAGNOSIS — Z94.0 KIDNEY TRANSPLANTED: ICD-10-CM

## 2024-03-26 DIAGNOSIS — Z94.83 PANCREAS REPLACED BY TRANSPLANT (H): ICD-10-CM

## 2024-03-26 RX ORDER — AZATHIOPRINE 50 MG/1
150 TABLET ORAL DAILY
Qty: 90 TABLET | Refills: 11 | Status: SHIPPED | OUTPATIENT
Start: 2024-03-26

## 2024-05-08 ENCOUNTER — VIRTUAL VISIT (OUTPATIENT)
Dept: HEMATOLOGY | Facility: CLINIC | Age: 59
End: 2024-05-08
Attending: INTERNAL MEDICINE
Payer: COMMERCIAL

## 2024-05-08 DIAGNOSIS — Z86.711 HISTORY OF PULMONARY EMBOLISM: ICD-10-CM

## 2024-05-08 PROCEDURE — 99213 OFFICE O/P EST LOW 20 MIN: CPT | Mod: 93 | Performed by: INTERNAL MEDICINE

## 2024-05-08 NOTE — PROGRESS NOTES
[facility]  [address] [place] [address] [address] [address]  Main: [phone], Fax: [phone]    Patient: Delilah Fountain  MRN: [id number]  : 1965  DONALDO: May 8, 2024     Reason for visit:  Follow up recurrent DVT    Assessment:  Delilah Fountain is a 58 year old woman with history of type 1 diabetes s/p pancreas/kidney transplant with postoperative DVT in  treated initially with LMWH and IVC filter (permanent) and unprovoked bilateral PE in 2020 who was initially treated with warfarin but eventually changed to Xarelto and last year changed to apixaban.    Plan:  1. Today we discussed the risks and benefits of continued secondary prophylaxis with apixaban  2. Patient instructed to contact the clinic should they require any surgical procedures for perioperative planning.   3. Return to clinic in 1 year    15 minutes spent by me on the date of the encounter doing chart review, review of test results, interpretation of tests, patient visit and documentation      10 minute phone call    --------------------------------------------------  Clinical History Summary:  Delilah Fountain is a 58 year old woman with past medical history significant for type 1 DM s/p pancreas/kidney transplant with postoperative DVT in  treated with LMWH and permanent IVC filter. In 2020, she had unprovoked bilateral pulmonary embolism and was treated with LMWH and warfarin initially due to concern about drug interactions and morbid obesity. She eventually was changed to Xarelto 20mg once daily. Due to shortness of breath symptoms reported at her 2021 visit, a peak Rivaroxaban level was checked to ensure adequate absorption. Unfortunately, she had missed her dose the morning of the lab testing. She has not completed this yet.     Interim History:  Today, Delilah repoerts she is doing well. She has no specific concerns about anticoagulation.  She feels  that the twice-a-day dosing is manageable and still prefers this over rivaroxaban. She reports no new leg pain or chest pain.  She previously was concerned about shortness of breath and dyspnea on exertion but this is much better since she started doing intentional waking for exercise. She has also intentionally lost weight.    Exam:   By phone she was in no distress, with clear and linear speech.  Affect was normal.  No shortness of breath or cough.     Labs:   Latest Reference Range & Units 06/06/23 15:49 01/24/24 10:15   Creatinine (External) 0.57 - 1.00 mg/dL 0.63 0.79 (E)   (E): External lab result    Imaging:  none

## 2024-05-08 NOTE — PROGRESS NOTES
Patient was contacted to complete the pre-visit call prior to their telephone visit with the provider.     Allergies and medications were reviewed.     I thanked them for their time to cover this information.     Sandra Sepulveda MA

## 2024-05-26 ENCOUNTER — HEALTH MAINTENANCE LETTER (OUTPATIENT)
Age: 59
End: 2024-05-26

## 2024-07-23 DIAGNOSIS — Z94.0 KIDNEY TRANSPLANTED: ICD-10-CM

## 2024-07-23 RX ORDER — TACROLIMUS 1 MG/1
2 CAPSULE, GELATIN COATED ORAL 2 TIMES DAILY
Qty: 120 CAPSULE | Refills: 0 | Status: SHIPPED | OUTPATIENT
Start: 2024-07-23 | End: 2024-08-12

## 2024-07-23 RX ORDER — SODIUM BICARBONATE 650 MG/1
650 TABLET ORAL 2 TIMES DAILY
Status: CANCELLED | OUTPATIENT
Start: 2024-07-23

## 2024-07-24 DIAGNOSIS — Z48.298 AFTERCARE FOLLOWING ORGAN TRANSPLANT: Primary | ICD-10-CM

## 2024-07-24 DIAGNOSIS — Z94.83 PANCREAS REPLACED BY TRANSPLANT (H): ICD-10-CM

## 2024-07-24 DIAGNOSIS — Z94.0 KIDNEY TRANSPLANTED: ICD-10-CM

## 2024-07-24 NOTE — PROGRESS NOTES
Due for annual RKT visit with transplant nephrology 10/19/24. Order entered in EPIC for schedulers to call Delilah.    Alem Diaz, RN, BSN, CCTN  Solid Organ Transplant, Post Kidney and Pancreas  Transplant Care Coordinator  [phone]

## 2024-08-12 DIAGNOSIS — Z94.0 KIDNEY TRANSPLANTED: ICD-10-CM

## 2024-08-12 DIAGNOSIS — Z94.83 PANCREAS REPLACED BY TRANSPLANT (H): Primary | ICD-10-CM

## 2024-08-15 RX ORDER — TACROLIMUS 1 MG/1
2 CAPSULE, GELATIN COATED ORAL 2 TIMES DAILY
Qty: 120 CAPSULE | Refills: 0 | Status: SHIPPED | OUTPATIENT
Start: 2024-08-15 | End: 2024-09-10

## 2024-09-10 DIAGNOSIS — Z94.0 KIDNEY TRANSPLANTED: ICD-10-CM

## 2024-09-10 RX ORDER — TACROLIMUS 1 MG/1
2 CAPSULE, GELATIN COATED ORAL 2 TIMES DAILY
Qty: 120 CAPSULE | Refills: 0 | Status: SHIPPED | OUTPATIENT
Start: 2024-09-10

## 2024-09-19 ENCOUNTER — TELEPHONE (OUTPATIENT)
Dept: MULTI SPECIALTY CLINIC | Facility: CLINIC | Age: 59
End: 2024-09-19
Payer: COMMERCIAL

## 2024-09-19 NOTE — TELEPHONE ENCOUNTER
PA Initiation    Medication: TACROLIMUS (PROGRAF BRAND) 1 MG PO CAPS  Insurance Company: [employer] - Phone [phone] Fax [phone]  Pharmacy Filling the Rx: [facility] - [place], [place] - [facility] [address]  Filling Pharmacy Phone: [phone]  Filling Pharmacy Fax: [phone]  Start Date: 9/19/2024

## 2024-09-19 NOTE — TELEPHONE ENCOUNTER
Prior Authorization Approval    Medication: TACROLIMUS (PROGRAF BRAND) 1 MG PO CAPS  Authorization Effective Date: 9/1/2024  Authorization Expiration Date: 9/19/2025  Approved Dose/Quantity:   Reference #: [id number]   Insurance Company: [employer] - Phone [phone] Fax [phone]  Expected CoPay: $    CoPay Card Available:      Financial Assistance Needed: n/a  Which Pharmacy is filling the prescription: [facility] - [place] [address] [address]  Pharmacy Notified: yes  Patient Notified: pharmacy will notify pt

## 2024-10-02 ENCOUNTER — VIRTUAL VISIT (OUTPATIENT)
Dept: TRANSPLANT | Facility: CLINIC | Age: 59
End: 2024-10-02
Attending: INTERNAL MEDICINE
Payer: COMMERCIAL

## 2024-10-02 VITALS — WEIGHT: 280 LBS | HEIGHT: 68 IN | BODY MASS INDEX: 42.44 KG/M2

## 2024-10-02 DIAGNOSIS — K21.9 GASTROESOPHAGEAL REFLUX DISEASE, UNSPECIFIED WHETHER ESOPHAGITIS PRESENT: ICD-10-CM

## 2024-10-02 DIAGNOSIS — Z79.01 ANTICOAGULATED: ICD-10-CM

## 2024-10-02 DIAGNOSIS — Z86.711 HISTORY OF PULMONARY EMBOLISM: ICD-10-CM

## 2024-10-02 DIAGNOSIS — Z94.0 KIDNEY TRANSPLANTED: ICD-10-CM

## 2024-10-02 DIAGNOSIS — G47.33 OSA (OBSTRUCTIVE SLEEP APNEA): ICD-10-CM

## 2024-10-02 DIAGNOSIS — Z29.89 NEED FOR PNEUMOCYSTIS PROPHYLAXIS: ICD-10-CM

## 2024-10-02 DIAGNOSIS — Z94.83 PANCREAS REPLACED BY TRANSPLANT (H): ICD-10-CM

## 2024-10-02 DIAGNOSIS — D63.1 ANEMIA IN STAGE 2 CHRONIC KIDNEY DISEASE: ICD-10-CM

## 2024-10-02 DIAGNOSIS — N18.2 ANEMIA IN STAGE 2 CHRONIC KIDNEY DISEASE: ICD-10-CM

## 2024-10-02 DIAGNOSIS — D84.9 IMMUNOSUPPRESSION (H): Primary | ICD-10-CM

## 2024-10-02 DIAGNOSIS — F32.A DEPRESSION, UNSPECIFIED DEPRESSION TYPE: ICD-10-CM

## 2024-10-02 DIAGNOSIS — I15.1 HTN, KIDNEY TRANSPLANT RELATED: ICD-10-CM

## 2024-10-02 DIAGNOSIS — E66.01 MORBID OBESITY (H): ICD-10-CM

## 2024-10-02 DIAGNOSIS — Z86.39 HISTORY OF DIABETES MELLITUS, TYPE I: ICD-10-CM

## 2024-10-02 DIAGNOSIS — Z48.298 AFTERCARE FOLLOWING ORGAN TRANSPLANT: ICD-10-CM

## 2024-10-02 DIAGNOSIS — Z94.0 HTN, KIDNEY TRANSPLANT RELATED: ICD-10-CM

## 2024-10-02 PROCEDURE — 99215 OFFICE O/P EST HI 40 MIN: CPT | Mod: 95 | Performed by: INTERNAL MEDICINE

## 2024-10-02 RX ORDER — DULOXETIN HYDROCHLORIDE 30 MG/1
90 CAPSULE, DELAYED RELEASE ORAL DAILY
COMMUNITY
Start: 2024-10-02

## 2024-10-02 RX ORDER — LURASIDONE HYDROCHLORIDE 40 MG/1
40 TABLET, FILM COATED ORAL
COMMUNITY
Start: 2024-10-02

## 2024-10-02 ASSESSMENT — PAIN SCALES - GENERAL: PAINLEVEL: NO PAIN (0)

## 2024-10-02 NOTE — LETTER
10/2/2024      Delilah Fountain  [address]  [facility] [address]      Dear Colleague,    Thank you for referring your patient, Delilah Fountain, to the [facility]. Please see a copy of my visit note below.    Virtual Visit Details    Type of service:  Video Visit   Video Start Time: 9:41 AM  Video End Time:10:01 AM    Originating Location (pt. Location): Home    Distant Location (provider location):  Off-site  Platform used for Video Visit: [facility]    TRANSPLANT NEPHROLOGY CLINIC VISIT     Assessment & Plan  # DDKT (SPK): CKD Stage 2 - Stable   - Baseline Creatinine: ~ 0.6-0.8   - Proteinuria: Normal (<0.2 grams), not recently checked, will repeat   - DSA Hx: No DSA   - Last cPRA: 43%   - BK Viremia: No   - Kidney Tx Biopsy Hx: No biopsy history.    # Pancreas Tx (SPK):    - Pancreatic Exocrine Drainage: Enteric drained     - Blood glucose: Elevated blood glucose      On insulin: No   - HbA1c: Trend up      Latest HbA1c: 7.3%   - Pancreatic enzymes: Stable   - DSA Hx: No DSA   - Pancreas Tx Biopsy Hx: No biopsy history    # Immunosuppression: Tacrolimus immediate release (goal 5-8) and Azathioprine (dose 150 mg daily)   - Induction with Recent Transplant:  Not known due to time from transplant   - Continue with intensive monitoring of immunosuppression for efficacy and toxicity.   - Historical Changes in Immunosuppression: None   - Changes: Not at this time    # Infection Prevention:      - PJP: Sulfa/TMP (Bactrim)      - CMV IgG Ab High Risk Discordance (D+/R-): Unknown  CMV Serostatus: Unknown  - EBV IgG Ab High Risk Discordance (D+/R-): Unknown  EBV Serostatus: Unknown    # Hypertension: Controlled;  Goal BP: < 130/80   - Changes: Not at this time, not on medication    # Diabetes: Borderline control (HbA1c 7-9%) Last HbA1c: 7.3%    # Anemia in Chronic Renal Disease: Hgb: Stable      LAURENT: No   - Iron studies: Not checked recently    # Mineral Bone Disorder:    -  Secondary renal hyperparathyroidism; PTH level: Normal (15-65 pg/ml)        On treatment: None  - Vitamin D; level: Not checked recently, but was normal last check        On supplement: No  - Calcium; level: Normal        On supplement: Yes    # Electrolytes:   - Potassium; level: Normal        On supplement: No  - Bicarbonate; level: Normal        On supplement: Yes  - Sodium; level: Normal    # Other Significant PMH:   - # Acute appendicitis:               -Hospitalized 5/13-5/16/22. Underwent appendectomy 5/15/22.      # H/o DVT with recurrent PE:              -Has permanent IVC filter in place              -on apixaban 5 mg BID     # Obesity:              -BMI 44.34kg/m2,has seen bariatric surgery. Per her GP, gastric surgery might not be a good idea    - will check with team if Manjaro is a good alternative, otherwise Ozempic can cause graft pancreatitis, would avoid at this time.   - referral placed to nutrition for better diet control              -encouraged to be active and asked her to let us know if she would be interested in weight loss management program again. Pt want to think about it.     # Depression: She is following with psychologist.     # Skin Cancer Risk:    - Discussed sun protection and recommend regular follow up with Dermatology.    # Transplant History:  Etiology of Kidney Failure: Diabetic nephropathy  Tx: DDKT (SPK)  Transplant: 7/12/2000 (Kidney / Pancreas)  Significant transplant-related complications: None    Transplant Office Phone Number: [phone]    Assessment and plan was discussed with the patient and she voiced her understanding and agreement.    Return visit: Return in about 1 year (around 10/2/2025).    Lucille Reza MD    The longitudinal plan of care for the diagnosis(es)/condition(s) as documented were addressed during this visit. Due to the added complexity in care, I will continue to support Delilah in the subsequent management and with ongoing continuity of  care.      Chief Complaint  Ms. Fountain is a 59 year old here for kidney transplant, pancreas transplant, and immunosuppression management.     History of Present Illness   Ms. Fountain reports feeling stable overall.  Since last clinic visit:   Hospitalizations: No   New Medical Issues: Yes, URI symptoms since her return from recent trip to visit her friends. Home COVID test was negative. May be started to feel better this morning.  Chest pain or shortness of breath: Yes, with increased physical activity during her travel felt more SOB, no significant chest pressure/pain  Lower extremity swelling: No  Weight change: Yes, she last 10-15 pounds but gained some because of carb diet, but trying to loose weight  Nausea and vomiting: No  Diarrhea: No  Heartburn symptoms: No  Fever, sweats or chills: No  Urinary complaints: No    Home BP:  130's systolic      Problem List  Patient Active Problem List   Diagnosis     Hernia of abdominal cavity, with obstruction     Hernia of abdominal wall     Knee pain, bilateral     Hip pain     HTN, kidney transplant related     Morbid obesity (H)     HERLINDA (obstructive sleep apnea)     Gastroesophageal reflux disease, unspecified whether esophagitis present     Anxiety     Recurrent major depressive disorder, remission status unspecified (H)     History of diabetes mellitus, type I     Prediabetes     Recurrent acute deep vein thrombosis (DVT) of both lower extremities (H)     Anemia in stage 2 chronic kidney disease     Kidney replaced by transplant     Pancreas replaced by transplant (H)     Immunosuppression (H)     Aftercare following organ transplant     Anticoagulated       Allergies  Allergies   Allergen Reactions     Wellbutrin [Bupropion] Other (See Comments)     Pt. Reports that it made her more weird       Medications  Current Outpatient Medications   Medication Sig Dispense Refill     apixaban ANTICOAGULANT (ELIQUIS) 5 MG tablet Take 1 tablet (5 mg) by mouth 2 times daily  "60 tablet 11     azaTHIOprine (IMURAN) 50 MG tablet Take 3 tablets (150 mg) by mouth daily 90 tablet 11     calcium carbonate (OS-RENATO 500 MG [place]. [place]) 500 MG tablet Take 500 mg by mouth 2 times daily       cetirizine (ZYRTEC ALLERGY) 10 MG tablet Take 0.5 tablets (5 mg) by mouth daily as needed for allergies       cholecalciferol (VITAMIN D3) 1000 units (25 mcg) capsule Take 1 capsule by mouth daily. 30 capsule 11     lurasidone (LATUDA) 60 MG TABS tablet Take by mouth daily with food       order for DME AIRSENSE 10  10-15CM/H20  PILLOWS CHRISTIANSON FX FOR HER       PROGRAF (BRAND) 1 MG capsule Take 2 capsules (2 mg) by mouth 2 times daily. Labs needed. 120 capsule 0     sodium bicarbonate 650 MG tablet Take 650 mg by mouth 2 times daily       sulfamethoxazole-trimethoprim (BACTRIM/SEPTRA) 400-80 MG per tablet Take 1 tablet by mouth Every Mon, Wed, Fri Morning 40 tablet 0     lurasidone (LATUDA) 80 MG TABS tablet Take 80 mg by mouth daily at 2 pm       No current facility-administered medications for this visit.     There are no discontinued medications.    Physical Exam  Vital Signs: Ht 1.727 m (5' 8\")   Wt 127 kg (280 lb)   LMP 11/06/2015   BMI 42.57 kg/m      GENERAL APPEARANCE: alert and no distress  EYES: eyes grossly normal to inspection  HENT: normal cephalic/atraumatic  RESP: able to speak in full sentences, no audible wheezing or accessory muscle usage  EDEMA: denied significant LE edema bilaterally  MS: extremities normal - no gross deformities noted  SKIN: no visible facial rash  NEURO: mentation intact and speech normal  PSYCH: mentation appears normal and affect normal/bright    Data        Latest Ref Rng & Units 1/24/2024    10:15 AM 6/6/2023     3:49 PM 3/24/2023     5:10 PM   Renal   Na (external) 134 - 144 mmol/L 144     144  141       K (external) 3.5 - 5.2 mmol/L 4.3     3.8  4.1       Cl 96 - 106 mmol/L 106     106  106       Cl (external) 96 - 106 mmol/L 106     106  106       CO2 (external) 20 - " 29 mmol/L 23     24  23       BUN (external) 6 - 24 mg/dL 13     16  14       Cr (external) 0.57 - 1.00 mg/dL 0.79     0.63  0.55       Glucose (external) 70 - 99 mg/dL 158     148  119       Ca (external) 8.7 - 10.2 mg/dL 9.4     9.6  8.7           This result is from an external source.         Latest Ref Rng & Units 8/25/2017    11:07 AM 9/30/2013     4:57 PM 3/24/2013     5:56 AM   Bone Health   Phosphorus 2.5 - 4.5 mg/dL   2.5    Parathyroid Hormone Intact 12 - 72 pg/mL 62      Vit D Def 20 - 75 ug/L 23  20           Latest Ref Rng & Units 1/24/2024    10:15 AM 6/6/2023     3:49 PM 3/24/2023     5:10 PM   Heme   WBC (external) 3.4 - 10.8 x10E3/uL 6.1     7.4  7.9       Hgb (external) 11.1 - 15.9 g/dL 14.6     14.0  14.3       Plt (external) 150 - 450 x10E3/uL 273     280  295       ABSOLUTE NEUTROPHILS (EXTERNAL) 1.4 - 7.0 x10E3/uL 4.5     5.2     5.9       ABSOLUTE LYMPHOCYTES (EXTERNAL) 0.7 - 3.1 x10E3/uL 0.8     1.2     1.2       ABSOLUTE MONOCYTES (EXTERNAL) 0.1 - 0.9 x10E3/uL 0.6     0.7     0.7       ABSOLUTE EOSINOPHILS (EXTERNAL) 0.0 - 0.4 x10E3/uL 0.1     0.2     0.1       ABSOLUTE BASOPHILS (EXTERNAL) 0.0 - 0.2 x10E3/uL 0.0     0.0     0.0           This result is from an external source.         Latest Ref Rng & Units 1/24/2024    10:15 AM 6/6/2023     3:49 PM 3/24/2023     5:10 PM   Liver   AP (external) 44 - 121 IU/L 73     83     83       TBili (external) 0.0 - 1.2 mg/dL 0.4     0.2     0.3       ALT (external) 0 - 32 IU/L 15     11     10       AST (external) 0 - 40 IU/L 19     11     12       Tot Protein (external) 6.0 - 8.5 g/dL 6.9     6.8     6.9       Albumin (external) 3.8 - 4.9 g/dL 3.5     3.6     3.1           This result is from an external source.         Latest Ref Rng & Units 2/5/2024    11:34 AM 1/24/2024     8:15 AM 6/6/2023     3:49 PM   Pancreas   A1C (external) 4.8 - 5.6 % 7.3         Amylase (external) 31 - 110 U/L  46     55    Lipase (external) 31 - 110 U/L  46     55         This result is from an external source.         Latest Ref Rng & Units 1/24/2024     8:15 AM 9/30/2013     4:57 PM   Iron studies   Iron 35 - 180 ug/dL  36    Iron (external) 14 - 72 U/L 23        Iron Saturation Index 15 - 46 %  15    Ferritin 10 - 300 ng/mL  67        This result is from an external source.         Latest Ref Rng & Units 6/25/2018    11:15 AM 8/25/2017    11:13 AM 8/25/2017    11:10 AM   [facility] Txp Virology   CVM DNA Quant   Plasma, EDTA anticoagulant  C    CMV QUANT IU/ML CMVND^CMV DNA Not Detected [IU]/mL  CMV DNA Not Detected     LOG IU/ML OF CMVQNT <2.1 [Log_IU]/mL  Not Calculated     BK Spec  Plasma   Whole Blood  C   BK Res BKNEG^BK Virus DNA Not Detected copies/mL BK Virus DNA Not Detected   BK Virus DNA Not Detected    BK Log <2.7 Log copies/mL Not Calculated   Not Calculated       C Corrected result     Failed to redirect to the Timeline version of the REVFS SmartLink.  Recent Labs   Lab Test 11/18/16  0945 08/25/17  1109 06/25/18  1116 03/02/22  1414   DOSTAC 2,130 2,230 2,300  --    TACROL 5.6 5.3 6.6 5.7        Prescription drug management  44 minutes spent by me on the date of the encounter doing chart review, history and exam, documentation and further activities per the note      Again, thank you for allowing me to participate in the care of your patient.        Sincerely,        Lucille Reza MD

## 2024-10-02 NOTE — PROGRESS NOTES
Virtual Visit Details    Type of service:  Video Visit   Video Start Time: 9:41 AM  Video End Time:10:01 AM    Originating Location (pt. Location): Home    Distant Location (provider location):  Off-site  Platform used for Video Visit: [facility]    TRANSPLANT NEPHROLOGY CLINIC VISIT     Assessment & Plan   # DDKT (SPK): CKD Stage 2 - Stable   - Baseline Creatinine: ~ 0.6-0.8   - Proteinuria: Normal (<0.2 grams), not recently checked, will repeat   - DSA Hx: No DSA   - Last cPRA: 43%   - BK Viremia: No   - Kidney Tx Biopsy Hx: No biopsy history.    # Pancreas Tx (SPK):    - Pancreatic Exocrine Drainage: Enteric drained     - Blood glucose: Elevated blood glucose      On insulin: No   - HbA1c: Trend up      Latest HbA1c: 7.3%   - Pancreatic enzymes: Stable   - DSA Hx: No DSA   - Pancreas Tx Biopsy Hx: No biopsy history    # Immunosuppression: Tacrolimus immediate release (goal 5-8) and Azathioprine (dose 150 mg daily)   - Induction with Recent Transplant:  Not known due to time from transplant   - Continue with intensive monitoring of immunosuppression for efficacy and toxicity.   - Historical Changes in Immunosuppression: None   - Changes: Not at this time    # Infection Prevention:      - PJP: Sulfa/TMP (Bactrim)      - CMV IgG Ab High Risk Discordance (D+/R-): Unknown  CMV Serostatus: Unknown  - EBV IgG Ab High Risk Discordance (D+/R-): Unknown  EBV Serostatus: Unknown    # Hypertension: Controlled;  Goal BP: < 130/80   - Changes: Not at this time, not on medication    # Diabetes: Borderline control (HbA1c 7-9%) Last HbA1c: 7.3%    # Anemia in Chronic Renal Disease: Hgb: Stable      LAURENT: No   - Iron studies: Not checked recently    # Mineral Bone Disorder:    - Secondary renal hyperparathyroidism; PTH level: Normal (15-65 pg/ml)        On treatment: None  - Vitamin D; level: Not checked recently, but was normal last check        On supplement: No  - Calcium; level: Normal        On supplement: Yes    # Electrolytes:    - Potassium; level: Normal        On supplement: No  - Bicarbonate; level: Normal        On supplement: Yes  - Sodium; level: Normal    # Other Significant PMH:   - # Acute appendicitis:               -Hospitalized 5/13-5/16/22. Underwent appendectomy 5/15/22.      # H/o DVT with recurrent PE:              -Has permanent IVC filter in place              -on apixaban 5 mg BID     # Obesity:              -BMI 44.34kg/m2,has seen bariatric surgery. Per her GP, gastric surgery might not be a good idea    - will check with team if Manjaro is a good alternative, otherwise Ozempic can cause graft pancreatitis, would avoid at this time.   - referral placed to nutrition for better diet control              -encouraged to be active and asked her to let us know if she would be interested in weight loss management program again. Pt want to think about it.     # Depression: She is following with psychologist.     # Skin Cancer Risk:    - Discussed sun protection and recommend regular follow up with Dermatology.    # Transplant History:  Etiology of Kidney Failure: Diabetic nephropathy  Tx: DDKT (SPK)  Transplant: 7/12/2000 (Kidney / Pancreas)  Significant transplant-related complications: None    Transplant Office Phone Number: [phone]    Assessment and plan was discussed with the patient and she voiced her understanding and agreement.    Return visit: Return in about 1 year (around 10/2/2025).    Lucille Reza MD    The longitudinal plan of care for the diagnosis(es)/condition(s) as documented were addressed during this visit. Due to the added complexity in care, I will continue to support Delilah in the subsequent management and with ongoing continuity of care.      Chief Complaint   Ms. Fountain is a 59 year old here for kidney transplant, pancreas transplant, and immunosuppression management.     History of Present Illness    Ms. Fountain reports feeling stable overall.  Since last clinic  visit:   Hospitalizations: No   New Medical Issues: Yes, URI symptoms since her return from recent trip to visit her friends. Home COVID test was negative. May be started to feel better this morning.  Chest pain or shortness of breath: Yes, with increased physical activity during her travel felt more SOB, no significant chest pressure/pain  Lower extremity swelling: No  Weight change: Yes, she last 10-15 pounds but gained some because of carb diet, but trying to loose weight  Nausea and vomiting: No  Diarrhea: No  Heartburn symptoms: No  Fever, sweats or chills: No  Urinary complaints: No    Home BP:  130's systolic      Problem List   Patient Active Problem List   Diagnosis    Hernia of abdominal cavity, with obstruction    Hernia of abdominal wall    Knee pain, bilateral    Hip pain    HTN, kidney transplant related    Morbid obesity (H)    HERLINDA (obstructive sleep apnea)    Gastroesophageal reflux disease, unspecified whether esophagitis present    Anxiety    Recurrent major depressive disorder, remission status unspecified (H)    History of diabetes mellitus, type I    Prediabetes    Recurrent acute deep vein thrombosis (DVT) of both lower extremities (H)    Anemia in stage 2 chronic kidney disease    Kidney replaced by transplant    Pancreas replaced by transplant (H)    Immunosuppression (H)    Aftercare following organ transplant    Anticoagulated       Allergies   Allergies   Allergen Reactions    Wellbutrin [Bupropion] Other (See Comments)     Pt. Reports that it made her more weird       Medications   Current Outpatient Medications   Medication Sig Dispense Refill    apixaban ANTICOAGULANT (ELIQUIS) 5 MG tablet Take 1 tablet (5 mg) by mouth 2 times daily 60 tablet 11    azaTHIOprine (IMURAN) 50 MG tablet Take 3 tablets (150 mg) by mouth daily 90 tablet 11    calcium carbonate (OS-RENATO 500 MG [place]. [place]) 500 MG tablet Take 500 mg by mouth 2 times daily      cetirizine (ZYRTEC ALLERGY) 10 MG tablet Take 0.5  "tablets (5 mg) by mouth daily as needed for allergies      cholecalciferol (VITAMIN D3) 1000 units (25 mcg) capsule Take 1 capsule by mouth daily. 30 capsule 11    lurasidone (LATUDA) 60 MG TABS tablet Take by mouth daily with food      order for DME AIRSENSE 10  10-15CM/H20  PILLOWS CHRISTIANSON FX FOR HER      PROGRAF (BRAND) 1 MG capsule Take 2 capsules (2 mg) by mouth 2 times daily. Labs needed. 120 capsule 0    sodium bicarbonate 650 MG tablet Take 650 mg by mouth 2 times daily      sulfamethoxazole-trimethoprim (BACTRIM/SEPTRA) 400-80 MG per tablet Take 1 tablet by mouth Every Mon, Wed, Fri Morning 40 tablet 0    lurasidone (LATUDA) 80 MG TABS tablet Take 80 mg by mouth daily at 2 pm       No current facility-administered medications for this visit.     There are no discontinued medications.    Physical Exam   Vital Signs: Ht 1.727 m (5' 8\")   Wt 127 kg (280 lb)   LMP 11/06/2015   BMI 42.57 kg/m      GENERAL APPEARANCE: alert and no distress  EYES: eyes grossly normal to inspection  HENT: normal cephalic/atraumatic  RESP: able to speak in full sentences, no audible wheezing or accessory muscle usage  EDEMA: denied significant LE edema bilaterally  MS: extremities normal - no gross deformities noted  SKIN: no visible facial rash  NEURO: mentation intact and speech normal  PSYCH: mentation appears normal and affect normal/bright    Data         Latest Ref Rng & Units 1/24/2024    10:15 AM 6/6/2023     3:49 PM 3/24/2023     5:10 PM   Renal   Na (external) 134 - 144 mmol/L 144     144  141       K (external) 3.5 - 5.2 mmol/L 4.3     3.8  4.1       Cl 96 - 106 mmol/L 106     106  106       Cl (external) 96 - 106 mmol/L 106     106  106       CO2 (external) 20 - 29 mmol/L 23     24  23       BUN (external) 6 - 24 mg/dL 13     16  14       Cr (external) 0.57 - 1.00 mg/dL 0.79     0.63  0.55       Glucose (external) 70 - 99 mg/dL 158     148  119       Ca (external) 8.7 - 10.2 mg/dL 9.4     9.6  8.7           This result " is from an external source.         Latest Ref Rng & Units 8/25/2017    11:07 AM 9/30/2013     4:57 PM 3/24/2013     5:56 AM   Bone Health   Phosphorus 2.5 - 4.5 mg/dL   2.5    Parathyroid Hormone Intact 12 - 72 pg/mL 62      Vit D Def 20 - 75 ug/L 23  20           Latest Ref Rng & Units 1/24/2024    10:15 AM 6/6/2023     3:49 PM 3/24/2023     5:10 PM   Heme   WBC (external) 3.4 - 10.8 x10E3/uL 6.1     7.4  7.9       Hgb (external) 11.1 - 15.9 g/dL 14.6     14.0  14.3       Plt (external) 150 - 450 x10E3/uL 273     280  295       ABSOLUTE NEUTROPHILS (EXTERNAL) 1.4 - 7.0 x10E3/uL 4.5     5.2     5.9       ABSOLUTE LYMPHOCYTES (EXTERNAL) 0.7 - 3.1 x10E3/uL 0.8     1.2     1.2       ABSOLUTE MONOCYTES (EXTERNAL) 0.1 - 0.9 x10E3/uL 0.6     0.7     0.7       ABSOLUTE EOSINOPHILS (EXTERNAL) 0.0 - 0.4 x10E3/uL 0.1     0.2     0.1       ABSOLUTE BASOPHILS (EXTERNAL) 0.0 - 0.2 x10E3/uL 0.0     0.0     0.0           This result is from an external source.         Latest Ref Rng & Units 1/24/2024    10:15 AM 6/6/2023     3:49 PM 3/24/2023     5:10 PM   Liver   AP (external) 44 - 121 IU/L 73     83     83       TBili (external) 0.0 - 1.2 mg/dL 0.4     0.2     0.3       ALT (external) 0 - 32 IU/L 15     11     10       AST (external) 0 - 40 IU/L 19     11     12       Tot Protein (external) 6.0 - 8.5 g/dL 6.9     6.8     6.9       Albumin (external) 3.8 - 4.9 g/dL 3.5     3.6     3.1           This result is from an external source.         Latest Ref Rng & Units 2/5/2024    11:34 AM 1/24/2024     8:15 AM 6/6/2023     3:49 PM   Pancreas   A1C (external) 4.8 - 5.6 % 7.3         Amylase (external) 31 - 110 U/L  46     55    Lipase (external) 31 - 110 U/L  46     55        This result is from an external source.         Latest Ref Rng & Units 1/24/2024     8:15 AM 9/30/2013     4:57 PM   Iron studies   Iron 35 - 180 ug/dL  36    Iron (external) 14 - 72 U/L 23        Iron Saturation Index 15 - 46 %  15    Ferritin 10 - 300 ng/mL   67        This result is from an external source.         Latest Ref Rng & Units 6/25/2018    11:15 AM 8/25/2017    11:13 AM 8/25/2017    11:10 AM   [facility] Txp Virology   CVM DNA Quant   Plasma, EDTA anticoagulant  C    CMV QUANT IU/ML CMVND^CMV DNA Not Detected [IU]/mL  CMV DNA Not Detected     LOG IU/ML OF CMVQNT <2.1 [Log_IU]/mL  Not Calculated     BK Spec  Plasma   Whole Blood  C   BK Res BKNEG^BK Virus DNA Not Detected copies/mL BK Virus DNA Not Detected   BK Virus DNA Not Detected    BK Log <2.7 Log copies/mL Not Calculated   Not Calculated       C Corrected result     Failed to redirect to the Timeline version of the REVFS SmartLink.  Recent Labs   Lab Test 11/18/16  0945 08/25/17  1109 06/25/18  1116 03/02/22  1414   DOSTAC 2,130 2,230 2,300  --    TACROL 5.6 5.3 6.6 5.7        Prescription drug management  44 minutes spent by me on the date of the encounter doing chart review, history and exam, documentation and further activities per the note

## 2024-10-02 NOTE — NURSING NOTE
Current patient location: [address] [address] [address]  [facility] [address]    Is the patient currently in the state of [place]? YES    Visit mode:VIDEO    If the visit is dropped, the patient can be reconnected by: VIDEO VISIT: Text to cell phone:   Telephone Information:   Mobile [phone]       Will anyone else be joining the visit? NO  (If patient encounters technical issues they should call [phone]    Are changes needed to the allergy or medication list? No    Are refills needed on medications prescribed by this physician? NO    Rooming Documentation:  Patient will complete questionnaire(s) in [facility]    Reason for visit: Video Visit (Recheck)    Maria Luisa MOTT

## 2024-10-15 DIAGNOSIS — Z94.0 KIDNEY TRANSPLANTED: ICD-10-CM

## 2024-10-15 RX ORDER — TACROLIMUS 1 MG/1
2 CAPSULE, GELATIN COATED ORAL 2 TIMES DAILY
Qty: 120 CAPSULE | Refills: 0 | Status: SHIPPED | OUTPATIENT
Start: 2024-10-15

## 2024-11-19 DIAGNOSIS — Z94.0 KIDNEY TRANSPLANTED: ICD-10-CM

## 2024-11-19 RX ORDER — TACROLIMUS 1 MG/1
2 CAPSULE, GELATIN COATED ORAL 2 TIMES DAILY
Qty: 120 CAPSULE | Refills: 0 | Status: SHIPPED | OUTPATIENT
Start: 2024-11-19

## 2024-12-23 DIAGNOSIS — Z94.0 HTN, KIDNEY TRANSPLANT RELATED: ICD-10-CM

## 2024-12-23 DIAGNOSIS — I15.1 HTN, KIDNEY TRANSPLANT RELATED: ICD-10-CM

## 2024-12-23 DIAGNOSIS — Z94.0 KIDNEY TRANSPLANTED: ICD-10-CM

## 2024-12-23 RX ORDER — TACROLIMUS 1 MG/1
2 CAPSULE, GELATIN COATED ORAL 2 TIMES DAILY
Qty: 120 CAPSULE | Refills: 0 | Status: SHIPPED | OUTPATIENT
Start: 2024-12-23

## 2024-12-31 ENCOUNTER — TRANSFERRED RECORDS (OUTPATIENT)
Dept: HEALTH INFORMATION MANAGEMENT | Facility: CLINIC | Age: 59
End: 2024-12-31

## 2025-02-26 DIAGNOSIS — Z94.0 KIDNEY TRANSPLANTED: ICD-10-CM

## 2025-02-26 RX ORDER — TACROLIMUS 1 MG/1
2 CAPSULE, GELATIN COATED ORAL 2 TIMES DAILY
Qty: 120 CAPSULE | Refills: 3 | Status: SHIPPED | OUTPATIENT
Start: 2025-02-26

## 2025-02-26 NOTE — LETTER
Delilah Fountain  [address]  [facility] [address]              February 26, 2025        Hamlet Mazariegos,    This letter is regarding your transplant lab work. The most recent laboratory results we have on file for you are from January 24, 2024. Your labs should be completed monthly.   For the best outcome for your transplant and in order for us to refill your prescriptions, we encourage you to have current labs.  To make an appointment for lab please contact your clinic.      Thank you,    The Transplant Staff at [facility]

## 2025-02-26 NOTE — TELEPHONE ENCOUNTER
Tacrolimus level overdue. Lab requisition on file with [facility], last sent 12/23/24.    Prograf refilled.      Letter sent to patient.    Alem Diaz, RN, BSN, CCTN  Solid Organ Transplant, Post Kidney and Pancreas  Transplant Care Coordinator  [phone]

## 2025-03-28 DIAGNOSIS — Z94.0 KIDNEY TRANSPLANTED: ICD-10-CM

## 2025-03-28 DIAGNOSIS — Z94.83 PANCREAS REPLACED BY TRANSPLANT (H): ICD-10-CM

## 2025-03-31 RX ORDER — AZATHIOPRINE 50 MG/1
150 TABLET ORAL DAILY
Qty: 90 TABLET | Refills: 3 | Status: SHIPPED | OUTPATIENT
Start: 2025-03-31

## 2025-05-28 ENCOUNTER — TELEPHONE (OUTPATIENT)
Dept: MULTI SPECIALTY CLINIC | Facility: CLINIC | Age: 60
End: 2025-05-28
Payer: COMMERCIAL

## 2025-05-28 DIAGNOSIS — Z94.83 PANCREAS REPLACED BY TRANSPLANT (H): ICD-10-CM

## 2025-05-28 DIAGNOSIS — Z79.60 LONG-TERM USE OF IMMUNOSUPPRESSANT MEDICATION: Primary | ICD-10-CM

## 2025-05-28 DIAGNOSIS — Z94.0 KIDNEY TRANSPLANTED: ICD-10-CM

## 2025-05-28 RX ORDER — TACROLIMUS 1 MG/1
2 CAPSULE ORAL 2 TIMES DAILY
Qty: 120 CAPSULE | Refills: 3 | Status: SHIPPED | OUTPATIENT
Start: 2025-05-28

## 2025-05-28 NOTE — TELEPHONE ENCOUNTER
PRIOR AUTHORIZATION DENIED    Medication: TACROLIMUS (PROGRAF BRAND) 1 MG PO CAPS  Insurance Company: [employer] - Phone [phone] Fax [phone]  Denial Date: 5/28/2025  Denial Reason(s):   Denied due to why can't patient use generic over brand name    Appeal Information:   Can restart a new PA with new information showing why patient cannot use generic over brand    Patient Notified: jan (nurse ) spoke with patient and we are switching to generic at this time

## 2025-05-28 NOTE — LETTER
PHYSICIAN ORDERS    DATE & TIME ISSUED: May 28, 2025 9:17 AM  PATIENT NAME: Delilah Fountain   : 1965     [facility] MR# [if applicable]: [id number]     DIAGNOSIS:  Pancreas Transplanted; Kidney Transplanted;   Long Term Use of Medication  ICD-10 CODE: Z94.83;  Z94.0; Z79.899    Please check the following labs within 1-2 weeks:  - BMP  - CBC w/platelets  - Tacrolimus drug level (12 hour trough timing)  - Amylase  - Lipase    Any questions please call: [phone] option 5    Please fax each result same day as resulted/available [phone].  Critical lab results page [phone]      .  Lucille Reza MD

## 2025-05-28 NOTE — TELEPHONE ENCOUNTER
PA Initiation    Medication: TACROLIMUS (PROGRAF BRAND) 1 MG PO CAPS  Insurance Company: [employer] - Phone [phone] Fax [phone]  Pharmacy Filling the Rx: [facility] - [place], [place] - [facility] [address]  Filling Pharmacy Phone: [phone]  Filling Pharmacy Fax: [phone]  Start Date: 5/28/2025

## 2025-05-28 NOTE — TELEPHONE ENCOUNTER
RNCC spoke with Delilah after receiving message from [facility] that she had brand name Prograf ordered and inquiring if medical necessity. Waiting on Prior authorization. She had an insurance coverage change from [employer] to Medicaid.     Delilah states that the physicians just had wanted her on the brand name. Explained that in the years since she was transplanted ~25 years ago, the generic medication had come a long way and better trusted now. She is agreeable to trailing generic tacrolimus and understood that a drug level was recommended in next 1-2 weeks. She was asked if she had missed any doses and she states she has not missed any. Informed that  [facility]ty Pharmacy would be reaching out to set up delivery.     Preferred lab facility confirmed:     [facility]  Phone:[phone]  Fax:[phone]    Delilah states she will have to check with her insurance coverage if [facility] and [facility] are covered under her new plan.     Alem Diaz RN, BSN, CCTN  Solid Organ Transplant, Post Kidney and Pancreas  Transplant Care Coordinator  [phone]

## 2025-05-28 NOTE — TELEPHONE ENCOUNTER
[facility] Prior Authorization Team   Phone: [phone]  Fax: [phone]    PA Needed    Medication: Prograf PA Needed  QTY/DS:120 per 30 days  NEW INS:[facility]  Insurance Company:    Pharmacy Filling the Rx: [facility] - [facility] [address]  PA :    Date of last fill:3/28/2025

## 2025-06-14 ENCOUNTER — HEALTH MAINTENANCE LETTER (OUTPATIENT)
Age: 60
End: 2025-06-14

## 2025-08-13 ENCOUNTER — VIRTUAL VISIT (OUTPATIENT)
Dept: HEMATOLOGY | Facility: CLINIC | Age: 60
End: 2025-08-13
Attending: INTERNAL MEDICINE
Payer: COMMERCIAL

## 2025-08-13 DIAGNOSIS — Z86.711 HISTORY OF PULMONARY EMBOLISM: ICD-10-CM

## (undated) DEVICE — SU VICRYL 0 TIE 54" J608H

## (undated) DEVICE — ENDO TROCAR FIRST ENTRY KII FIOS ADV FIX 05X100MM CFF03

## (undated) DEVICE — LINEN TOWEL PACK X6 WHITE 5487

## (undated) DEVICE — COVER CAMERA IN-LIGHT DISP LT-C02

## (undated) DEVICE — ESU LIGASURE LAPAROSCOPIC BLUNT TIP SEALER 5MMX37CM LF1837

## (undated) DEVICE — NDL INSUFFLATION 13GA 150MM C2202

## (undated) DEVICE — ESU PENCIL W/COATED BLADE E2450H

## (undated) DEVICE — GLOVE PROTEXIS BLUE W/NEU-THERA 8.0  2D73EB80

## (undated) DEVICE — ENDO TROCAR FIRST ENTRY KII FIOS Z-THRD 05X150MM CTF01

## (undated) DEVICE — ENDO TROCAR FIRST ENTRY KII FIOS Z-THRD 12X150MM CTF71

## (undated) DEVICE — ESU ENDO SCISSORS 5MM CVD 5DCS

## (undated) DEVICE — DEVICE SUTURE PASSER 14GA WECK EFX EFXSP2

## (undated) DEVICE — ENDO TROCAR BLUNT TIP KII BALLOON 12X130MM C0R50

## (undated) DEVICE — SUCTION MANIFOLD NEPTUNE 2 SYS 4 PORT 0702-020-000

## (undated) DEVICE — SU MONOCRYL 4-0 PS-2 27" UND Y426H

## (undated) DEVICE — ESU GROUND PAD ADULT W/CORD E7507

## (undated) DEVICE — SU VICRYL 0 UR-6 27" J603H

## (undated) DEVICE — STPL POWERED ECHELON 45MM PSEE45A

## (undated) DEVICE — Device

## (undated) DEVICE — ENDO POUCH UNIV RETRIEVAL SYSTEM INZII 10MM CD001

## (undated) DEVICE — SOL ADH LIQUID BENZOIN SWAB 0.6ML C1544

## (undated) DEVICE — TUBING SMOKE EVAC PNEUMOCLEAR HIGH FLOW 0620050250

## (undated) DEVICE — GLOVE PROTEXIS W/NEU-THERA 7.5  2D73TE75

## (undated) DEVICE — PREP CHLORAPREP 26ML TINTED HI-LITE ORANGE 930815

## (undated) DEVICE — SUCTION IRR STRYKERFLOW II W/TIP 250-070-520

## (undated) DEVICE — LINEN TOWEL PACK X5 5464

## (undated) DEVICE — ANTIFOG SOLUTION W/FOAM PAD 31142527

## (undated) DEVICE — STPL RELOAD REG TISSUE ECHELON 45 X 3.6MM BLUE GST45B

## (undated) DEVICE — ANTIFOG SOLUTION W/FOAM PAD CF-1001

## (undated) DEVICE — LIGHT HANDLE X1 31140133

## (undated) RX ORDER — METOPROLOL TARTRATE 1 MG/ML
INJECTION, SOLUTION INTRAVENOUS
Status: DISPENSED
Start: 2020-02-29

## (undated) RX ORDER — HYDROMORPHONE HCL IN WATER/PF 6 MG/30 ML
PATIENT CONTROLLED ANALGESIA SYRINGE INTRAVENOUS
Status: DISPENSED
Start: 2022-05-15

## (undated) RX ORDER — BUPIVACAINE HYDROCHLORIDE 2.5 MG/ML
INJECTION, SOLUTION EPIDURAL; INFILTRATION; INTRACAUDAL
Status: DISPENSED
Start: 2022-05-15

## (undated) RX ORDER — FENTANYL CITRATE 50 UG/ML
INJECTION, SOLUTION INTRAMUSCULAR; INTRAVENOUS
Status: DISPENSED
Start: 2022-05-15

## (undated) RX ORDER — DOBUTAMINE HYDROCHLORIDE 200 MG/100ML
INJECTION INTRAVENOUS
Status: DISPENSED
Start: 2020-02-29

## (undated) RX ORDER — ATROPINE SULFATE/0.9 %SOD CHLR 1.2 MG/3ML
SYRINGE (ML) INTRAVENOUS
Status: DISPENSED
Start: 2020-02-29

## (undated) RX ORDER — SODIUM CHLORIDE 9 MG/ML
INJECTION, SOLUTION INTRAVENOUS
Status: DISPENSED
Start: 2022-05-15